# Patient Record
Sex: FEMALE | Race: ASIAN | NOT HISPANIC OR LATINO | Employment: UNEMPLOYED | URBAN - METROPOLITAN AREA
[De-identification: names, ages, dates, MRNs, and addresses within clinical notes are randomized per-mention and may not be internally consistent; named-entity substitution may affect disease eponyms.]

---

## 2022-01-01 ENCOUNTER — APPOINTMENT (OUTPATIENT)
Dept: RADIOLOGY | Facility: MEDICAL CENTER | Age: 57
DRG: 064 | End: 2022-01-01
Attending: HOSPITALIST
Payer: COMMERCIAL

## 2022-01-01 ENCOUNTER — HOME CARE VISIT (OUTPATIENT)
Dept: HOSPICE | Facility: HOSPICE | Age: 57
End: 2022-01-01
Payer: MEDICAID

## 2022-01-01 ENCOUNTER — APPOINTMENT (OUTPATIENT)
Dept: RADIOLOGY | Facility: MEDICAL CENTER | Age: 57
DRG: 064 | End: 2022-01-01
Attending: STUDENT IN AN ORGANIZED HEALTH CARE EDUCATION/TRAINING PROGRAM
Payer: COMMERCIAL

## 2022-01-01 ENCOUNTER — APPOINTMENT (OUTPATIENT)
Dept: RADIOLOGY | Facility: MEDICAL CENTER | Age: 57
DRG: 064 | End: 2022-01-01
Attending: EMERGENCY MEDICINE
Payer: COMMERCIAL

## 2022-01-01 ENCOUNTER — PHARMACY VISIT (OUTPATIENT)
Dept: PHARMACY | Facility: MEDICAL CENTER | Age: 57
End: 2022-01-01
Payer: MEDICARE

## 2022-01-01 ENCOUNTER — ANESTHESIA (OUTPATIENT)
Dept: SURGERY | Facility: MEDICAL CENTER | Age: 57
DRG: 064 | End: 2022-01-01
Payer: COMMERCIAL

## 2022-01-01 ENCOUNTER — APPOINTMENT (OUTPATIENT)
Dept: CARDIOLOGY | Facility: MEDICAL CENTER | Age: 57
DRG: 064 | End: 2022-01-01
Attending: STUDENT IN AN ORGANIZED HEALTH CARE EDUCATION/TRAINING PROGRAM
Payer: COMMERCIAL

## 2022-01-01 ENCOUNTER — HOSPICE ADMISSION (OUTPATIENT)
Dept: HOSPICE | Facility: HOSPICE | Age: 57
End: 2022-01-01
Payer: MEDICAID

## 2022-01-01 ENCOUNTER — ANESTHESIA EVENT (OUTPATIENT)
Dept: SURGERY | Facility: MEDICAL CENTER | Age: 57
DRG: 064 | End: 2022-01-01
Payer: COMMERCIAL

## 2022-01-01 ENCOUNTER — HOSPITAL ENCOUNTER (INPATIENT)
Facility: MEDICAL CENTER | Age: 57
LOS: 37 days | DRG: 064 | End: 2022-12-09
Attending: EMERGENCY MEDICINE | Admitting: HOSPITALIST
Payer: COMMERCIAL

## 2022-01-01 VITALS — RESPIRATION RATE: 14 BRPM | HEART RATE: 92 BPM

## 2022-01-01 VITALS — HEART RATE: 110 BPM | DIASTOLIC BLOOD PRESSURE: 60 MMHG | RESPIRATION RATE: 20 BRPM | SYSTOLIC BLOOD PRESSURE: 106 MMHG

## 2022-01-01 VITALS — RESPIRATION RATE: 14 BRPM

## 2022-01-01 VITALS — DIASTOLIC BLOOD PRESSURE: 90 MMHG | SYSTOLIC BLOOD PRESSURE: 146 MMHG | HEART RATE: 108 BPM | RESPIRATION RATE: 16 BRPM

## 2022-01-01 VITALS
HEART RATE: 96 BPM | RESPIRATION RATE: 16 BRPM | HEIGHT: 58 IN | OXYGEN SATURATION: 94 % | WEIGHT: 122.58 LBS | SYSTOLIC BLOOD PRESSURE: 124 MMHG | BODY MASS INDEX: 25.73 KG/M2 | DIASTOLIC BLOOD PRESSURE: 78 MMHG | TEMPERATURE: 97.6 F

## 2022-01-01 VITALS — RESPIRATION RATE: 18 BRPM

## 2022-01-01 DIAGNOSIS — F33.1 MODERATE EPISODE OF RECURRENT MAJOR DEPRESSIVE DISORDER (HCC): ICD-10-CM

## 2022-01-01 DIAGNOSIS — R29.898 WEAKNESS OF RIGHT UPPER EXTREMITY: ICD-10-CM

## 2022-01-01 DIAGNOSIS — R06.00 DYSPNEA, UNSPECIFIED TYPE: ICD-10-CM

## 2022-01-01 DIAGNOSIS — R52 PAIN ASSOCIATED WITH TERMINAL ILLNESS: ICD-10-CM

## 2022-01-01 DIAGNOSIS — I63.9 CEREBROVASCULAR ACCIDENT (CVA), UNSPECIFIED MECHANISM (HCC): ICD-10-CM

## 2022-01-01 DIAGNOSIS — J96.01 ACUTE HYPOXEMIC RESPIRATORY FAILURE (HCC): ICD-10-CM

## 2022-01-01 DIAGNOSIS — I63.232 ACUTE ISCHEMIC LEFT ICA STROKE (HCC): ICD-10-CM

## 2022-01-01 DIAGNOSIS — Z51.5 COMFORT MEASURES ONLY STATUS: ICD-10-CM

## 2022-01-01 DIAGNOSIS — R62.7 FAILURE TO THRIVE IN ADULT: ICD-10-CM

## 2022-01-01 DIAGNOSIS — F41.9 ANXIETY: ICD-10-CM

## 2022-01-01 DIAGNOSIS — J96.01 ACUTE RESPIRATORY FAILURE WITH HYPOXEMIA (HCC): ICD-10-CM

## 2022-01-01 DIAGNOSIS — I63.9 CEREBROVASCULAR ACCIDENT (CVA), UNSPECIFIED MECHANISM (HCC): Primary | ICD-10-CM

## 2022-01-01 DIAGNOSIS — I63.9 ACUTE CVA (CEREBROVASCULAR ACCIDENT) (HCC): ICD-10-CM

## 2022-01-01 DIAGNOSIS — R13.12 OROPHARYNGEAL DYSPHAGIA: ICD-10-CM

## 2022-01-01 LAB
ABO GROUP BLD: NORMAL
ALBUMIN SERPL BCP-MCNC: 2.7 G/DL (ref 3.2–4.9)
ALBUMIN SERPL BCP-MCNC: 2.8 G/DL (ref 3.2–4.9)
ALBUMIN SERPL BCP-MCNC: 2.9 G/DL (ref 3.2–4.9)
ALBUMIN SERPL BCP-MCNC: 3.2 G/DL (ref 3.2–4.9)
ALBUMIN SERPL BCP-MCNC: 3.4 G/DL (ref 3.2–4.9)
ALBUMIN SERPL BCP-MCNC: 4 G/DL (ref 3.2–4.9)
ALBUMIN/GLOB SERPL: 0.8 G/DL
ALBUMIN/GLOB SERPL: 0.8 G/DL
ALBUMIN/GLOB SERPL: 0.9 G/DL
ALBUMIN/GLOB SERPL: 1 G/DL
ALBUMIN/GLOB SERPL: 1 G/DL
ALBUMIN/GLOB SERPL: 1.4 G/DL
ALP SERPL-CCNC: 108 U/L (ref 30–99)
ALP SERPL-CCNC: 109 U/L (ref 30–99)
ALP SERPL-CCNC: 120 U/L (ref 30–99)
ALP SERPL-CCNC: 121 U/L (ref 30–99)
ALP SERPL-CCNC: 82 U/L (ref 30–99)
ALP SERPL-CCNC: 88 U/L (ref 30–99)
ALT SERPL-CCNC: 18 U/L (ref 2–50)
ALT SERPL-CCNC: 20 U/L (ref 2–50)
ALT SERPL-CCNC: 34 U/L (ref 2–50)
ALT SERPL-CCNC: 35 U/L (ref 2–50)
ALT SERPL-CCNC: 41 U/L (ref 2–50)
ALT SERPL-CCNC: 41 U/L (ref 2–50)
ANION GAP SERPL CALC-SCNC: 10 MMOL/L (ref 7–16)
ANION GAP SERPL CALC-SCNC: 13 MMOL/L (ref 7–16)
ANION GAP SERPL CALC-SCNC: 14 MMOL/L (ref 7–16)
ANION GAP SERPL CALC-SCNC: 15 MMOL/L (ref 7–16)
ANION GAP SERPL CALC-SCNC: 6 MMOL/L (ref 7–16)
ANION GAP SERPL CALC-SCNC: 7 MMOL/L (ref 7–16)
ANION GAP SERPL CALC-SCNC: 8 MMOL/L (ref 7–16)
ANION GAP SERPL CALC-SCNC: 9 MMOL/L (ref 7–16)
APPEARANCE UR: ABNORMAL
APTT PPP: 23.4 SEC (ref 24.7–36)
AST SERPL-CCNC: 24 U/L (ref 12–45)
AST SERPL-CCNC: 24 U/L (ref 12–45)
AST SERPL-CCNC: 32 U/L (ref 12–45)
AST SERPL-CCNC: 35 U/L (ref 12–45)
AST SERPL-CCNC: 38 U/L (ref 12–45)
AST SERPL-CCNC: 38 U/L (ref 12–45)
BACTERIA #/AREA URNS HPF: ABNORMAL /HPF
BACTERIA UR CULT: ABNORMAL
BACTERIA UR CULT: ABNORMAL
BASOPHILS # BLD AUTO: 0.6 % (ref 0–1.8)
BASOPHILS # BLD AUTO: 0.7 % (ref 0–1.8)
BASOPHILS # BLD AUTO: 0.8 % (ref 0–1.8)
BASOPHILS # BLD AUTO: 1 % (ref 0–1.8)
BASOPHILS # BLD: 0.07 K/UL (ref 0–0.12)
BASOPHILS # BLD: 0.09 K/UL (ref 0–0.12)
BASOPHILS # BLD: 0.11 K/UL (ref 0–0.12)
BILIRUB SERPL-MCNC: 0.2 MG/DL (ref 0.1–1.5)
BILIRUB SERPL-MCNC: 0.2 MG/DL (ref 0.1–1.5)
BILIRUB SERPL-MCNC: 0.3 MG/DL (ref 0.1–1.5)
BILIRUB SERPL-MCNC: 0.3 MG/DL (ref 0.1–1.5)
BILIRUB SERPL-MCNC: 0.5 MG/DL (ref 0.1–1.5)
BILIRUB SERPL-MCNC: 0.5 MG/DL (ref 0.1–1.5)
BILIRUB UR QL STRIP.AUTO: NEGATIVE
BLD GP AB SCN SERPL QL: NORMAL
BUN SERPL-MCNC: 15 MG/DL (ref 8–22)
BUN SERPL-MCNC: 16 MG/DL (ref 8–22)
BUN SERPL-MCNC: 17 MG/DL (ref 8–22)
BUN SERPL-MCNC: 23 MG/DL (ref 8–22)
BUN SERPL-MCNC: 33 MG/DL (ref 8–22)
BUN SERPL-MCNC: 36 MG/DL (ref 8–22)
BUN SERPL-MCNC: 40 MG/DL (ref 8–22)
BUN SERPL-MCNC: 40 MG/DL (ref 8–22)
BUN SERPL-MCNC: 44 MG/DL (ref 8–22)
BUN SERPL-MCNC: 47 MG/DL (ref 8–22)
CALCIUM SERPL-MCNC: 10.1 MG/DL (ref 8.5–10.5)
CALCIUM SERPL-MCNC: 10.2 MG/DL (ref 8.5–10.5)
CALCIUM SERPL-MCNC: 9.1 MG/DL (ref 8.5–10.5)
CALCIUM SERPL-MCNC: 9.1 MG/DL (ref 8.5–10.5)
CALCIUM SERPL-MCNC: 9.2 MG/DL (ref 8.5–10.5)
CALCIUM SERPL-MCNC: 9.2 MG/DL (ref 8.5–10.5)
CALCIUM SERPL-MCNC: 9.3 MG/DL (ref 8.5–10.5)
CALCIUM SERPL-MCNC: 9.4 MG/DL (ref 8.5–10.5)
CALCIUM SERPL-MCNC: 9.4 MG/DL (ref 8.5–10.5)
CALCIUM SERPL-MCNC: 9.6 MG/DL (ref 8.5–10.5)
CHLORIDE SERPL-SCNC: 100 MMOL/L (ref 96–112)
CHLORIDE SERPL-SCNC: 102 MMOL/L (ref 96–112)
CHLORIDE SERPL-SCNC: 103 MMOL/L (ref 96–112)
CHLORIDE SERPL-SCNC: 105 MMOL/L (ref 96–112)
CHLORIDE SERPL-SCNC: 95 MMOL/L (ref 96–112)
CHLORIDE SERPL-SCNC: 97 MMOL/L (ref 96–112)
CHLORIDE SERPL-SCNC: 97 MMOL/L (ref 96–112)
CHLORIDE SERPL-SCNC: 99 MMOL/L (ref 96–112)
CHOLEST SERPL-MCNC: 243 MG/DL (ref 100–199)
CO2 SERPL-SCNC: 22 MMOL/L (ref 20–33)
CO2 SERPL-SCNC: 22 MMOL/L (ref 20–33)
CO2 SERPL-SCNC: 23 MMOL/L (ref 20–33)
CO2 SERPL-SCNC: 25 MMOL/L (ref 20–33)
CO2 SERPL-SCNC: 28 MMOL/L (ref 20–33)
CO2 SERPL-SCNC: 31 MMOL/L (ref 20–33)
CO2 SERPL-SCNC: 35 MMOL/L (ref 20–33)
CO2 SERPL-SCNC: 35 MMOL/L (ref 20–33)
COLOR UR: YELLOW
CREAT SERPL-MCNC: 0.3 MG/DL (ref 0.5–1.4)
CREAT SERPL-MCNC: 0.31 MG/DL (ref 0.5–1.4)
CREAT SERPL-MCNC: 0.35 MG/DL (ref 0.5–1.4)
CREAT SERPL-MCNC: 0.49 MG/DL (ref 0.5–1.4)
CREAT SERPL-MCNC: 0.52 MG/DL (ref 0.5–1.4)
CREAT SERPL-MCNC: 0.53 MG/DL (ref 0.5–1.4)
CREAT SERPL-MCNC: 0.53 MG/DL (ref 0.5–1.4)
CREAT SERPL-MCNC: 0.81 MG/DL (ref 0.5–1.4)
CREAT SERPL-MCNC: 0.85 MG/DL (ref 0.5–1.4)
CREAT SERPL-MCNC: 0.85 MG/DL (ref 0.5–1.4)
CRP SERPL HS-MCNC: 0.6 MG/DL (ref 0–0.75)
CRP SERPL HS-MCNC: 0.64 MG/DL (ref 0–0.75)
CRP SERPL HS-MCNC: 1.25 MG/DL (ref 0–0.75)
CRP SERPL HS-MCNC: 3.53 MG/DL (ref 0–0.75)
CRP SERPL HS-MCNC: <0.3 MG/DL (ref 0–0.75)
EKG IMPRESSION: NORMAL
EOSINOPHIL # BLD AUTO: 0.02 K/UL (ref 0–0.51)
EOSINOPHIL # BLD AUTO: 0.17 K/UL (ref 0–0.51)
EOSINOPHIL # BLD AUTO: 0.18 K/UL (ref 0–0.51)
EOSINOPHIL # BLD AUTO: 0.22 K/UL (ref 0–0.51)
EOSINOPHIL # BLD AUTO: 0.22 K/UL (ref 0–0.51)
EOSINOPHIL # BLD AUTO: 0.23 K/UL (ref 0–0.51)
EOSINOPHIL NFR BLD: 0.2 % (ref 0–6.9)
EOSINOPHIL NFR BLD: 1.4 % (ref 0–6.9)
EOSINOPHIL NFR BLD: 1.5 % (ref 0–6.9)
EOSINOPHIL NFR BLD: 1.5 % (ref 0–6.9)
EOSINOPHIL NFR BLD: 1.8 % (ref 0–6.9)
EOSINOPHIL NFR BLD: 1.8 % (ref 0–6.9)
EPI CELLS #/AREA URNS HPF: ABNORMAL /HPF
ERYTHROCYTE [DISTWIDTH] IN BLOOD BY AUTOMATED COUNT: 37 FL (ref 35.9–50)
ERYTHROCYTE [DISTWIDTH] IN BLOOD BY AUTOMATED COUNT: 37.1 FL (ref 35.9–50)
ERYTHROCYTE [DISTWIDTH] IN BLOOD BY AUTOMATED COUNT: 37.1 FL (ref 35.9–50)
ERYTHROCYTE [DISTWIDTH] IN BLOOD BY AUTOMATED COUNT: 37.5 FL (ref 35.9–50)
ERYTHROCYTE [DISTWIDTH] IN BLOOD BY AUTOMATED COUNT: 38 FL (ref 35.9–50)
ERYTHROCYTE [DISTWIDTH] IN BLOOD BY AUTOMATED COUNT: 39.5 FL (ref 35.9–50)
ERYTHROCYTE [DISTWIDTH] IN BLOOD BY AUTOMATED COUNT: 40 FL (ref 35.9–50)
ERYTHROCYTE [DISTWIDTH] IN BLOOD BY AUTOMATED COUNT: 40.6 FL (ref 35.9–50)
ERYTHROCYTE [DISTWIDTH] IN BLOOD BY AUTOMATED COUNT: 41.1 FL (ref 35.9–50)
ERYTHROCYTE [DISTWIDTH] IN BLOOD BY AUTOMATED COUNT: 41.3 FL (ref 35.9–50)
EST. AVERAGE GLUCOSE BLD GHB EST-MCNC: 280 MG/DL
GAMMA INTERFERON BACKGROUND BLD IA-ACNC: 0.02 IU/ML
GFR SERPLBLD CREATININE-BSD FMLA CKD-EPI: 106 ML/MIN/1.73 M 2
GFR SERPLBLD CREATININE-BSD FMLA CKD-EPI: 107 ML/MIN/1.73 M 2
GFR SERPLBLD CREATININE-BSD FMLA CKD-EPI: 107 ML/MIN/1.73 M 2
GFR SERPLBLD CREATININE-BSD FMLA CKD-EPI: 109 ML/MIN/1.73 M 2
GFR SERPLBLD CREATININE-BSD FMLA CKD-EPI: 118 ML/MIN/1.73 M 2
GFR SERPLBLD CREATININE-BSD FMLA CKD-EPI: 121 ML/MIN/1.73 M 2
GFR SERPLBLD CREATININE-BSD FMLA CKD-EPI: 122 ML/MIN/1.73 M 2
GFR SERPLBLD CREATININE-BSD FMLA CKD-EPI: 79 ML/MIN/1.73 M 2
GFR SERPLBLD CREATININE-BSD FMLA CKD-EPI: 79 ML/MIN/1.73 M 2
GFR SERPLBLD CREATININE-BSD FMLA CKD-EPI: 84 ML/MIN/1.73 M 2
GLOBULIN SER CALC-MCNC: 2.8 G/DL (ref 1.9–3.5)
GLOBULIN SER CALC-MCNC: 3.2 G/DL (ref 1.9–3.5)
GLOBULIN SER CALC-MCNC: 3.2 G/DL (ref 1.9–3.5)
GLOBULIN SER CALC-MCNC: 3.3 G/DL (ref 1.9–3.5)
GLOBULIN SER CALC-MCNC: 3.3 G/DL (ref 1.9–3.5)
GLOBULIN SER CALC-MCNC: 3.5 G/DL (ref 1.9–3.5)
GLUCOSE BLD STRIP.AUTO-MCNC: 102 MG/DL (ref 65–99)
GLUCOSE BLD STRIP.AUTO-MCNC: 105 MG/DL (ref 65–99)
GLUCOSE BLD STRIP.AUTO-MCNC: 107 MG/DL (ref 65–99)
GLUCOSE BLD STRIP.AUTO-MCNC: 110 MG/DL (ref 65–99)
GLUCOSE BLD STRIP.AUTO-MCNC: 111 MG/DL (ref 65–99)
GLUCOSE BLD STRIP.AUTO-MCNC: 112 MG/DL (ref 65–99)
GLUCOSE BLD STRIP.AUTO-MCNC: 114 MG/DL (ref 65–99)
GLUCOSE BLD STRIP.AUTO-MCNC: 114 MG/DL (ref 65–99)
GLUCOSE BLD STRIP.AUTO-MCNC: 117 MG/DL (ref 65–99)
GLUCOSE BLD STRIP.AUTO-MCNC: 121 MG/DL (ref 65–99)
GLUCOSE BLD STRIP.AUTO-MCNC: 125 MG/DL (ref 65–99)
GLUCOSE BLD STRIP.AUTO-MCNC: 127 MG/DL (ref 65–99)
GLUCOSE BLD STRIP.AUTO-MCNC: 129 MG/DL (ref 65–99)
GLUCOSE BLD STRIP.AUTO-MCNC: 131 MG/DL (ref 65–99)
GLUCOSE BLD STRIP.AUTO-MCNC: 131 MG/DL (ref 65–99)
GLUCOSE BLD STRIP.AUTO-MCNC: 132 MG/DL (ref 65–99)
GLUCOSE BLD STRIP.AUTO-MCNC: 133 MG/DL (ref 65–99)
GLUCOSE BLD STRIP.AUTO-MCNC: 134 MG/DL (ref 65–99)
GLUCOSE BLD STRIP.AUTO-MCNC: 135 MG/DL (ref 65–99)
GLUCOSE BLD STRIP.AUTO-MCNC: 137 MG/DL (ref 65–99)
GLUCOSE BLD STRIP.AUTO-MCNC: 139 MG/DL (ref 65–99)
GLUCOSE BLD STRIP.AUTO-MCNC: 140 MG/DL (ref 65–99)
GLUCOSE BLD STRIP.AUTO-MCNC: 141 MG/DL (ref 65–99)
GLUCOSE BLD STRIP.AUTO-MCNC: 142 MG/DL (ref 65–99)
GLUCOSE BLD STRIP.AUTO-MCNC: 143 MG/DL (ref 65–99)
GLUCOSE BLD STRIP.AUTO-MCNC: 144 MG/DL (ref 65–99)
GLUCOSE BLD STRIP.AUTO-MCNC: 144 MG/DL (ref 65–99)
GLUCOSE BLD STRIP.AUTO-MCNC: 147 MG/DL (ref 65–99)
GLUCOSE BLD STRIP.AUTO-MCNC: 148 MG/DL (ref 65–99)
GLUCOSE BLD STRIP.AUTO-MCNC: 150 MG/DL (ref 65–99)
GLUCOSE BLD STRIP.AUTO-MCNC: 151 MG/DL (ref 65–99)
GLUCOSE BLD STRIP.AUTO-MCNC: 152 MG/DL (ref 65–99)
GLUCOSE BLD STRIP.AUTO-MCNC: 153 MG/DL (ref 65–99)
GLUCOSE BLD STRIP.AUTO-MCNC: 154 MG/DL (ref 65–99)
GLUCOSE BLD STRIP.AUTO-MCNC: 156 MG/DL (ref 65–99)
GLUCOSE BLD STRIP.AUTO-MCNC: 158 MG/DL (ref 65–99)
GLUCOSE BLD STRIP.AUTO-MCNC: 159 MG/DL (ref 65–99)
GLUCOSE BLD STRIP.AUTO-MCNC: 160 MG/DL (ref 65–99)
GLUCOSE BLD STRIP.AUTO-MCNC: 164 MG/DL (ref 65–99)
GLUCOSE BLD STRIP.AUTO-MCNC: 167 MG/DL (ref 65–99)
GLUCOSE BLD STRIP.AUTO-MCNC: 169 MG/DL (ref 65–99)
GLUCOSE BLD STRIP.AUTO-MCNC: 170 MG/DL (ref 65–99)
GLUCOSE BLD STRIP.AUTO-MCNC: 172 MG/DL (ref 65–99)
GLUCOSE BLD STRIP.AUTO-MCNC: 173 MG/DL (ref 65–99)
GLUCOSE BLD STRIP.AUTO-MCNC: 173 MG/DL (ref 65–99)
GLUCOSE BLD STRIP.AUTO-MCNC: 174 MG/DL (ref 65–99)
GLUCOSE BLD STRIP.AUTO-MCNC: 175 MG/DL (ref 65–99)
GLUCOSE BLD STRIP.AUTO-MCNC: 175 MG/DL (ref 65–99)
GLUCOSE BLD STRIP.AUTO-MCNC: 176 MG/DL (ref 65–99)
GLUCOSE BLD STRIP.AUTO-MCNC: 177 MG/DL (ref 65–99)
GLUCOSE BLD STRIP.AUTO-MCNC: 178 MG/DL (ref 65–99)
GLUCOSE BLD STRIP.AUTO-MCNC: 179 MG/DL (ref 65–99)
GLUCOSE BLD STRIP.AUTO-MCNC: 180 MG/DL (ref 65–99)
GLUCOSE BLD STRIP.AUTO-MCNC: 183 MG/DL (ref 65–99)
GLUCOSE BLD STRIP.AUTO-MCNC: 185 MG/DL (ref 65–99)
GLUCOSE BLD STRIP.AUTO-MCNC: 186 MG/DL (ref 65–99)
GLUCOSE BLD STRIP.AUTO-MCNC: 187 MG/DL (ref 65–99)
GLUCOSE BLD STRIP.AUTO-MCNC: 188 MG/DL (ref 65–99)
GLUCOSE BLD STRIP.AUTO-MCNC: 191 MG/DL (ref 65–99)
GLUCOSE BLD STRIP.AUTO-MCNC: 193 MG/DL (ref 65–99)
GLUCOSE BLD STRIP.AUTO-MCNC: 199 MG/DL (ref 65–99)
GLUCOSE BLD STRIP.AUTO-MCNC: 200 MG/DL (ref 65–99)
GLUCOSE BLD STRIP.AUTO-MCNC: 201 MG/DL (ref 65–99)
GLUCOSE BLD STRIP.AUTO-MCNC: 202 MG/DL (ref 65–99)
GLUCOSE BLD STRIP.AUTO-MCNC: 203 MG/DL (ref 65–99)
GLUCOSE BLD STRIP.AUTO-MCNC: 203 MG/DL (ref 65–99)
GLUCOSE BLD STRIP.AUTO-MCNC: 204 MG/DL (ref 65–99)
GLUCOSE BLD STRIP.AUTO-MCNC: 206 MG/DL (ref 65–99)
GLUCOSE BLD STRIP.AUTO-MCNC: 207 MG/DL (ref 65–99)
GLUCOSE BLD STRIP.AUTO-MCNC: 208 MG/DL (ref 65–99)
GLUCOSE BLD STRIP.AUTO-MCNC: 211 MG/DL (ref 65–99)
GLUCOSE BLD STRIP.AUTO-MCNC: 212 MG/DL (ref 65–99)
GLUCOSE BLD STRIP.AUTO-MCNC: 212 MG/DL (ref 65–99)
GLUCOSE BLD STRIP.AUTO-MCNC: 213 MG/DL (ref 65–99)
GLUCOSE BLD STRIP.AUTO-MCNC: 213 MG/DL (ref 65–99)
GLUCOSE BLD STRIP.AUTO-MCNC: 215 MG/DL (ref 65–99)
GLUCOSE BLD STRIP.AUTO-MCNC: 218 MG/DL (ref 65–99)
GLUCOSE BLD STRIP.AUTO-MCNC: 220 MG/DL (ref 65–99)
GLUCOSE BLD STRIP.AUTO-MCNC: 221 MG/DL (ref 65–99)
GLUCOSE BLD STRIP.AUTO-MCNC: 222 MG/DL (ref 65–99)
GLUCOSE BLD STRIP.AUTO-MCNC: 224 MG/DL (ref 65–99)
GLUCOSE BLD STRIP.AUTO-MCNC: 224 MG/DL (ref 65–99)
GLUCOSE BLD STRIP.AUTO-MCNC: 225 MG/DL (ref 65–99)
GLUCOSE BLD STRIP.AUTO-MCNC: 226 MG/DL (ref 65–99)
GLUCOSE BLD STRIP.AUTO-MCNC: 227 MG/DL (ref 65–99)
GLUCOSE BLD STRIP.AUTO-MCNC: 228 MG/DL (ref 65–99)
GLUCOSE BLD STRIP.AUTO-MCNC: 230 MG/DL (ref 65–99)
GLUCOSE BLD STRIP.AUTO-MCNC: 232 MG/DL (ref 65–99)
GLUCOSE BLD STRIP.AUTO-MCNC: 233 MG/DL (ref 65–99)
GLUCOSE BLD STRIP.AUTO-MCNC: 234 MG/DL (ref 65–99)
GLUCOSE BLD STRIP.AUTO-MCNC: 237 MG/DL (ref 65–99)
GLUCOSE BLD STRIP.AUTO-MCNC: 242 MG/DL (ref 65–99)
GLUCOSE BLD STRIP.AUTO-MCNC: 242 MG/DL (ref 65–99)
GLUCOSE BLD STRIP.AUTO-MCNC: 244 MG/DL (ref 65–99)
GLUCOSE BLD STRIP.AUTO-MCNC: 245 MG/DL (ref 65–99)
GLUCOSE BLD STRIP.AUTO-MCNC: 251 MG/DL (ref 65–99)
GLUCOSE BLD STRIP.AUTO-MCNC: 255 MG/DL (ref 65–99)
GLUCOSE BLD STRIP.AUTO-MCNC: 257 MG/DL (ref 65–99)
GLUCOSE BLD STRIP.AUTO-MCNC: 266 MG/DL (ref 65–99)
GLUCOSE BLD STRIP.AUTO-MCNC: 266 MG/DL (ref 65–99)
GLUCOSE BLD STRIP.AUTO-MCNC: 268 MG/DL (ref 65–99)
GLUCOSE BLD STRIP.AUTO-MCNC: 273 MG/DL (ref 65–99)
GLUCOSE BLD STRIP.AUTO-MCNC: 274 MG/DL (ref 65–99)
GLUCOSE BLD STRIP.AUTO-MCNC: 277 MG/DL (ref 65–99)
GLUCOSE BLD STRIP.AUTO-MCNC: 280 MG/DL (ref 65–99)
GLUCOSE BLD STRIP.AUTO-MCNC: 282 MG/DL (ref 65–99)
GLUCOSE BLD STRIP.AUTO-MCNC: 287 MG/DL (ref 65–99)
GLUCOSE BLD STRIP.AUTO-MCNC: 65 MG/DL (ref 65–99)
GLUCOSE BLD STRIP.AUTO-MCNC: 82 MG/DL (ref 65–99)
GLUCOSE BLD STRIP.AUTO-MCNC: 84 MG/DL (ref 65–99)
GLUCOSE BLD STRIP.AUTO-MCNC: 92 MG/DL (ref 65–99)
GLUCOSE BLD STRIP.AUTO-MCNC: 95 MG/DL (ref 65–99)
GLUCOSE SERPL-MCNC: 135 MG/DL (ref 65–99)
GLUCOSE SERPL-MCNC: 142 MG/DL (ref 65–99)
GLUCOSE SERPL-MCNC: 147 MG/DL (ref 65–99)
GLUCOSE SERPL-MCNC: 181 MG/DL (ref 65–99)
GLUCOSE SERPL-MCNC: 191 MG/DL (ref 65–99)
GLUCOSE SERPL-MCNC: 193 MG/DL (ref 65–99)
GLUCOSE SERPL-MCNC: 204 MG/DL (ref 65–99)
GLUCOSE SERPL-MCNC: 215 MG/DL (ref 65–99)
GLUCOSE SERPL-MCNC: 221 MG/DL (ref 65–99)
GLUCOSE SERPL-MCNC: 224 MG/DL (ref 65–99)
GLUCOSE UR STRIP.AUTO-MCNC: 100 MG/DL
HBA1C MFR BLD: 11.4 % (ref 4–5.6)
HCT VFR BLD AUTO: 36.4 % (ref 37–47)
HCT VFR BLD AUTO: 38 % (ref 37–47)
HCT VFR BLD AUTO: 38.4 % (ref 37–47)
HCT VFR BLD AUTO: 38.6 % (ref 37–47)
HCT VFR BLD AUTO: 38.6 % (ref 37–47)
HCT VFR BLD AUTO: 39.1 % (ref 37–47)
HCT VFR BLD AUTO: 39.4 % (ref 37–47)
HCT VFR BLD AUTO: 39.9 % (ref 37–47)
HCT VFR BLD AUTO: 40.6 % (ref 37–47)
HCT VFR BLD AUTO: 42.8 % (ref 37–47)
HDLC SERPL-MCNC: 56 MG/DL
HGB BLD-MCNC: 11.9 G/DL (ref 12–16)
HGB BLD-MCNC: 12.2 G/DL (ref 12–16)
HGB BLD-MCNC: 12.6 G/DL (ref 12–16)
HGB BLD-MCNC: 12.9 G/DL (ref 12–16)
HGB BLD-MCNC: 12.9 G/DL (ref 12–16)
HGB BLD-MCNC: 13 G/DL (ref 12–16)
HGB BLD-MCNC: 13.4 G/DL (ref 12–16)
HYALINE CASTS #/AREA URNS LPF: ABNORMAL /LPF
IMM GRANULOCYTES # BLD AUTO: 0.01 K/UL (ref 0–0.11)
IMM GRANULOCYTES # BLD AUTO: 0.04 K/UL (ref 0–0.11)
IMM GRANULOCYTES # BLD AUTO: 0.06 K/UL (ref 0–0.11)
IMM GRANULOCYTES # BLD AUTO: 0.07 K/UL (ref 0–0.11)
IMM GRANULOCYTES # BLD AUTO: 0.08 K/UL (ref 0–0.11)
IMM GRANULOCYTES # BLD AUTO: 0.1 K/UL (ref 0–0.11)
IMM GRANULOCYTES NFR BLD AUTO: 0.1 % (ref 0–0.9)
IMM GRANULOCYTES NFR BLD AUTO: 0.4 % (ref 0–0.9)
IMM GRANULOCYTES NFR BLD AUTO: 0.5 % (ref 0–0.9)
IMM GRANULOCYTES NFR BLD AUTO: 0.6 % (ref 0–0.9)
IMM GRANULOCYTES NFR BLD AUTO: 0.7 % (ref 0–0.9)
IMM GRANULOCYTES NFR BLD AUTO: 0.7 % (ref 0–0.9)
INR PPP: 1.04 (ref 0.87–1.13)
KETONES UR STRIP.AUTO-MCNC: NEGATIVE MG/DL
LACTATE SERPL-SCNC: 0.9 MMOL/L (ref 0.5–2)
LDLC SERPL CALC-MCNC: 162 MG/DL
LEUKOCYTE ESTERASE UR QL STRIP.AUTO: ABNORMAL
LV EJECT FRACT  99904: 60
LV EJECT FRACT MOD 2C 99903: 42.92
LV EJECT FRACT MOD 4C 99902: 46.84
LV EJECT FRACT MOD BP 99901: 46.06
LYMPHOCYTES # BLD AUTO: 1.4 K/UL (ref 1–4.8)
LYMPHOCYTES # BLD AUTO: 1.6 K/UL (ref 1–4.8)
LYMPHOCYTES # BLD AUTO: 1.65 K/UL (ref 1–4.8)
LYMPHOCYTES # BLD AUTO: 1.73 K/UL (ref 1–4.8)
LYMPHOCYTES # BLD AUTO: 1.74 K/UL (ref 1–4.8)
LYMPHOCYTES # BLD AUTO: 1.95 K/UL (ref 1–4.8)
LYMPHOCYTES NFR BLD: 11 % (ref 22–41)
LYMPHOCYTES NFR BLD: 12.6 % (ref 22–41)
LYMPHOCYTES NFR BLD: 13.1 % (ref 22–41)
LYMPHOCYTES NFR BLD: 13.6 % (ref 22–41)
LYMPHOCYTES NFR BLD: 15.2 % (ref 22–41)
LYMPHOCYTES NFR BLD: 19.7 % (ref 22–41)
M TB IFN-G BLD-IMP: NEGATIVE
M TB IFN-G CD4+ BCKGRND COR BLD-ACNC: 0 IU/ML
MAGNESIUM SERPL-MCNC: 1.7 MG/DL (ref 1.5–2.5)
MAGNESIUM SERPL-MCNC: 2 MG/DL (ref 1.5–2.5)
MAGNESIUM SERPL-MCNC: 2 MG/DL (ref 1.5–2.5)
MAGNESIUM SERPL-MCNC: 2.2 MG/DL (ref 1.5–2.5)
MCH RBC QN AUTO: 28.8 PG (ref 27–33)
MCH RBC QN AUTO: 28.9 PG (ref 27–33)
MCH RBC QN AUTO: 29 PG (ref 27–33)
MCH RBC QN AUTO: 29 PG (ref 27–33)
MCH RBC QN AUTO: 29.1 PG (ref 27–33)
MCH RBC QN AUTO: 29.1 PG (ref 27–33)
MCH RBC QN AUTO: 29.5 PG (ref 27–33)
MCHC RBC AUTO-ENTMCNC: 31.3 G/DL (ref 33.6–35)
MCHC RBC AUTO-ENTMCNC: 31.8 G/DL (ref 33.6–35)
MCHC RBC AUTO-ENTMCNC: 32 G/DL (ref 33.6–35)
MCHC RBC AUTO-ENTMCNC: 32 G/DL (ref 33.6–35)
MCHC RBC AUTO-ENTMCNC: 32.3 G/DL (ref 33.6–35)
MCHC RBC AUTO-ENTMCNC: 32.6 G/DL (ref 33.6–35)
MCHC RBC AUTO-ENTMCNC: 32.6 G/DL (ref 33.6–35)
MCHC RBC AUTO-ENTMCNC: 32.7 G/DL (ref 33.6–35)
MCHC RBC AUTO-ENTMCNC: 33 G/DL (ref 33.6–35)
MCHC RBC AUTO-ENTMCNC: 33.2 G/DL (ref 33.6–35)
MCV RBC AUTO: 87.6 FL (ref 81.4–97.8)
MCV RBC AUTO: 87.7 FL (ref 81.4–97.8)
MCV RBC AUTO: 88.5 FL (ref 81.4–97.8)
MCV RBC AUTO: 88.6 FL (ref 81.4–97.8)
MCV RBC AUTO: 89.1 FL (ref 81.4–97.8)
MCV RBC AUTO: 90.2 FL (ref 81.4–97.8)
MCV RBC AUTO: 91 FL (ref 81.4–97.8)
MCV RBC AUTO: 91 FL (ref 81.4–97.8)
MCV RBC AUTO: 91.1 FL (ref 81.4–97.8)
MCV RBC AUTO: 91.8 FL (ref 81.4–97.8)
MICRO URNS: ABNORMAL
MITOGEN IGNF BCKGRD COR BLD-ACNC: 2.92 IU/ML
MONOCYTES # BLD AUTO: 0.42 K/UL (ref 0–0.85)
MONOCYTES # BLD AUTO: 0.6 K/UL (ref 0–0.85)
MONOCYTES # BLD AUTO: 0.66 K/UL (ref 0–0.85)
MONOCYTES # BLD AUTO: 0.7 K/UL (ref 0–0.85)
MONOCYTES # BLD AUTO: 0.72 K/UL (ref 0–0.85)
MONOCYTES # BLD AUTO: 0.93 K/UL (ref 0–0.85)
MONOCYTES NFR BLD AUTO: 4.7 % (ref 0–13.4)
MONOCYTES NFR BLD AUTO: 4.8 % (ref 0–13.4)
MONOCYTES NFR BLD AUTO: 5.4 % (ref 0–13.4)
MONOCYTES NFR BLD AUTO: 5.4 % (ref 0–13.4)
MONOCYTES NFR BLD AUTO: 6.3 % (ref 0–13.4)
MONOCYTES NFR BLD AUTO: 6.5 % (ref 0–13.4)
NEUTROPHILS # BLD AUTO: 10.31 K/UL (ref 2–7.15)
NEUTROPHILS # BLD AUTO: 10.38 K/UL (ref 2–7.15)
NEUTROPHILS # BLD AUTO: 10.98 K/UL (ref 2–7.15)
NEUTROPHILS # BLD AUTO: 6.54 K/UL (ref 2–7.15)
NEUTROPHILS # BLD AUTO: 8.63 K/UL (ref 2–7.15)
NEUTROPHILS # BLD AUTO: 9.57 K/UL (ref 2–7.15)
NEUTROPHILS NFR BLD: 74.2 % (ref 44–72)
NEUTROPHILS NFR BLD: 76 % (ref 44–72)
NEUTROPHILS NFR BLD: 76.9 % (ref 44–72)
NEUTROPHILS NFR BLD: 78.3 % (ref 44–72)
NEUTROPHILS NFR BLD: 79.4 % (ref 44–72)
NEUTROPHILS NFR BLD: 81.2 % (ref 44–72)
NITRITE UR QL STRIP.AUTO: NEGATIVE
NRBC # BLD AUTO: 0 K/UL
NRBC BLD-RTO: 0 /100 WBC
NT-PROBNP SERPL IA-MCNC: 675 PG/ML (ref 0–125)
PH UR STRIP.AUTO: 7.5 [PH] (ref 5–8)
PHOSPHATE SERPL-MCNC: 2.9 MG/DL (ref 2.5–4.5)
PHOSPHATE SERPL-MCNC: 3.1 MG/DL (ref 2.5–4.5)
PHOSPHATE SERPL-MCNC: 3.8 MG/DL (ref 2.5–4.5)
PLATELET # BLD AUTO: 324 K/UL (ref 164–446)
PLATELET # BLD AUTO: 326 K/UL (ref 164–446)
PLATELET # BLD AUTO: 331 K/UL (ref 164–446)
PLATELET # BLD AUTO: 340 K/UL (ref 164–446)
PLATELET # BLD AUTO: 367 K/UL (ref 164–446)
PLATELET # BLD AUTO: 387 K/UL (ref 164–446)
PLATELET # BLD AUTO: 388 K/UL (ref 164–446)
PLATELET # BLD AUTO: 393 K/UL (ref 164–446)
PLATELET # BLD AUTO: 413 K/UL (ref 164–446)
PLATELET # BLD AUTO: 461 K/UL (ref 164–446)
PMV BLD AUTO: 8.9 FL (ref 9–12.9)
PMV BLD AUTO: 8.9 FL (ref 9–12.9)
PMV BLD AUTO: 9 FL (ref 9–12.9)
PMV BLD AUTO: 9 FL (ref 9–12.9)
PMV BLD AUTO: 9.2 FL (ref 9–12.9)
PMV BLD AUTO: 9.4 FL (ref 9–12.9)
PMV BLD AUTO: 9.5 FL (ref 9–12.9)
PMV BLD AUTO: 9.8 FL (ref 9–12.9)
POTASSIUM SERPL-SCNC: 3.9 MMOL/L (ref 3.6–5.5)
POTASSIUM SERPL-SCNC: 4 MMOL/L (ref 3.6–5.5)
POTASSIUM SERPL-SCNC: 4.1 MMOL/L (ref 3.6–5.5)
POTASSIUM SERPL-SCNC: 4.3 MMOL/L (ref 3.6–5.5)
POTASSIUM SERPL-SCNC: 4.3 MMOL/L (ref 3.6–5.5)
POTASSIUM SERPL-SCNC: 4.5 MMOL/L (ref 3.6–5.5)
POTASSIUM SERPL-SCNC: 4.8 MMOL/L (ref 3.6–5.5)
POTASSIUM SERPL-SCNC: 4.8 MMOL/L (ref 3.6–5.5)
PREALB SERPL-MCNC: 16.5 MG/DL (ref 18–38)
PREALB SERPL-MCNC: 17.5 MG/DL (ref 18–38)
PREALB SERPL-MCNC: 18.2 MG/DL (ref 18–38)
PREALB SERPL-MCNC: 18.9 MG/DL (ref 18–38)
PREALB SERPL-MCNC: 21.1 MG/DL (ref 18–38)
PROCALCITONIN SERPL-MCNC: 0.1 NG/ML
PROCALCITONIN SERPL-MCNC: 0.1 NG/ML
PROT SERPL-MCNC: 6.1 G/DL (ref 6–8.2)
PROT SERPL-MCNC: 6.1 G/DL (ref 6–8.2)
PROT SERPL-MCNC: 6.2 G/DL (ref 6–8.2)
PROT SERPL-MCNC: 6.4 G/DL (ref 6–8.2)
PROT SERPL-MCNC: 6.7 G/DL (ref 6–8.2)
PROT SERPL-MCNC: 6.8 G/DL (ref 6–8.2)
PROT UR QL STRIP: 100 MG/DL
PROTHROMBIN TIME: 13.5 SEC (ref 12–14.6)
QFT TB2 - NIL TBQ2: 0 IU/ML
RBC # BLD AUTO: 4.11 M/UL (ref 4.2–5.4)
RBC # BLD AUTO: 4.22 M/UL (ref 4.2–5.4)
RBC # BLD AUTO: 4.33 M/UL (ref 4.2–5.4)
RBC # BLD AUTO: 4.33 M/UL (ref 4.2–5.4)
RBC # BLD AUTO: 4.34 M/UL (ref 4.2–5.4)
RBC # BLD AUTO: 4.36 M/UL (ref 4.2–5.4)
RBC # BLD AUTO: 4.38 M/UL (ref 4.2–5.4)
RBC # BLD AUTO: 4.46 M/UL (ref 4.2–5.4)
RBC # BLD AUTO: 4.5 M/UL (ref 4.2–5.4)
RBC # BLD AUTO: 4.66 M/UL (ref 4.2–5.4)
RBC # URNS HPF: ABNORMAL /HPF
RBC UR QL AUTO: ABNORMAL
RH BLD: NORMAL
SARS-COV+SARS-COV-2 AG RESP QL IA.RAPID: NOTDETECTED
SIGNIFICANT IND 70042: ABNORMAL
SITE SITE: ABNORMAL
SODIUM SERPL-SCNC: 134 MMOL/L (ref 135–145)
SODIUM SERPL-SCNC: 134 MMOL/L (ref 135–145)
SODIUM SERPL-SCNC: 135 MMOL/L (ref 135–145)
SODIUM SERPL-SCNC: 135 MMOL/L (ref 135–145)
SODIUM SERPL-SCNC: 137 MMOL/L (ref 135–145)
SODIUM SERPL-SCNC: 138 MMOL/L (ref 135–145)
SODIUM SERPL-SCNC: 139 MMOL/L (ref 135–145)
SODIUM SERPL-SCNC: 142 MMOL/L (ref 135–145)
SODIUM SERPL-SCNC: 142 MMOL/L (ref 135–145)
SODIUM SERPL-SCNC: 145 MMOL/L (ref 135–145)
SOURCE SOURCE: ABNORMAL
SP GR UR STRIP.AUTO: 1.01
SPECIMEN SOURCE: NORMAL
TRIGL SERPL-MCNC: 127 MG/DL (ref 0–149)
TROPONIN T SERPL-MCNC: 14 NG/L (ref 6–19)
TSH SERPL DL<=0.005 MIU/L-ACNC: 0.48 UIU/ML (ref 0.38–5.33)
UROBILINOGEN UR STRIP.AUTO-MCNC: 1 MG/DL
WBC # BLD AUTO: 11.4 K/UL (ref 4.8–10.8)
WBC # BLD AUTO: 12 K/UL (ref 4.8–10.8)
WBC # BLD AUTO: 12 K/UL (ref 4.8–10.8)
WBC # BLD AUTO: 12.2 K/UL (ref 4.8–10.8)
WBC # BLD AUTO: 12.7 K/UL (ref 4.8–10.8)
WBC # BLD AUTO: 13.1 K/UL (ref 4.8–10.8)
WBC # BLD AUTO: 14.3 K/UL (ref 4.8–10.8)
WBC # BLD AUTO: 8.8 K/UL (ref 4.8–10.8)
WBC # BLD AUTO: 9.4 K/UL (ref 4.8–10.8)
WBC # BLD AUTO: 9.9 K/UL (ref 4.8–10.8)
WBC #/AREA URNS HPF: ABNORMAL /HPF

## 2022-01-01 PROCEDURE — A9270 NON-COVERED ITEM OR SERVICE: HCPCS | Performed by: STUDENT IN AN ORGANIZED HEALTH CARE EDUCATION/TRAINING PROGRAM

## 2022-01-01 PROCEDURE — 82962 GLUCOSE BLOOD TEST: CPT | Mod: 91

## 2022-01-01 PROCEDURE — 86140 C-REACTIVE PROTEIN: CPT

## 2022-01-01 PROCEDURE — A9270 NON-COVERED ITEM OR SERVICE: HCPCS | Performed by: HOSPITALIST

## 2022-01-01 PROCEDURE — 99254 IP/OBS CNSLTJ NEW/EST MOD 60: CPT | Performed by: PHYSICAL MEDICINE & REHABILITATION

## 2022-01-01 PROCEDURE — 700111 HCHG RX REV CODE 636 W/ 250 OVERRIDE (IP): Performed by: STUDENT IN AN ORGANIZED HEALTH CARE EDUCATION/TRAINING PROGRAM

## 2022-01-01 PROCEDURE — 83735 ASSAY OF MAGNESIUM: CPT

## 2022-01-01 PROCEDURE — 700102 HCHG RX REV CODE 250 W/ 637 OVERRIDE(OP): Performed by: STUDENT IN AN ORGANIZED HEALTH CARE EDUCATION/TRAINING PROGRAM

## 2022-01-01 PROCEDURE — A9270 NON-COVERED ITEM OR SERVICE: HCPCS

## 2022-01-01 PROCEDURE — 700102 HCHG RX REV CODE 250 W/ 637 OVERRIDE(OP): Performed by: HOSPITALIST

## 2022-01-01 PROCEDURE — 80053 COMPREHEN METABOLIC PANEL: CPT

## 2022-01-01 PROCEDURE — G0299 HHS/HOSPICE OF RN EA 15 MIN: HCPCS

## 2022-01-01 PROCEDURE — 85025 COMPLETE CBC W/AUTO DIFF WBC: CPT

## 2022-01-01 PROCEDURE — 99232 SBSQ HOSP IP/OBS MODERATE 35: CPT | Performed by: HOSPITALIST

## 2022-01-01 PROCEDURE — 770004 HCHG ROOM/CARE - ONCOLOGY PRIVATE *

## 2022-01-01 PROCEDURE — 160208 HCHG ENDO MINUTES - EA ADDL 1 MIN LEVEL 4: Performed by: INTERNAL MEDICINE

## 2022-01-01 PROCEDURE — 770001 HCHG ROOM/CARE - MED/SURG/GYN PRIV*

## 2022-01-01 PROCEDURE — 82962 GLUCOSE BLOOD TEST: CPT

## 2022-01-01 PROCEDURE — 97535 SELF CARE MNGMENT TRAINING: CPT | Mod: CO

## 2022-01-01 PROCEDURE — 99231 SBSQ HOSP IP/OBS SF/LOW 25: CPT | Performed by: STUDENT IN AN ORGANIZED HEALTH CARE EDUCATION/TRAINING PROGRAM

## 2022-01-01 PROCEDURE — 770020 HCHG ROOM/CARE - TELE (206)

## 2022-01-01 PROCEDURE — 36415 COLL VENOUS BLD VENIPUNCTURE: CPT

## 2022-01-01 PROCEDURE — 93005 ELECTROCARDIOGRAM TRACING: CPT | Performed by: EMERGENCY MEDICINE

## 2022-01-01 PROCEDURE — 84134 ASSAY OF PREALBUMIN: CPT

## 2022-01-01 PROCEDURE — 99232 SBSQ HOSP IP/OBS MODERATE 35: CPT | Performed by: STUDENT IN AN ORGANIZED HEALTH CARE EDUCATION/TRAINING PROGRAM

## 2022-01-01 PROCEDURE — 99222 1ST HOSP IP/OBS MODERATE 55: CPT | Performed by: INTERNAL MEDICINE

## 2022-01-01 PROCEDURE — 83605 ASSAY OF LACTIC ACID: CPT

## 2022-01-01 PROCEDURE — 51798 US URINE CAPACITY MEASURE: CPT

## 2022-01-01 PROCEDURE — S9126 HOSPICE CARE, IN THE HOME, P: HCPCS

## 2022-01-01 PROCEDURE — 92612 ENDOSCOPY SWALLOW (FEES) VID: CPT

## 2022-01-01 PROCEDURE — 99223 1ST HOSP IP/OBS HIGH 75: CPT | Mod: AI | Performed by: STUDENT IN AN ORGANIZED HEALTH CARE EDUCATION/TRAINING PROGRAM

## 2022-01-01 PROCEDURE — 700105 HCHG RX REV CODE 258: Performed by: HOSPITALIST

## 2022-01-01 PROCEDURE — 70450 CT HEAD/BRAIN W/O DYE: CPT

## 2022-01-01 PROCEDURE — 84145 PROCALCITONIN (PCT): CPT

## 2022-01-01 PROCEDURE — 97530 THERAPEUTIC ACTIVITIES: CPT | Mod: CQ

## 2022-01-01 PROCEDURE — 160035 HCHG PACU - 1ST 60 MINS PHASE I: Performed by: INTERNAL MEDICINE

## 2022-01-01 PROCEDURE — G0155 HHCP-SVS OF CSW,EA 15 MIN: HCPCS

## 2022-01-01 PROCEDURE — 99239 HOSP IP/OBS DSCHRG MGMT >30: CPT | Performed by: STUDENT IN AN ORGANIZED HEALTH CARE EDUCATION/TRAINING PROGRAM

## 2022-01-01 PROCEDURE — 71045 X-RAY EXAM CHEST 1 VIEW: CPT

## 2022-01-01 PROCEDURE — 92610 EVALUATE SWALLOWING FUNCTION: CPT

## 2022-01-01 PROCEDURE — G0156 HHCP-SVS OF AIDE,EA 15 MIN: HCPCS

## 2022-01-01 PROCEDURE — 84100 ASSAY OF PHOSPHORUS: CPT

## 2022-01-01 PROCEDURE — 0042T CT-CEREBRAL PERFUSION ANALYSIS: CPT

## 2022-01-01 PROCEDURE — 97116 GAIT TRAINING THERAPY: CPT

## 2022-01-01 PROCEDURE — 97162 PT EVAL MOD COMPLEX 30 MIN: CPT

## 2022-01-01 PROCEDURE — 700117 HCHG RX CONTRAST REV CODE 255: Performed by: STUDENT IN AN ORGANIZED HEALTH CARE EDUCATION/TRAINING PROGRAM

## 2022-01-01 PROCEDURE — 81001 URINALYSIS AUTO W/SCOPE: CPT

## 2022-01-01 PROCEDURE — 99233 SBSQ HOSP IP/OBS HIGH 50: CPT | Performed by: HOSPITALIST

## 2022-01-01 PROCEDURE — 93306 TTE W/DOPPLER COMPLETE: CPT | Mod: 26 | Performed by: INTERNAL MEDICINE

## 2022-01-01 PROCEDURE — 700117 HCHG RX CONTRAST REV CODE 255: Performed by: EMERGENCY MEDICINE

## 2022-01-01 PROCEDURE — 85027 COMPLETE CBC AUTOMATED: CPT

## 2022-01-01 PROCEDURE — 700105 HCHG RX REV CODE 258: Performed by: STUDENT IN AN ORGANIZED HEALTH CARE EDUCATION/TRAINING PROGRAM

## 2022-01-01 PROCEDURE — 93306 TTE W/DOPPLER COMPLETE: CPT

## 2022-01-01 PROCEDURE — 70496 CT ANGIOGRAPHY HEAD: CPT

## 2022-01-01 PROCEDURE — 70551 MRI BRAIN STEM W/O DYE: CPT

## 2022-01-01 PROCEDURE — 92526 ORAL FUNCTION THERAPY: CPT

## 2022-01-01 PROCEDURE — 83880 ASSAY OF NATRIURETIC PEPTIDE: CPT

## 2022-01-01 PROCEDURE — 86480 TB TEST CELL IMMUN MEASURE: CPT

## 2022-01-01 PROCEDURE — 92507 TX SP LANG VOICE COMM INDIV: CPT

## 2022-01-01 PROCEDURE — 71260 CT THORAX DX C+: CPT

## 2022-01-01 PROCEDURE — 95816 EEG AWAKE AND DROWSY: CPT | Mod: 26 | Performed by: STUDENT IN AN ORGANIZED HEALTH CARE EDUCATION/TRAINING PROGRAM

## 2022-01-01 PROCEDURE — 85730 THROMBOPLASTIN TIME PARTIAL: CPT

## 2022-01-01 PROCEDURE — 86900 BLOOD TYPING SEROLOGIC ABO: CPT

## 2022-01-01 PROCEDURE — 83036 HEMOGLOBIN GLYCOSYLATED A1C: CPT

## 2022-01-01 PROCEDURE — 87086 URINE CULTURE/COLONY COUNT: CPT

## 2022-01-01 PROCEDURE — 80048 BASIC METABOLIC PNL TOTAL CA: CPT

## 2022-01-01 PROCEDURE — 700111 HCHG RX REV CODE 636 W/ 250 OVERRIDE (IP): Performed by: ANESTHESIOLOGY

## 2022-01-01 PROCEDURE — 99255 IP/OBS CONSLTJ NEW/EST HI 80: CPT | Performed by: PSYCHIATRY & NEUROLOGY

## 2022-01-01 PROCEDURE — 700101 HCHG RX REV CODE 250: Performed by: STUDENT IN AN ORGANIZED HEALTH CARE EDUCATION/TRAINING PROGRAM

## 2022-01-01 PROCEDURE — 700105 HCHG RX REV CODE 258: Performed by: ANESTHESIOLOGY

## 2022-01-01 PROCEDURE — 99233 SBSQ HOSP IP/OBS HIGH 50: CPT | Performed by: PSYCHIATRY & NEUROLOGY

## 2022-01-01 PROCEDURE — 94760 N-INVAS EAR/PLS OXIMETRY 1: CPT

## 2022-01-01 PROCEDURE — 95816 EEG AWAKE AND DROWSY: CPT | Performed by: STUDENT IN AN ORGANIZED HEALTH CARE EDUCATION/TRAINING PROGRAM

## 2022-01-01 PROCEDURE — 94669 MECHANICAL CHEST WALL OSCILL: CPT

## 2022-01-01 PROCEDURE — 94640 AIRWAY INHALATION TREATMENT: CPT

## 2022-01-01 PROCEDURE — 160009 HCHG ANES TIME/MIN: Performed by: INTERNAL MEDICINE

## 2022-01-01 PROCEDURE — 84484 ASSAY OF TROPONIN QUANT: CPT

## 2022-01-01 PROCEDURE — 97165 OT EVAL LOW COMPLEX 30 MIN: CPT

## 2022-01-01 PROCEDURE — 160203 HCHG ENDO MINUTES - 1ST 30 MINS LEVEL 4: Performed by: INTERNAL MEDICINE

## 2022-01-01 PROCEDURE — 96372 THER/PROPH/DIAG INJ SC/IM: CPT

## 2022-01-01 PROCEDURE — 97530 THERAPEUTIC ACTIVITIES: CPT

## 2022-01-01 PROCEDURE — 99285 EMERGENCY DEPT VISIT HI MDM: CPT

## 2022-01-01 PROCEDURE — 99233 SBSQ HOSP IP/OBS HIGH 50: CPT | Performed by: STUDENT IN AN ORGANIZED HEALTH CARE EDUCATION/TRAINING PROGRAM

## 2022-01-01 PROCEDURE — 160048 HCHG OR STATISTICAL LEVEL 1-5: Performed by: INTERNAL MEDICINE

## 2022-01-01 PROCEDURE — 110372 HCHG SHELL REV 278: Performed by: INTERNAL MEDICINE

## 2022-01-01 PROCEDURE — 700102 HCHG RX REV CODE 250 W/ 637 OVERRIDE(OP)

## 2022-01-01 PROCEDURE — 85610 PROTHROMBIN TIME: CPT

## 2022-01-01 PROCEDURE — 87077 CULTURE AEROBIC IDENTIFY: CPT

## 2022-01-01 PROCEDURE — RXMED WILLOW AMBULATORY MEDICATION CHARGE: Performed by: REHABILITATION PRACTITIONER

## 2022-01-01 PROCEDURE — 80061 LIPID PANEL: CPT

## 2022-01-01 PROCEDURE — 96105 ASSESSMENT OF APHASIA: CPT

## 2022-01-01 PROCEDURE — 0DH63UZ INSERTION OF FEEDING DEVICE INTO STOMACH, PERCUTANEOUS APPROACH: ICD-10-PCS | Performed by: STUDENT IN AN ORGANIZED HEALTH CARE EDUCATION/TRAINING PROGRAM

## 2022-01-01 PROCEDURE — 700105 HCHG RX REV CODE 258

## 2022-01-01 PROCEDURE — 43246 EGD PLACE GASTROSTOMY TUBE: CPT | Performed by: INTERNAL MEDICINE

## 2022-01-01 PROCEDURE — 99232 SBSQ HOSP IP/OBS MODERATE 35: CPT | Performed by: NURSE PRACTITIONER

## 2022-01-01 PROCEDURE — 160002 HCHG RECOVERY MINUTES (STAT): Performed by: INTERNAL MEDICINE

## 2022-01-01 PROCEDURE — 00731 ANES UPR GI NDSC PX NOS: CPT | Performed by: ANESTHESIOLOGY

## 2022-01-01 PROCEDURE — 87186 SC STD MICRODIL/AGAR DIL: CPT

## 2022-01-01 PROCEDURE — 70498 CT ANGIOGRAPHY NECK: CPT

## 2022-01-01 PROCEDURE — 87426 SARSCOV CORONAVIRUS AG IA: CPT

## 2022-01-01 PROCEDURE — 4A00X4Z MEASUREMENT OF CENTRAL NERVOUS ELECTRICAL ACTIVITY, EXTERNAL APPROACH: ICD-10-PCS | Performed by: STUDENT IN AN ORGANIZED HEALTH CARE EDUCATION/TRAINING PROGRAM

## 2022-01-01 PROCEDURE — 86850 RBC ANTIBODY SCREEN: CPT

## 2022-01-01 PROCEDURE — 97112 NEUROMUSCULAR REEDUCATION: CPT | Mod: CO

## 2022-01-01 PROCEDURE — 84443 ASSAY THYROID STIM HORMONE: CPT

## 2022-01-01 PROCEDURE — 97602 WOUND(S) CARE NON-SELECTIVE: CPT

## 2022-01-01 PROCEDURE — 86901 BLOOD TYPING SEROLOGIC RH(D): CPT

## 2022-01-01 DEVICE — KIT 20 FRENCH PULL SAFETY PEG: Type: IMPLANTABLE DEVICE | Site: ABDOMEN | Status: FUNCTIONAL

## 2022-01-01 RX ORDER — IPRATROPIUM BROMIDE AND ALBUTEROL SULFATE 2.5; .5 MG/3ML; MG/3ML
3 SOLUTION RESPIRATORY (INHALATION)
Status: DISCONTINUED | OUTPATIENT
Start: 2022-01-01 | End: 2022-01-01 | Stop reason: HOSPADM

## 2022-01-01 RX ORDER — ACETAMINOPHEN 325 MG/1
650 TABLET ORAL EVERY 6 HOURS PRN
Status: DISCONTINUED | OUTPATIENT
Start: 2022-01-01 | End: 2022-01-01

## 2022-01-01 RX ORDER — SODIUM CHLORIDE, SODIUM LACTATE, POTASSIUM CHLORIDE, CALCIUM CHLORIDE 600; 310; 30; 20 MG/100ML; MG/100ML; MG/100ML; MG/100ML
1000 INJECTION, SOLUTION INTRAVENOUS CONTINUOUS
Status: DISCONTINUED | OUTPATIENT
Start: 2022-01-01 | End: 2022-01-01

## 2022-01-01 RX ORDER — ENOXAPARIN SODIUM 100 MG/ML
40 INJECTION SUBCUTANEOUS DAILY
Status: DISCONTINUED | OUTPATIENT
Start: 2022-01-01 | End: 2022-01-01

## 2022-01-01 RX ORDER — LISINOPRIL 5 MG/1
5 TABLET ORAL
Status: DISCONTINUED | OUTPATIENT
Start: 2022-01-01 | End: 2022-01-01

## 2022-01-01 RX ORDER — ATORVASTATIN CALCIUM 40 MG/1
40 TABLET, FILM COATED ORAL EVERY EVENING
Status: DISCONTINUED | OUTPATIENT
Start: 2022-01-01 | End: 2022-01-01

## 2022-01-01 RX ORDER — ONDANSETRON 4 MG/1
4 TABLET, ORALLY DISINTEGRATING ORAL EVERY 4 HOURS PRN
Status: DISCONTINUED | OUTPATIENT
Start: 2022-01-01 | End: 2022-01-01 | Stop reason: HOSPADM

## 2022-01-01 RX ORDER — ONDANSETRON 4 MG/1
4 TABLET, ORALLY DISINTEGRATING ORAL EVERY 4 HOURS PRN
Status: DISCONTINUED | OUTPATIENT
Start: 2022-01-01 | End: 2022-01-01

## 2022-01-01 RX ORDER — BISACODYL 10 MG
10 SUPPOSITORY, RECTAL RECTAL
Status: DISCONTINUED | OUTPATIENT
Start: 2022-01-01 | End: 2022-01-01 | Stop reason: HOSPADM

## 2022-01-01 RX ORDER — SODIUM CHLORIDE, SODIUM LACTATE, POTASSIUM CHLORIDE, CALCIUM CHLORIDE 600; 310; 30; 20 MG/100ML; MG/100ML; MG/100ML; MG/100ML
INJECTION, SOLUTION INTRAVENOUS
Status: DISCONTINUED | OUTPATIENT
Start: 2022-01-01 | End: 2022-01-01 | Stop reason: SURG

## 2022-01-01 RX ORDER — SODIUM CHLORIDE, SODIUM LACTATE, POTASSIUM CHLORIDE, CALCIUM CHLORIDE 600; 310; 30; 20 MG/100ML; MG/100ML; MG/100ML; MG/100ML
INJECTION, SOLUTION INTRAVENOUS CONTINUOUS
Status: DISCONTINUED | OUTPATIENT
Start: 2022-01-01 | End: 2022-01-01

## 2022-01-01 RX ORDER — ACETAMINOPHEN 650 MG/1
SUPPOSITORY RECTAL
Qty: 5 SUPPOSITORY | Refills: 10 | OUTPATIENT
Start: 2022-01-01

## 2022-01-01 RX ORDER — IPRATROPIUM BROMIDE AND ALBUTEROL SULFATE 2.5; .5 MG/3ML; MG/3ML
3 SOLUTION RESPIRATORY (INHALATION) 4 TIMES DAILY
Status: DISCONTINUED | OUTPATIENT
Start: 2022-01-01 | End: 2022-01-01

## 2022-01-01 RX ORDER — PROCHLORPERAZINE EDISYLATE 5 MG/ML
5-10 INJECTION INTRAMUSCULAR; INTRAVENOUS EVERY 4 HOURS PRN
Status: DISCONTINUED | OUTPATIENT
Start: 2022-01-01 | End: 2022-01-01

## 2022-01-01 RX ORDER — CARBOXYMETHYLCELLULOSE SODIUM 5 MG/ML
1 SOLUTION/ DROPS OPHTHALMIC PRN
Status: DISCONTINUED | OUTPATIENT
Start: 2022-01-01 | End: 2022-01-01 | Stop reason: HOSPADM

## 2022-01-01 RX ORDER — CEFAZOLIN SODIUM 1 G/3ML
INJECTION, POWDER, FOR SOLUTION INTRAMUSCULAR; INTRAVENOUS PRN
Status: DISCONTINUED | OUTPATIENT
Start: 2022-01-01 | End: 2022-01-01 | Stop reason: SURG

## 2022-01-01 RX ORDER — SODIUM CHLORIDE, SODIUM LACTATE, POTASSIUM CHLORIDE, AND CALCIUM CHLORIDE .6; .31; .03; .02 G/100ML; G/100ML; G/100ML; G/100ML
500 INJECTION, SOLUTION INTRAVENOUS ONCE
Status: COMPLETED | OUTPATIENT
Start: 2022-01-01 | End: 2022-01-01

## 2022-01-01 RX ORDER — PROMETHAZINE HYDROCHLORIDE 25 MG/1
12.5-25 TABLET ORAL EVERY 4 HOURS PRN
Status: DISCONTINUED | OUTPATIENT
Start: 2022-01-01 | End: 2022-01-01

## 2022-01-01 RX ORDER — BISACODYL 10 MG
10 SUPPOSITORY, RECTAL RECTAL
Status: DISCONTINUED | OUTPATIENT
Start: 2022-01-01 | End: 2022-01-01

## 2022-01-01 RX ORDER — MIDODRINE HYDROCHLORIDE 5 MG/1
10 TABLET ORAL
Status: DISCONTINUED | OUTPATIENT
Start: 2022-01-01 | End: 2022-01-01

## 2022-01-01 RX ORDER — ASPIRIN 81 MG/1
81 TABLET, CHEWABLE ORAL DAILY
Status: DISCONTINUED | OUTPATIENT
Start: 2022-01-01 | End: 2022-01-01

## 2022-01-01 RX ORDER — LORAZEPAM 2 MG/ML
0.5 CONCENTRATE ORAL
Qty: 360 ML | Refills: 0 | Status: SHIPPED | OUTPATIENT
Start: 2022-01-01 | End: 2023-12-19

## 2022-01-01 RX ORDER — ESCITALOPRAM OXALATE 10 MG/1
5 TABLET ORAL DAILY
Status: DISCONTINUED | OUTPATIENT
Start: 2022-01-01 | End: 2022-01-01

## 2022-01-01 RX ORDER — LORAZEPAM 2 MG/ML
0.5 CONCENTRATE ORAL EVERY 4 HOURS PRN
Qty: 360 ML | Refills: 0 | Status: SHIPPED | OUTPATIENT
Start: 2022-01-01 | End: 2022-01-01 | Stop reason: SDUPTHER

## 2022-01-01 RX ORDER — ESCITALOPRAM OXALATE 10 MG/1
10 TABLET ORAL DAILY
Qty: 30 TABLET | Refills: 0 | Status: SHIPPED | OUTPATIENT
Start: 2022-01-01

## 2022-01-01 RX ORDER — PROMETHAZINE HYDROCHLORIDE 25 MG/1
12.5-25 SUPPOSITORY RECTAL EVERY 4 HOURS PRN
Status: DISCONTINUED | OUTPATIENT
Start: 2022-01-01 | End: 2022-01-01

## 2022-01-01 RX ORDER — ATORVASTATIN CALCIUM 40 MG/1
80 TABLET, FILM COATED ORAL EVERY EVENING
Status: DISCONTINUED | OUTPATIENT
Start: 2022-01-01 | End: 2022-01-01

## 2022-01-01 RX ORDER — ASPIRIN 300 MG/1
300 SUPPOSITORY RECTAL ONCE
Status: COMPLETED | OUTPATIENT
Start: 2022-01-01 | End: 2022-01-01

## 2022-01-01 RX ORDER — INSULIN LISPRO 100 [IU]/ML
0.2 INJECTION, SOLUTION INTRAVENOUS; SUBCUTANEOUS
Status: DISCONTINUED | OUTPATIENT
Start: 2022-01-01 | End: 2022-01-01

## 2022-01-01 RX ORDER — MIDODRINE HYDROCHLORIDE 5 MG/1
5 TABLET ORAL
Status: DISCONTINUED | OUTPATIENT
Start: 2022-01-01 | End: 2022-01-01

## 2022-01-01 RX ORDER — LORAZEPAM 2 MG/ML
0.5 CONCENTRATE ORAL
Qty: 360 ML | Refills: 0 | Status: CANCELLED | OUTPATIENT
Start: 2022-01-01 | End: 2023-12-19

## 2022-01-01 RX ORDER — POLYETHYLENE GLYCOL 3350 17 G/17G
1 POWDER, FOR SOLUTION ORAL
Status: DISCONTINUED | OUTPATIENT
Start: 2022-01-01 | End: 2022-01-01

## 2022-01-01 RX ORDER — HALOPERIDOL 5 MG/ML
1 INJECTION INTRAMUSCULAR
Status: DISCONTINUED | OUTPATIENT
Start: 2022-01-01 | End: 2022-01-01 | Stop reason: HOSPADM

## 2022-01-01 RX ORDER — MAGNESIUM SULFATE HEPTAHYDRATE 40 MG/ML
4 INJECTION, SOLUTION INTRAVENOUS ONCE
Status: COMPLETED | OUTPATIENT
Start: 2022-01-01 | End: 2022-01-01

## 2022-01-01 RX ORDER — LORAZEPAM 2 MG/ML
1 INJECTION INTRAMUSCULAR
Status: DISCONTINUED | OUTPATIENT
Start: 2022-01-01 | End: 2022-01-01 | Stop reason: HOSPADM

## 2022-01-01 RX ORDER — INSULIN LISPRO 100 [IU]/ML
2-9 INJECTION, SOLUTION INTRAVENOUS; SUBCUTANEOUS
Status: DISCONTINUED | OUTPATIENT
Start: 2022-01-01 | End: 2022-01-01

## 2022-01-01 RX ORDER — ESCITALOPRAM OXALATE 10 MG/1
10 TABLET ORAL DAILY
Status: DISCONTINUED | OUTPATIENT
Start: 2022-01-01 | End: 2022-01-01 | Stop reason: HOSPADM

## 2022-01-01 RX ORDER — FUROSEMIDE 20 MG/1
20 TABLET ORAL
Status: DISCONTINUED | OUTPATIENT
Start: 2022-01-01 | End: 2022-01-01

## 2022-01-01 RX ORDER — LORAZEPAM 2 MG/ML
1 CONCENTRATE ORAL
Status: DISCONTINUED | OUTPATIENT
Start: 2022-01-01 | End: 2022-01-01 | Stop reason: HOSPADM

## 2022-01-01 RX ORDER — ONDANSETRON 2 MG/ML
4 INJECTION INTRAMUSCULAR; INTRAVENOUS EVERY 4 HOURS PRN
Status: DISCONTINUED | OUTPATIENT
Start: 2022-01-01 | End: 2022-01-01

## 2022-01-01 RX ORDER — BISACODYL 10 MG
SUPPOSITORY, RECTAL RECTAL
Qty: 5 SUPPOSITORY | Refills: 10 | OUTPATIENT
Start: 2022-01-01

## 2022-01-01 RX ORDER — ACETAMINOPHEN 500 MG
500 TABLET ORAL
Qty: 20 TABLET | Refills: 10 | Status: SHIPPED | OUTPATIENT
Start: 2022-01-01 | End: 2022-01-01

## 2022-01-01 RX ORDER — ONDANSETRON 2 MG/ML
4 INJECTION INTRAMUSCULAR; INTRAVENOUS
Status: DISCONTINUED | OUTPATIENT
Start: 2022-01-01 | End: 2022-01-01 | Stop reason: HOSPADM

## 2022-01-01 RX ORDER — LACTULOSE 20 G/30ML
10 SOLUTION ORAL
Status: DISCONTINUED | OUTPATIENT
Start: 2022-01-01 | End: 2022-01-01 | Stop reason: HOSPADM

## 2022-01-01 RX ORDER — ONDANSETRON 4 MG/1
TABLET, ORALLY DISINTEGRATING ORAL
Qty: 15 TABLET | Refills: 10 | Status: SHIPPED | OUTPATIENT
Start: 2022-01-01 | End: 2022-01-01

## 2022-01-01 RX ORDER — MORPHINE SULFATE 100 MG/5ML
5 SOLUTION ORAL
Qty: 120 ML | Refills: 0 | Status: SHIPPED | OUTPATIENT
Start: 2022-01-01 | End: 2023-12-09

## 2022-01-01 RX ORDER — MORPHINE SULFATE 100 MG/5ML
20 SOLUTION ORAL
Status: DISCONTINUED | OUTPATIENT
Start: 2022-01-01 | End: 2022-01-01 | Stop reason: HOSPADM

## 2022-01-01 RX ORDER — ACETAMINOPHEN 650 MG/1
650 SUPPOSITORY RECTAL EVERY 4 HOURS PRN
Status: DISCONTINUED | OUTPATIENT
Start: 2022-01-01 | End: 2022-01-01 | Stop reason: HOSPADM

## 2022-01-01 RX ORDER — ACETAMINOPHEN 325 MG/1
650 TABLET ORAL EVERY 4 HOURS PRN
Status: DISCONTINUED | OUTPATIENT
Start: 2022-01-01 | End: 2022-01-01 | Stop reason: HOSPADM

## 2022-01-01 RX ORDER — DIPHENHYDRAMINE HYDROCHLORIDE, ZINC ACETATE 2; .1 G/100G; G/100G
CREAM TOPICAL 4 TIMES DAILY PRN
Status: DISCONTINUED | OUTPATIENT
Start: 2022-01-01 | End: 2022-01-01 | Stop reason: HOSPADM

## 2022-01-01 RX ORDER — ONDANSETRON 2 MG/ML
4 INJECTION INTRAMUSCULAR; INTRAVENOUS EVERY 4 HOURS PRN
Status: DISCONTINUED | OUTPATIENT
Start: 2022-01-01 | End: 2022-01-01 | Stop reason: HOSPADM

## 2022-01-01 RX ORDER — DIPHENHYDRAMINE HYDROCHLORIDE 50 MG/ML
12.5 INJECTION INTRAMUSCULAR; INTRAVENOUS
Status: DISCONTINUED | OUTPATIENT
Start: 2022-01-01 | End: 2022-01-01 | Stop reason: HOSPADM

## 2022-01-01 RX ORDER — MORPHINE SULFATE 100 MG/5ML
10 SOLUTION ORAL
Status: DISCONTINUED | OUTPATIENT
Start: 2022-01-01 | End: 2022-01-01 | Stop reason: HOSPADM

## 2022-01-01 RX ORDER — IPRATROPIUM BROMIDE AND ALBUTEROL SULFATE 2.5; .5 MG/3ML; MG/3ML
3 SOLUTION RESPIRATORY (INHALATION)
Status: DISCONTINUED | OUTPATIENT
Start: 2022-01-01 | End: 2022-01-01

## 2022-01-01 RX ORDER — SODIUM CHLORIDE, SODIUM LACTATE, POTASSIUM CHLORIDE, AND CALCIUM CHLORIDE .6; .31; .03; .02 G/100ML; G/100ML; G/100ML; G/100ML
500 INJECTION, SOLUTION INTRAVENOUS ONCE
Status: ACTIVE | OUTPATIENT
Start: 2022-01-01 | End: 2022-01-01

## 2022-01-01 RX ORDER — AMOXICILLIN 250 MG
2 CAPSULE ORAL
Qty: 28 TABLET | Refills: 10 | Status: SHIPPED | OUTPATIENT
Start: 2022-01-01 | End: 2022-01-01

## 2022-01-01 RX ORDER — ESCITALOPRAM OXALATE 10 MG/1
10 TABLET ORAL
Qty: 14 TABLET | Refills: 10 | Status: SHIPPED | OUTPATIENT
Start: 2022-01-01 | End: 2022-01-01

## 2022-01-01 RX ORDER — AMLODIPINE BESYLATE 5 MG/1
5 TABLET ORAL
Status: DISCONTINUED | OUTPATIENT
Start: 2022-01-01 | End: 2022-01-01

## 2022-01-01 RX ADMIN — ESCITALOPRAM OXALATE 10 MG: 10 TABLET ORAL at 05:38

## 2022-01-01 RX ADMIN — MIDODRINE HYDROCHLORIDE 5 MG: 5 TABLET ORAL at 12:24

## 2022-01-01 RX ADMIN — ESCITALOPRAM OXALATE 5 MG: 10 TABLET ORAL at 06:07

## 2022-01-01 RX ADMIN — ATORVASTATIN CALCIUM 80 MG: 80 TABLET, FILM COATED ORAL at 16:52

## 2022-01-01 RX ADMIN — ESCITALOPRAM OXALATE 5 MG: 10 TABLET ORAL at 05:56

## 2022-01-01 RX ADMIN — INSULIN LISPRO 4 UNITS: 100 INJECTION, SOLUTION INTRAVENOUS; SUBCUTANEOUS at 12:38

## 2022-01-01 RX ADMIN — INSULIN GLARGINE-YFGN 11 UNITS: 100 INJECTION, SOLUTION SUBCUTANEOUS at 18:20

## 2022-01-01 RX ADMIN — ATORVASTATIN CALCIUM 40 MG: 40 TABLET, FILM COATED ORAL at 17:45

## 2022-01-01 RX ADMIN — ASPIRIN 81 MG 81 MG: 81 TABLET ORAL at 06:22

## 2022-01-01 RX ADMIN — CEFAZOLIN 1 G: 330 INJECTION, POWDER, FOR SOLUTION INTRAMUSCULAR; INTRAVENOUS at 09:18

## 2022-01-01 RX ADMIN — MIDODRINE HYDROCHLORIDE 5 MG: 5 TABLET ORAL at 11:23

## 2022-01-01 RX ADMIN — ASPIRIN 81 MG 81 MG: 81 TABLET ORAL at 05:56

## 2022-01-01 RX ADMIN — ENOXAPARIN SODIUM 40 MG: 40 INJECTION SUBCUTANEOUS at 17:21

## 2022-01-01 RX ADMIN — ASPIRIN 81 MG 81 MG: 81 TABLET ORAL at 04:20

## 2022-01-01 RX ADMIN — INSULIN HUMAN 3 UNITS: 100 INJECTION, SOLUTION PARENTERAL at 05:33

## 2022-01-01 RX ADMIN — INSULIN HUMAN 4 UNITS: 100 INJECTION, SOLUTION PARENTERAL at 05:55

## 2022-01-01 RX ADMIN — POLYETHYLENE GLYCOL 3350 1 PACKET: 17 POWDER, FOR SOLUTION ORAL at 05:52

## 2022-01-01 RX ADMIN — MIDODRINE HYDROCHLORIDE 5 MG: 5 TABLET ORAL at 09:14

## 2022-01-01 RX ADMIN — ATORVASTATIN CALCIUM 40 MG: 40 TABLET, FILM COATED ORAL at 17:21

## 2022-01-01 RX ADMIN — ASPIRIN 81 MG 81 MG: 81 TABLET ORAL at 04:16

## 2022-01-01 RX ADMIN — MIDODRINE HYDROCHLORIDE 5 MG: 5 TABLET ORAL at 16:13

## 2022-01-01 RX ADMIN — INSULIN HUMAN 3 UNITS: 100 INJECTION, SOLUTION PARENTERAL at 05:35

## 2022-01-01 RX ADMIN — INSULIN HUMAN 3 UNITS: 100 INJECTION, SOLUTION PARENTERAL at 11:55

## 2022-01-01 RX ADMIN — ATORVASTATIN CALCIUM 80 MG: 80 TABLET, FILM COATED ORAL at 18:07

## 2022-01-01 RX ADMIN — INSULIN HUMAN 2 UNITS: 100 INJECTION, SOLUTION PARENTERAL at 00:04

## 2022-01-01 RX ADMIN — ASPIRIN 81 MG 81 MG: 81 TABLET ORAL at 05:41

## 2022-01-01 RX ADMIN — ENOXAPARIN SODIUM 40 MG: 40 INJECTION SUBCUTANEOUS at 17:50

## 2022-01-01 RX ADMIN — ASPIRIN 300 MG: 300 SUPPOSITORY RECTAL at 18:21

## 2022-01-01 RX ADMIN — ENOXAPARIN SODIUM 40 MG: 40 INJECTION SUBCUTANEOUS at 17:14

## 2022-01-01 RX ADMIN — MORPHINE SULFATE 5 MG: 100 SOLUTION ORAL at 14:23

## 2022-01-01 RX ADMIN — ESCITALOPRAM OXALATE 5 MG: 10 TABLET ORAL at 04:45

## 2022-01-01 RX ADMIN — PROPOFOL 50 MG: 10 INJECTION, EMULSION INTRAVENOUS at 09:27

## 2022-01-01 RX ADMIN — ASPIRIN 81 MG 81 MG: 81 TABLET ORAL at 05:48

## 2022-01-01 RX ADMIN — BISACODYL 10 MG: 10 SUPPOSITORY RECTAL at 06:12

## 2022-01-01 RX ADMIN — ATORVASTATIN CALCIUM 80 MG: 80 TABLET, FILM COATED ORAL at 15:53

## 2022-01-01 RX ADMIN — INSULIN HUMAN 3 UNITS: 100 INJECTION, SOLUTION PARENTERAL at 05:13

## 2022-01-01 RX ADMIN — ENOXAPARIN SODIUM 40 MG: 40 INJECTION SUBCUTANEOUS at 17:57

## 2022-01-01 RX ADMIN — INSULIN LISPRO 4 UNITS: 100 INJECTION, SOLUTION INTRAVENOUS; SUBCUTANEOUS at 12:33

## 2022-01-01 RX ADMIN — INSULIN HUMAN 3 UNITS: 100 INJECTION, SOLUTION PARENTERAL at 16:40

## 2022-01-01 RX ADMIN — LISINOPRIL 5 MG: 5 TABLET ORAL at 06:06

## 2022-01-01 RX ADMIN — INSULIN GLARGINE-YFGN 11 UNITS: 100 INJECTION, SOLUTION SUBCUTANEOUS at 17:24

## 2022-01-01 RX ADMIN — ESCITALOPRAM OXALATE 5 MG: 10 TABLET ORAL at 05:47

## 2022-01-01 RX ADMIN — MIDODRINE HYDROCHLORIDE 5 MG: 5 TABLET ORAL at 07:50

## 2022-01-01 RX ADMIN — MORPHINE SULFATE 10 MG: 100 SOLUTION ORAL at 06:10

## 2022-01-01 RX ADMIN — INSULIN HUMAN 3 UNITS: 100 INJECTION, SOLUTION PARENTERAL at 00:29

## 2022-01-01 RX ADMIN — ATORVASTATIN CALCIUM 80 MG: 80 TABLET, FILM COATED ORAL at 17:06

## 2022-01-01 RX ADMIN — LISINOPRIL 5 MG: 5 TABLET ORAL at 05:53

## 2022-01-01 RX ADMIN — ASPIRIN 81 MG 81 MG: 81 TABLET ORAL at 05:43

## 2022-01-01 RX ADMIN — ENOXAPARIN SODIUM 40 MG: 40 INJECTION SUBCUTANEOUS at 17:42

## 2022-01-01 RX ADMIN — ENOXAPARIN SODIUM 40 MG: 40 INJECTION SUBCUTANEOUS at 17:13

## 2022-01-01 RX ADMIN — ASPIRIN 81 MG 81 MG: 81 TABLET ORAL at 05:23

## 2022-01-01 RX ADMIN — ASPIRIN 81 MG 81 MG: 81 TABLET ORAL at 06:08

## 2022-01-01 RX ADMIN — MIDODRINE HYDROCHLORIDE 5 MG: 5 TABLET ORAL at 17:30

## 2022-01-01 RX ADMIN — INSULIN HUMAN 3 UNITS: 100 INJECTION, SOLUTION PARENTERAL at 05:39

## 2022-01-01 RX ADMIN — INSULIN HUMAN 3 UNITS: 100 INJECTION, SOLUTION PARENTERAL at 12:26

## 2022-01-01 RX ADMIN — INSULIN HUMAN 3 UNITS: 100 INJECTION, SOLUTION PARENTERAL at 00:13

## 2022-01-01 RX ADMIN — LISINOPRIL 5 MG: 5 TABLET ORAL at 05:38

## 2022-01-01 RX ADMIN — INSULIN HUMAN 3 UNITS: 100 INJECTION, SOLUTION PARENTERAL at 00:46

## 2022-01-01 RX ADMIN — LISINOPRIL 5 MG: 5 TABLET ORAL at 05:15

## 2022-01-01 RX ADMIN — ASPIRIN 81 MG 81 MG: 81 TABLET ORAL at 05:31

## 2022-01-01 RX ADMIN — ASPIRIN 81 MG 81 MG: 81 TABLET ORAL at 04:21

## 2022-01-01 RX ADMIN — INSULIN HUMAN 3 UNITS: 100 INJECTION, SOLUTION PARENTERAL at 23:55

## 2022-01-01 RX ADMIN — ATORVASTATIN CALCIUM 80 MG: 80 TABLET, FILM COATED ORAL at 18:30

## 2022-01-01 RX ADMIN — IOHEXOL 75 ML: 350 INJECTION, SOLUTION INTRAVENOUS at 18:34

## 2022-01-01 RX ADMIN — MIDODRINE HYDROCHLORIDE 5 MG: 5 TABLET ORAL at 18:19

## 2022-01-01 RX ADMIN — ASPIRIN 81 MG 81 MG: 81 TABLET ORAL at 05:49

## 2022-01-01 RX ADMIN — INSULIN HUMAN 3 UNITS: 100 INJECTION, SOLUTION PARENTERAL at 17:03

## 2022-01-01 RX ADMIN — ESCITALOPRAM OXALATE 5 MG: 10 TABLET ORAL at 06:26

## 2022-01-01 RX ADMIN — CEFTRIAXONE SODIUM 1000 MG: 10 INJECTION, POWDER, FOR SOLUTION INTRAVENOUS at 17:27

## 2022-01-01 RX ADMIN — INSULIN LISPRO 2 UNITS: 100 INJECTION, SOLUTION INTRAVENOUS; SUBCUTANEOUS at 22:00

## 2022-01-01 RX ADMIN — INSULIN HUMAN 7 UNITS: 100 INJECTION, SOLUTION PARENTERAL at 00:25

## 2022-01-01 RX ADMIN — INSULIN GLARGINE-YFGN 10 UNITS: 100 INJECTION, SOLUTION SUBCUTANEOUS at 17:24

## 2022-01-01 RX ADMIN — ATORVASTATIN CALCIUM 80 MG: 80 TABLET, FILM COATED ORAL at 18:02

## 2022-01-01 RX ADMIN — ENOXAPARIN SODIUM 40 MG: 40 INJECTION SUBCUTANEOUS at 18:05

## 2022-01-01 RX ADMIN — INSULIN GLARGINE-YFGN 11 UNITS: 100 INJECTION, SOLUTION SUBCUTANEOUS at 16:36

## 2022-01-01 RX ADMIN — MIDODRINE HYDROCHLORIDE 5 MG: 5 TABLET ORAL at 11:30

## 2022-01-01 RX ADMIN — INSULIN LISPRO 4 UNITS: 100 INJECTION, SOLUTION INTRAVENOUS; SUBCUTANEOUS at 06:13

## 2022-01-01 RX ADMIN — INSULIN HUMAN 4 UNITS: 100 INJECTION, SOLUTION PARENTERAL at 12:53

## 2022-01-01 RX ADMIN — MORPHINE SULFATE 10 MG: 100 SOLUTION ORAL at 18:11

## 2022-01-01 RX ADMIN — MIDODRINE HYDROCHLORIDE 5 MG: 5 TABLET ORAL at 17:24

## 2022-01-01 RX ADMIN — ACETAMINOPHEN 650 MG: 325 TABLET, FILM COATED ORAL at 09:03

## 2022-01-01 RX ADMIN — INSULIN HUMAN 3 UNITS: 100 INJECTION, SOLUTION PARENTERAL at 23:58

## 2022-01-01 RX ADMIN — SODIUM CHLORIDE, POTASSIUM CHLORIDE, SODIUM LACTATE AND CALCIUM CHLORIDE 500 ML: 600; 310; 30; 20 INJECTION, SOLUTION INTRAVENOUS at 16:14

## 2022-01-01 RX ADMIN — ATORVASTATIN CALCIUM 80 MG: 80 TABLET, FILM COATED ORAL at 16:08

## 2022-01-01 RX ADMIN — IPRATROPIUM BROMIDE AND ALBUTEROL SULFATE 3 ML: .5; 2.5 SOLUTION RESPIRATORY (INHALATION) at 19:14

## 2022-01-01 RX ADMIN — LISINOPRIL 5 MG: 5 TABLET ORAL at 12:35

## 2022-01-01 RX ADMIN — IOHEXOL 100 ML: 350 INJECTION, SOLUTION INTRAVENOUS at 14:30

## 2022-01-01 RX ADMIN — INSULIN HUMAN 4 UNITS: 100 INJECTION, SOLUTION PARENTERAL at 13:00

## 2022-01-01 RX ADMIN — MIDODRINE HYDROCHLORIDE 5 MG: 5 TABLET ORAL at 17:14

## 2022-01-01 RX ADMIN — ASPIRIN 81 MG 81 MG: 81 TABLET ORAL at 06:17

## 2022-01-01 RX ADMIN — ENOXAPARIN SODIUM 40 MG: 40 INJECTION SUBCUTANEOUS at 17:24

## 2022-01-01 RX ADMIN — ASPIRIN 81 MG 81 MG: 81 TABLET ORAL at 04:51

## 2022-01-01 RX ADMIN — INSULIN HUMAN 4 UNITS: 100 INJECTION, SOLUTION PARENTERAL at 16:14

## 2022-01-01 RX ADMIN — INSULIN LISPRO 3 UNITS: 100 INJECTION, SOLUTION INTRAVENOUS; SUBCUTANEOUS at 12:38

## 2022-01-01 RX ADMIN — FUROSEMIDE 20 MG: 20 TABLET ORAL at 01:47

## 2022-01-01 RX ADMIN — ENOXAPARIN SODIUM 40 MG: 40 INJECTION SUBCUTANEOUS at 17:37

## 2022-01-01 RX ADMIN — CEFTRIAXONE SODIUM 1000 MG: 10 INJECTION, POWDER, FOR SOLUTION INTRAVENOUS at 17:24

## 2022-01-01 RX ADMIN — ASPIRIN 81 MG 81 MG: 81 TABLET ORAL at 06:26

## 2022-01-01 RX ADMIN — ENOXAPARIN SODIUM 40 MG: 40 INJECTION SUBCUTANEOUS at 17:32

## 2022-01-01 RX ADMIN — CEFTRIAXONE SODIUM 1000 MG: 10 INJECTION, POWDER, FOR SOLUTION INTRAVENOUS at 15:54

## 2022-01-01 RX ADMIN — INSULIN HUMAN 3 UNITS: 100 INJECTION, SOLUTION PARENTERAL at 17:21

## 2022-01-01 RX ADMIN — ASPIRIN 81 MG 81 MG: 81 TABLET ORAL at 05:34

## 2022-01-01 RX ADMIN — ASPIRIN 81 MG 81 MG: 81 TABLET ORAL at 05:04

## 2022-01-01 RX ADMIN — INSULIN HUMAN 4 UNITS: 100 INJECTION, SOLUTION PARENTERAL at 12:23

## 2022-01-01 RX ADMIN — ATORVASTATIN CALCIUM 80 MG: 80 TABLET, FILM COATED ORAL at 17:24

## 2022-01-01 RX ADMIN — INSULIN HUMAN 3 UNITS: 100 INJECTION, SOLUTION PARENTERAL at 00:37

## 2022-01-01 RX ADMIN — POLYETHYLENE GLYCOL 3350 1 PACKET: 17 POWDER, FOR SOLUTION ORAL at 04:52

## 2022-01-01 RX ADMIN — SODIUM CHLORIDE, POTASSIUM CHLORIDE, SODIUM LACTATE AND CALCIUM CHLORIDE: 600; 310; 30; 20 INJECTION, SOLUTION INTRAVENOUS at 00:00

## 2022-01-01 RX ADMIN — INSULIN HUMAN 3 UNITS: 100 INJECTION, SOLUTION PARENTERAL at 00:33

## 2022-01-01 RX ADMIN — INSULIN LISPRO 3 UNITS: 100 INJECTION, SOLUTION INTRAVENOUS; SUBCUTANEOUS at 17:00

## 2022-01-01 RX ADMIN — INSULIN HUMAN 4 UNITS: 100 INJECTION, SOLUTION PARENTERAL at 23:53

## 2022-01-01 RX ADMIN — INSULIN LISPRO 3 UNITS: 100 INJECTION, SOLUTION INTRAVENOUS; SUBCUTANEOUS at 12:33

## 2022-01-01 RX ADMIN — ACETAMINOPHEN 650 MG: 325 TABLET, FILM COATED ORAL at 04:47

## 2022-01-01 RX ADMIN — INSULIN LISPRO 5 UNITS: 100 INJECTION, SOLUTION INTRAVENOUS; SUBCUTANEOUS at 12:05

## 2022-01-01 RX ADMIN — INSULIN HUMAN 3 UNITS: 100 INJECTION, SOLUTION PARENTERAL at 06:02

## 2022-01-01 RX ADMIN — ASPIRIN 81 MG 81 MG: 81 TABLET ORAL at 06:00

## 2022-01-01 RX ADMIN — INSULIN LISPRO 5 UNITS: 100 INJECTION, SOLUTION INTRAVENOUS; SUBCUTANEOUS at 06:15

## 2022-01-01 RX ADMIN — ATORVASTATIN CALCIUM 80 MG: 80 TABLET, FILM COATED ORAL at 17:36

## 2022-01-01 RX ADMIN — INSULIN HUMAN 3 UNITS: 100 INJECTION, SOLUTION PARENTERAL at 00:25

## 2022-01-01 RX ADMIN — ENOXAPARIN SODIUM 40 MG: 40 INJECTION SUBCUTANEOUS at 16:35

## 2022-01-01 RX ADMIN — ENOXAPARIN SODIUM 40 MG: 40 INJECTION SUBCUTANEOUS at 15:55

## 2022-01-01 RX ADMIN — LISINOPRIL 5 MG: 5 TABLET ORAL at 05:43

## 2022-01-01 RX ADMIN — INSULIN GLARGINE-YFGN 11 UNITS: 100 INJECTION, SOLUTION SUBCUTANEOUS at 18:07

## 2022-01-01 RX ADMIN — ENOXAPARIN SODIUM 40 MG: 40 INJECTION SUBCUTANEOUS at 17:29

## 2022-01-01 RX ADMIN — MIDODRINE HYDROCHLORIDE 5 MG: 5 TABLET ORAL at 05:46

## 2022-01-01 RX ADMIN — ATORVASTATIN CALCIUM 80 MG: 80 TABLET, FILM COATED ORAL at 17:48

## 2022-01-01 RX ADMIN — LISINOPRIL 5 MG: 5 TABLET ORAL at 05:35

## 2022-01-01 RX ADMIN — MIDODRINE HYDROCHLORIDE 10 MG: 5 TABLET ORAL at 18:02

## 2022-01-01 RX ADMIN — INSULIN HUMAN 2 UNITS: 100 INJECTION, SOLUTION PARENTERAL at 11:55

## 2022-01-01 RX ADMIN — ATORVASTATIN CALCIUM 40 MG: 40 TABLET, FILM COATED ORAL at 16:55

## 2022-01-01 RX ADMIN — ESCITALOPRAM OXALATE 5 MG: 10 TABLET ORAL at 05:23

## 2022-01-01 RX ADMIN — INSULIN HUMAN 4 UNITS: 100 INJECTION, SOLUTION PARENTERAL at 16:49

## 2022-01-01 RX ADMIN — ATORVASTATIN CALCIUM 80 MG: 80 TABLET, FILM COATED ORAL at 17:29

## 2022-01-01 RX ADMIN — LISINOPRIL 5 MG: 5 TABLET ORAL at 05:41

## 2022-01-01 RX ADMIN — INSULIN HUMAN 4 UNITS: 100 INJECTION, SOLUTION PARENTERAL at 00:19

## 2022-01-01 RX ADMIN — INSULIN GLARGINE-YFGN 11 UNITS: 100 INJECTION, SOLUTION SUBCUTANEOUS at 16:15

## 2022-01-01 RX ADMIN — ESCITALOPRAM OXALATE 5 MG: 10 TABLET ORAL at 05:24

## 2022-01-01 RX ADMIN — ENOXAPARIN SODIUM 40 MG: 40 INJECTION SUBCUTANEOUS at 16:08

## 2022-01-01 RX ADMIN — INSULIN LISPRO 4 UNITS: 100 INJECTION, SOLUTION INTRAVENOUS; SUBCUTANEOUS at 12:05

## 2022-01-01 RX ADMIN — INSULIN LISPRO 5 UNITS: 100 INJECTION, SOLUTION INTRAVENOUS; SUBCUTANEOUS at 05:49

## 2022-01-01 RX ADMIN — INSULIN LISPRO 3 UNITS: 100 INJECTION, SOLUTION INTRAVENOUS; SUBCUTANEOUS at 17:48

## 2022-01-01 RX ADMIN — INSULIN HUMAN 2 UNITS: 100 INJECTION, SOLUTION PARENTERAL at 06:35

## 2022-01-01 RX ADMIN — INSULIN LISPRO 4 UNITS: 100 INJECTION, SOLUTION INTRAVENOUS; SUBCUTANEOUS at 17:48

## 2022-01-01 RX ADMIN — INSULIN HUMAN 4 UNITS: 100 INJECTION, SOLUTION PARENTERAL at 18:04

## 2022-01-01 RX ADMIN — ACETAMINOPHEN 650 MG: 325 TABLET, FILM COATED ORAL at 17:28

## 2022-01-01 RX ADMIN — ASPIRIN 81 MG 81 MG: 81 TABLET ORAL at 05:33

## 2022-01-01 RX ADMIN — ACETAMINOPHEN 650 MG: 325 TABLET, FILM COATED ORAL at 15:54

## 2022-01-01 RX ADMIN — IPRATROPIUM BROMIDE AND ALBUTEROL SULFATE 3 ML: .5; 2.5 SOLUTION RESPIRATORY (INHALATION) at 10:17

## 2022-01-01 RX ADMIN — INSULIN HUMAN 4 UNITS: 100 INJECTION, SOLUTION PARENTERAL at 23:48

## 2022-01-01 RX ADMIN — INSULIN HUMAN 3 UNITS: 100 INJECTION, SOLUTION PARENTERAL at 05:00

## 2022-01-01 RX ADMIN — IOHEXOL 40 ML: 350 INJECTION, SOLUTION INTRAVENOUS at 14:45

## 2022-01-01 RX ADMIN — SODIUM CHLORIDE, POTASSIUM CHLORIDE, SODIUM LACTATE AND CALCIUM CHLORIDE 1000 ML: 600; 310; 30; 20 INJECTION, SOLUTION INTRAVENOUS at 05:44

## 2022-01-01 RX ADMIN — INSULIN HUMAN 3 UNITS: 100 INJECTION, SOLUTION PARENTERAL at 17:38

## 2022-01-01 RX ADMIN — INSULIN HUMAN 4 UNITS: 100 INJECTION, SOLUTION PARENTERAL at 13:56

## 2022-01-01 RX ADMIN — ACETAMINOPHEN 650 MG: 325 TABLET, FILM COATED ORAL at 03:37

## 2022-01-01 RX ADMIN — INSULIN HUMAN 4 UNITS: 100 INJECTION, SOLUTION PARENTERAL at 17:42

## 2022-01-01 RX ADMIN — MIDODRINE HYDROCHLORIDE 5 MG: 5 TABLET ORAL at 18:40

## 2022-01-01 RX ADMIN — INSULIN HUMAN 4 UNITS: 100 INJECTION, SOLUTION PARENTERAL at 11:43

## 2022-01-01 RX ADMIN — INSULIN HUMAN 1 UNITS: 100 INJECTION, SOLUTION PARENTERAL at 23:45

## 2022-01-01 RX ADMIN — INSULIN HUMAN 4 UNITS: 100 INJECTION, SOLUTION PARENTERAL at 23:26

## 2022-01-01 RX ADMIN — INSULIN HUMAN 3 UNITS: 100 INJECTION, SOLUTION PARENTERAL at 23:56

## 2022-01-01 RX ADMIN — INSULIN GLARGINE-YFGN 11 UNITS: 100 INJECTION, SOLUTION SUBCUTANEOUS at 18:30

## 2022-01-01 RX ADMIN — MAGNESIUM HYDROXIDE 30 ML: 400 SUSPENSION ORAL at 05:52

## 2022-01-01 RX ADMIN — INSULIN HUMAN 4 UNITS: 100 INJECTION, SOLUTION PARENTERAL at 05:25

## 2022-01-01 RX ADMIN — ENOXAPARIN SODIUM 40 MG: 40 INJECTION SUBCUTANEOUS at 18:25

## 2022-01-01 RX ADMIN — ATORVASTATIN CALCIUM 80 MG: 80 TABLET, FILM COATED ORAL at 17:32

## 2022-01-01 RX ADMIN — ENOXAPARIN SODIUM 40 MG: 40 INJECTION SUBCUTANEOUS at 18:01

## 2022-01-01 RX ADMIN — ESCITALOPRAM OXALATE 5 MG: 10 TABLET ORAL at 07:20

## 2022-01-01 RX ADMIN — INSULIN HUMAN 3 UNITS: 100 INJECTION, SOLUTION PARENTERAL at 11:48

## 2022-01-01 RX ADMIN — MIDODRINE HYDROCHLORIDE 5 MG: 5 TABLET ORAL at 07:59

## 2022-01-01 RX ADMIN — INSULIN HUMAN 4 UNITS: 100 INJECTION, SOLUTION PARENTERAL at 18:30

## 2022-01-01 RX ADMIN — INSULIN HUMAN 4 UNITS: 100 INJECTION, SOLUTION PARENTERAL at 18:27

## 2022-01-01 RX ADMIN — INSULIN HUMAN 4 UNITS: 100 INJECTION, SOLUTION PARENTERAL at 00:39

## 2022-01-01 RX ADMIN — ESCITALOPRAM OXALATE 5 MG: 10 TABLET ORAL at 18:23

## 2022-01-01 RX ADMIN — CEFTRIAXONE SODIUM 1000 MG: 10 INJECTION, POWDER, FOR SOLUTION INTRAVENOUS at 18:05

## 2022-01-01 RX ADMIN — INSULIN LISPRO 4 UNITS: 100 INJECTION, SOLUTION INTRAVENOUS; SUBCUTANEOUS at 17:00

## 2022-01-01 RX ADMIN — LISINOPRIL 5 MG: 5 TABLET ORAL at 05:33

## 2022-01-01 RX ADMIN — INSULIN HUMAN 3 UNITS: 100 INJECTION, SOLUTION PARENTERAL at 06:15

## 2022-01-01 RX ADMIN — INSULIN HUMAN 1 UNITS: 100 INJECTION, SOLUTION PARENTERAL at 11:55

## 2022-01-01 RX ADMIN — ATORVASTATIN CALCIUM 80 MG: 80 TABLET, FILM COATED ORAL at 18:25

## 2022-01-01 RX ADMIN — MIDODRINE HYDROCHLORIDE 5 MG: 5 TABLET ORAL at 12:19

## 2022-01-01 RX ADMIN — LISINOPRIL 5 MG: 5 TABLET ORAL at 04:51

## 2022-01-01 RX ADMIN — ESCITALOPRAM OXALATE 5 MG: 10 TABLET ORAL at 06:21

## 2022-01-01 RX ADMIN — LISINOPRIL 5 MG: 5 TABLET ORAL at 04:20

## 2022-01-01 RX ADMIN — ASPIRIN 81 MG 81 MG: 81 TABLET ORAL at 04:45

## 2022-01-01 RX ADMIN — MORPHINE SULFATE 10 MG: 100 SOLUTION ORAL at 18:10

## 2022-01-01 RX ADMIN — LISINOPRIL 5 MG: 5 TABLET ORAL at 05:49

## 2022-01-01 RX ADMIN — ENOXAPARIN SODIUM 40 MG: 40 INJECTION SUBCUTANEOUS at 17:48

## 2022-01-01 RX ADMIN — INSULIN HUMAN 3 UNITS: 100 INJECTION, SOLUTION PARENTERAL at 17:53

## 2022-01-01 RX ADMIN — ASPIRIN 81 MG 81 MG: 81 TABLET ORAL at 05:52

## 2022-01-01 RX ADMIN — INSULIN HUMAN 3 UNITS: 100 INJECTION, SOLUTION PARENTERAL at 12:23

## 2022-01-01 RX ADMIN — ATORVASTATIN CALCIUM 80 MG: 80 TABLET, FILM COATED ORAL at 18:05

## 2022-01-01 RX ADMIN — ATORVASTATIN CALCIUM 80 MG: 80 TABLET, FILM COATED ORAL at 17:14

## 2022-01-01 RX ADMIN — ENOXAPARIN SODIUM 40 MG: 40 INJECTION SUBCUTANEOUS at 16:14

## 2022-01-01 RX ADMIN — ENOXAPARIN SODIUM 40 MG: 40 INJECTION SUBCUTANEOUS at 18:30

## 2022-01-01 RX ADMIN — INSULIN HUMAN 1 UNITS: 100 INJECTION, SOLUTION PARENTERAL at 16:56

## 2022-01-01 RX ADMIN — ATORVASTATIN CALCIUM 80 MG: 80 TABLET, FILM COATED ORAL at 17:42

## 2022-01-01 RX ADMIN — ASPIRIN 81 MG 81 MG: 81 TABLET ORAL at 07:21

## 2022-01-01 RX ADMIN — INSULIN HUMAN 3 UNITS: 100 INJECTION, SOLUTION PARENTERAL at 18:05

## 2022-01-01 RX ADMIN — INSULIN HUMAN 1 UNITS: 100 INJECTION, SOLUTION PARENTERAL at 00:02

## 2022-01-01 RX ADMIN — LISINOPRIL 5 MG: 5 TABLET ORAL at 05:31

## 2022-01-01 RX ADMIN — PROPOFOL 50 MG: 10 INJECTION, EMULSION INTRAVENOUS at 09:43

## 2022-01-01 RX ADMIN — ATORVASTATIN CALCIUM 80 MG: 80 TABLET, FILM COATED ORAL at 17:04

## 2022-01-01 RX ADMIN — MAGNESIUM SULFATE HEPTAHYDRATE 4 G: 40 INJECTION, SOLUTION INTRAVENOUS at 09:15

## 2022-01-01 RX ADMIN — ESCITALOPRAM OXALATE 5 MG: 10 TABLET ORAL at 06:08

## 2022-01-01 RX ADMIN — ATORVASTATIN CALCIUM 80 MG: 80 TABLET, FILM COATED ORAL at 17:57

## 2022-01-01 RX ADMIN — SODIUM CHLORIDE, POTASSIUM CHLORIDE, SODIUM LACTATE AND CALCIUM CHLORIDE: 600; 310; 30; 20 INJECTION, SOLUTION INTRAVENOUS at 09:17

## 2022-01-01 RX ADMIN — ATORVASTATIN CALCIUM 80 MG: 80 TABLET, FILM COATED ORAL at 17:40

## 2022-01-01 RX ADMIN — INSULIN GLARGINE-YFGN 11 UNITS: 100 INJECTION, SOLUTION SUBCUTANEOUS at 17:27

## 2022-01-01 RX ADMIN — DEXTROSE MONOHYDRATE 25 G: 100 INJECTION, SOLUTION INTRAVENOUS at 17:06

## 2022-01-01 RX ADMIN — ATORVASTATIN CALCIUM 80 MG: 80 TABLET, FILM COATED ORAL at 16:35

## 2022-01-01 RX ADMIN — ATORVASTATIN CALCIUM 80 MG: 80 TABLET, FILM COATED ORAL at 18:19

## 2022-01-01 RX ADMIN — INSULIN HUMAN 3 UNITS: 100 INJECTION, SOLUTION PARENTERAL at 12:27

## 2022-01-01 RX ADMIN — INSULIN HUMAN 3 UNITS: 100 INJECTION, SOLUTION PARENTERAL at 06:10

## 2022-01-01 RX ADMIN — ASPIRIN 81 MG 81 MG: 81 TABLET ORAL at 05:15

## 2022-01-01 RX ADMIN — MIDODRINE HYDROCHLORIDE 5 MG: 5 TABLET ORAL at 08:32

## 2022-01-01 RX ADMIN — ATORVASTATIN CALCIUM 40 MG: 40 TABLET, FILM COATED ORAL at 17:14

## 2022-01-01 RX ADMIN — MIDODRINE HYDROCHLORIDE 5 MG: 5 TABLET ORAL at 15:53

## 2022-01-01 RX ADMIN — LISINOPRIL 5 MG: 5 TABLET ORAL at 05:34

## 2022-01-01 RX ADMIN — ENOXAPARIN SODIUM 40 MG: 40 INJECTION SUBCUTANEOUS at 18:18

## 2022-01-01 RX ADMIN — ASPIRIN 81 MG 81 MG: 81 TABLET ORAL at 04:01

## 2022-01-01 RX ADMIN — LISINOPRIL 5 MG: 5 TABLET ORAL at 04:45

## 2022-01-01 RX ADMIN — INSULIN HUMAN 3 UNITS: 100 INJECTION, SOLUTION PARENTERAL at 05:18

## 2022-01-01 RX ADMIN — IPRATROPIUM BROMIDE AND ALBUTEROL SULFATE 3 ML: .5; 2.5 SOLUTION RESPIRATORY (INHALATION) at 10:19

## 2022-01-01 RX ADMIN — ATORVASTATIN CALCIUM 80 MG: 80 TABLET, FILM COATED ORAL at 18:00

## 2022-01-01 RX ADMIN — ACETAMINOPHEN 650 MG: 325 TABLET, FILM COATED ORAL at 17:01

## 2022-01-01 RX ADMIN — MIDODRINE HYDROCHLORIDE 5 MG: 5 TABLET ORAL at 09:45

## 2022-01-01 RX ADMIN — ATORVASTATIN CALCIUM 40 MG: 40 TABLET, FILM COATED ORAL at 04:05

## 2022-01-01 RX ADMIN — ATORVASTATIN CALCIUM 80 MG: 80 TABLET, FILM COATED ORAL at 16:14

## 2022-01-01 RX ADMIN — ENOXAPARIN SODIUM 40 MG: 40 INJECTION SUBCUTANEOUS at 18:07

## 2022-01-01 RX ADMIN — ENOXAPARIN SODIUM 40 MG: 40 INJECTION SUBCUTANEOUS at 17:45

## 2022-01-01 RX ADMIN — ATORVASTATIN CALCIUM 80 MG: 80 TABLET, FILM COATED ORAL at 17:50

## 2022-01-01 RX ADMIN — Medication: at 00:28

## 2022-01-01 RX ADMIN — INSULIN HUMAN 2 UNITS: 100 INJECTION, SOLUTION PARENTERAL at 06:11

## 2022-01-01 RX ADMIN — INSULIN GLARGINE-YFGN 11 UNITS: 100 INJECTION, SOLUTION SUBCUTANEOUS at 17:15

## 2022-01-01 RX ADMIN — INSULIN LISPRO 4 UNITS: 100 INJECTION, SOLUTION INTRAVENOUS; SUBCUTANEOUS at 07:55

## 2022-01-01 RX ADMIN — INSULIN HUMAN 3 UNITS: 100 INJECTION, SOLUTION PARENTERAL at 06:01

## 2022-01-01 RX ADMIN — ASPIRIN 81 MG 81 MG: 81 TABLET ORAL at 04:05

## 2022-01-01 RX ADMIN — ASPIRIN 81 MG 81 MG: 81 TABLET ORAL at 05:24

## 2022-01-01 SDOH — ECONOMIC STABILITY: HOUSING INSECURITY: EVIDENCE OF SMOKING MATERIAL: 0

## 2022-01-01 SDOH — ECONOMIC STABILITY: HOUSING INSECURITY: HOME SAFETY: LIVES AT GROUP HOME

## 2022-01-01 ASSESSMENT — COGNITIVE AND FUNCTIONAL STATUS - GENERAL
STANDING UP FROM CHAIR USING ARMS: A LOT
HELP NEEDED FOR BATHING: A LOT
MOVING FROM LYING ON BACK TO SITTING ON SIDE OF FLAT BED: UNABLE
MOVING FROM LYING ON BACK TO SITTING ON SIDE OF FLAT BED: UNABLE
SUGGESTED CMS G CODE MODIFIER MOBILITY: CN
MOVING TO AND FROM BED TO CHAIR: UNABLE
HELP NEEDED FOR BATHING: TOTAL
MOBILITY SCORE: 6
STANDING UP FROM CHAIR USING ARMS: TOTAL
WALKING IN HOSPITAL ROOM: TOTAL
WALKING IN HOSPITAL ROOM: TOTAL
DRESSING REGULAR UPPER BODY CLOTHING: TOTAL
MOBILITY SCORE: 9
MOVING TO AND FROM BED TO CHAIR: A LOT
MOVING TO AND FROM BED TO CHAIR: UNABLE
TURNING FROM BACK TO SIDE WHILE IN FLAT BAD: UNABLE
MOVING FROM LYING ON BACK TO SITTING ON SIDE OF FLAT BED: A LOT
SUGGESTED CMS G CODE MODIFIER MOBILITY: CN
DRESSING REGULAR UPPER BODY CLOTHING: TOTAL
TURNING FROM BACK TO SIDE WHILE IN FLAT BAD: A LOT
DRESSING REGULAR LOWER BODY CLOTHING: TOTAL
DRESSING REGULAR UPPER BODY CLOTHING: TOTAL
EATING MEALS: TOTAL
EATING MEALS: A LOT
SUGGESTED CMS G CODE MODIFIER MOBILITY: CM
CLIMB 3 TO 5 STEPS WITH RAILING: TOTAL
HELP NEEDED FOR BATHING: A LOT
TURNING FROM BACK TO SIDE WHILE IN FLAT BAD: A LITTLE
DAILY ACTIVITIY SCORE: 6
MOVING FROM LYING ON BACK TO SITTING ON SIDE OF FLAT BED: UNABLE
DRESSING REGULAR LOWER BODY CLOTHING: A LOT
TURNING FROM BACK TO SIDE WHILE IN FLAT BAD: UNABLE
SUGGESTED CMS G CODE MODIFIER MOBILITY: CN
WALKING IN HOSPITAL ROOM: TOTAL
PERSONAL GROOMING: A LOT
CLIMB 3 TO 5 STEPS WITH RAILING: TOTAL
DRESSING REGULAR UPPER BODY CLOTHING: A LOT
TOILETING: TOTAL
MOVING FROM LYING ON BACK TO SITTING ON SIDE OF FLAT BED: UNABLE
DAILY ACTIVITIY SCORE: 10
TOILETING: TOTAL
CLIMB 3 TO 5 STEPS WITH RAILING: TOTAL
STANDING UP FROM CHAIR USING ARMS: A LOT
MOVING FROM LYING ON BACK TO SITTING ON SIDE OF FLAT BED: UNABLE
STANDING UP FROM CHAIR USING ARMS: TOTAL
MOVING TO AND FROM BED TO CHAIR: UNABLE
MOVING TO AND FROM BED TO CHAIR: UNABLE
WALKING IN HOSPITAL ROOM: TOTAL
SUGGESTED CMS G CODE MODIFIER MOBILITY: CK
STANDING UP FROM CHAIR USING ARMS: TOTAL
SUGGESTED CMS G CODE MODIFIER DAILY ACTIVITY: CN
EATING MEALS: TOTAL
EATING MEALS: TOTAL
DRESSING REGULAR UPPER BODY CLOTHING: TOTAL
DRESSING REGULAR LOWER BODY CLOTHING: TOTAL
PERSONAL GROOMING: TOTAL
MOBILITY SCORE: 15
WALKING IN HOSPITAL ROOM: TOTAL
EATING MEALS: TOTAL
DRESSING REGULAR UPPER BODY CLOTHING: TOTAL
DRESSING REGULAR LOWER BODY CLOTHING: TOTAL
MOVING TO AND FROM BED TO CHAIR: UNABLE
MOBILITY SCORE: 6
HELP NEEDED FOR BATHING: TOTAL
MOBILITY SCORE: 6
WALKING IN HOSPITAL ROOM: A LOT
DRESSING REGULAR LOWER BODY CLOTHING: TOTAL
CLIMB 3 TO 5 STEPS WITH RAILING: TOTAL
TOILETING: TOTAL
WALKING IN HOSPITAL ROOM: TOTAL
TOILETING: TOTAL
HELP NEEDED FOR BATHING: TOTAL
SUGGESTED CMS G CODE MODIFIER MOBILITY: CM
STANDING UP FROM CHAIR USING ARMS: TOTAL
MOVING TO AND FROM BED TO CHAIR: UNABLE
HELP NEEDED FOR BATHING: TOTAL
DRESSING REGULAR LOWER BODY CLOTHING: TOTAL
SUGGESTED CMS G CODE MODIFIER DAILY ACTIVITY: CL
TURNING FROM BACK TO SIDE WHILE IN FLAT BAD: UNABLE
CLIMB 3 TO 5 STEPS WITH RAILING: TOTAL
WALKING IN HOSPITAL ROOM: TOTAL
MOVING TO AND FROM BED TO CHAIR: UNABLE
HELP NEEDED FOR BATHING: TOTAL
DRESSING REGULAR LOWER BODY CLOTHING: TOTAL
DAILY ACTIVITIY SCORE: 6
DRESSING REGULAR UPPER BODY CLOTHING: A LOT
PERSONAL GROOMING: TOTAL
DRESSING REGULAR LOWER BODY CLOTHING: TOTAL
MOVING FROM LYING ON BACK TO SITTING ON SIDE OF FLAT BED: UNABLE
MOBILITY SCORE: 6
HELP NEEDED FOR BATHING: TOTAL
EATING MEALS: TOTAL
TURNING FROM BACK TO SIDE WHILE IN FLAT BAD: UNABLE
DRESSING REGULAR UPPER BODY CLOTHING: TOTAL
PERSONAL GROOMING: TOTAL
WALKING IN HOSPITAL ROOM: TOTAL
MOVING FROM LYING ON BACK TO SITTING ON SIDE OF FLAT BED: UNABLE
EATING MEALS: TOTAL
SUGGESTED CMS G CODE MODIFIER DAILY ACTIVITY: CN
MOVING FROM LYING ON BACK TO SITTING ON SIDE OF FLAT BED: UNABLE
DRESSING REGULAR UPPER BODY CLOTHING: TOTAL
STANDING UP FROM CHAIR USING ARMS: A LOT
MOVING FROM LYING ON BACK TO SITTING ON SIDE OF FLAT BED: UNABLE
MOVING FROM LYING ON BACK TO SITTING ON SIDE OF FLAT BED: UNABLE
CLIMB 3 TO 5 STEPS WITH RAILING: TOTAL
MOBILITY SCORE: 6
CLIMB 3 TO 5 STEPS WITH RAILING: TOTAL
SUGGESTED CMS G CODE MODIFIER DAILY ACTIVITY: CN
SUGGESTED CMS G CODE MODIFIER DAILY ACTIVITY: CN
MOBILITY SCORE: 8
STANDING UP FROM CHAIR USING ARMS: TOTAL
PERSONAL GROOMING: TOTAL
TURNING FROM BACK TO SIDE WHILE IN FLAT BAD: UNABLE
TOILETING: A LOT
SUGGESTED CMS G CODE MODIFIER MOBILITY: CN
DAILY ACTIVITIY SCORE: 6
TOILETING: TOTAL
HELP NEEDED FOR BATHING: TOTAL
CLIMB 3 TO 5 STEPS WITH RAILING: TOTAL
MOBILITY SCORE: 6
CLIMB 3 TO 5 STEPS WITH RAILING: TOTAL
STANDING UP FROM CHAIR USING ARMS: TOTAL
SUGGESTED CMS G CODE MODIFIER MOBILITY: CM
EATING MEALS: TOTAL
SUGGESTED CMS G CODE MODIFIER MOBILITY: CN
SUGGESTED CMS G CODE MODIFIER DAILY ACTIVITY: CN
STANDING UP FROM CHAIR USING ARMS: TOTAL
EATING MEALS: TOTAL
TOILETING: TOTAL
MOBILITY SCORE: 9
DAILY ACTIVITIY SCORE: 11
WALKING IN HOSPITAL ROOM: TOTAL
TOILETING: TOTAL
SUGGESTED CMS G CODE MODIFIER DAILY ACTIVITY: CN
PERSONAL GROOMING: A LOT
STANDING UP FROM CHAIR USING ARMS: TOTAL
SUGGESTED CMS G CODE MODIFIER DAILY ACTIVITY: CL
TURNING FROM BACK TO SIDE WHILE IN FLAT BAD: UNABLE
SUGGESTED CMS G CODE MODIFIER MOBILITY: CN
DAILY ACTIVITIY SCORE: 6
PERSONAL GROOMING: TOTAL
PERSONAL GROOMING: TOTAL
DAILY ACTIVITIY SCORE: 6
CLIMB 3 TO 5 STEPS WITH RAILING: TOTAL
TURNING FROM BACK TO SIDE WHILE IN FLAT BAD: UNABLE
MOVING TO AND FROM BED TO CHAIR: UNABLE
DAILY ACTIVITIY SCORE: 6
TURNING FROM BACK TO SIDE WHILE IN FLAT BAD: A LITTLE
DAILY ACTIVITIY SCORE: 6
MOBILITY SCORE: 6
MOVING TO AND FROM BED TO CHAIR: UNABLE
DRESSING REGULAR LOWER BODY CLOTHING: TOTAL
MOVING TO AND FROM BED TO CHAIR: UNABLE
SUGGESTED CMS G CODE MODIFIER DAILY ACTIVITY: CN
SUGGESTED CMS G CODE MODIFIER MOBILITY: CN
PERSONAL GROOMING: TOTAL
TURNING FROM BACK TO SIDE WHILE IN FLAT BAD: UNABLE
TOILETING: TOTAL

## 2022-01-01 ASSESSMENT — ACTIVITIES OF DAILY LIVING (ADL)
BATHING_REQUIRES_ASSISTANCE: 1
AMBULATION ASSISTANCE: NON-AMBULATORY
CONTINENCE_REQUIRES_ASSISTANCE: 1
DRESSING_REQUIRES_ASSISTANCE: 1
EATING_REQUIRES_ASSISTANCE: 1
PHYSICAL_TRANSFER_REQUIRES_ASSISTANCE: 1
MONEY MANAGEMENT (EXPENSES/BILLS): TOTALLY DEPENDENT
BATHING_REQUIRES_ASSISTANCE: 1
AMBULATION ASSISTANCE: NON-AMBULATORY
DRESSING_REQUIRES_ASSISTANCE: 1
AMBULATION_REQUIRES_ASSISTANCE: 1
AMBULATION ASSISTANCE: NON-AMBULATORY
MONEY MANAGEMENT (EXPENSES/BILLS): TOTALLY DEPENDENT

## 2022-01-01 ASSESSMENT — PAIN DESCRIPTION - PAIN TYPE
TYPE: ACUTE PAIN
TYPE: ACUTE PAIN
TYPE: ACUTE PAIN;CHRONIC PAIN
TYPE: ACUTE PAIN
TYPE: ACUTE PAIN
TYPE: CHRONIC PAIN
TYPE: ACUTE PAIN

## 2022-01-01 ASSESSMENT — PAIN SCALES - PAIN ASSESSMENT IN ADVANCED DEMENTIA (PAINAD)
BODYLANGUAGE: 0 - RELAXED.
CONSOLABILITY: 0 - NO NEED TO CONSOLE.
CONSOLABILITY: 0 - NO NEED TO CONSOLE.
BODYLANGUAGE: 1 - TENSE. DISTRESSED PACING. FIDGETING.
TOTALSCORE: 0
FACIALEXPRESSION: 0 - SMILING OR INEXPRESSIVE.
TOTALSCORE: 2
TOTALSCORE: 0
CONSOLABILITY: 0 - NO NEED TO CONSOLE.
NEGVOCALIZATION: 0 - NONE.
FACIALEXPRESSION: 0 - SMILING OR INEXPRESSIVE.
TOTALSCORE: 0
FACIALEXPRESSION: 0 - SMILING OR INEXPRESSIVE.
FACIALEXPRESSION: 0 - SMILING OR INEXPRESSIVE.
CONSOLABILITY: 1 - DISTRACTED OR REASSURED BY VOICE OR TOUCH.
NEGVOCALIZATION: 0 - NONE.
TOTALSCORE: 3
BODYLANGUAGE: 0 - RELAXED.
TOTALSCORE: 0
BODYLANGUAGE: 1 - TENSE. DISTRESSED PACING. FIDGETING.
BODYLANGUAGE: 0 - RELAXED.
FACIALEXPRESSION: 1 - SAD. FRIGHTENED. FROWN.
NEGVOCALIZATION: 0 - NONE.
NEGVOCALIZATION: 0 - NONE.
BODYLANGUAGE: 1 - TENSE. DISTRESSED PACING. FIDGETING.
CONSOLABILITY: 0 - NO NEED TO CONSOLE.
TOTALSCORE: 3
BODYLANGUAGE: 0 - RELAXED.
CONSOLABILITY: 0 - NO NEED TO CONSOLE.
CONSOLABILITY: 0 - NO NEED TO CONSOLE.
NEGVOCALIZATION: 0 - NONE.
NEGVOCALIZATION: 0 - NONE.
CONSOLABILITY: 0 - NO NEED TO CONSOLE.
CONSOLABILITY: 0 - NO NEED TO CONSOLE.
FACIALEXPRESSION: 2 - FACIAL GRIMACING.
BODYLANGUAGE: 0 - RELAXED.
NEGVOCALIZATION: 1 - OCCASIONAL MOAN OR GROAN. LOW-LEVEL SPEECH WITH A NEGATIVE OR DISAPPROVING QUALITY.
NEGVOCALIZATION: 0 - NONE.
FACIALEXPRESSION: 0 - SMILING OR INEXPRESSIVE.
NEGVOCALIZATION: 0 - NONE.
TOTALSCORE: 3
TOTALSCORE: 0
BODYLANGUAGE: 1 - TENSE. DISTRESSED PACING. FIDGETING.
FACIALEXPRESSION: 0 - SMILING OR INEXPRESSIVE.
FACIALEXPRESSION: 1 - SAD. FRIGHTENED. FROWN.

## 2022-01-01 ASSESSMENT — PATIENT HEALTH QUESTIONNAIRE - PHQ9
SUM OF ALL RESPONSES TO PHQ9 QUESTIONS 1 AND 2: 0
SUM OF ALL RESPONSES TO PHQ9 QUESTIONS 1 AND 2: 0
2. FEELING DOWN, DEPRESSED, IRRITABLE, OR HOPELESS: NOT AT ALL
SUM OF ALL RESPONSES TO PHQ9 QUESTIONS 1 AND 2: 0
SUM OF ALL RESPONSES TO PHQ9 QUESTIONS 1 AND 2: 0
2. FEELING DOWN, DEPRESSED, IRRITABLE, OR HOPELESS: NOT AT ALL
2. FEELING DOWN, DEPRESSED, IRRITABLE, OR HOPELESS: NOT AT ALL
CLINICAL INTERPRETATION OF PHQ2 SCORE: 0
1. LITTLE INTEREST OR PLEASURE IN DOING THINGS: NOT AT ALL
2. FEELING DOWN, DEPRESSED, IRRITABLE, OR HOPELESS: NOT AT ALL

## 2022-01-01 ASSESSMENT — LIFESTYLE VARIABLES
ALCOHOL_USE: NO
HOW MANY TIMES IN THE PAST YEAR HAVE YOU HAD 5 OR MORE DRINKS IN A DAY: 0
EVER HAD A DRINK FIRST THING IN THE MORNING TO STEADY YOUR NERVES TO GET RID OF A HANGOVER: NO
HAVE YOU EVER FELT YOU SHOULD CUT DOWN ON YOUR DRINKING: NO
TOTAL SCORE: 0
AVERAGE NUMBER OF DAYS PER WEEK YOU HAVE A DRINK CONTAINING ALCOHOL: 0
EVER FELT BAD OR GUILTY ABOUT YOUR DRINKING: NO
TOTAL SCORE: 0
CONSUMPTION TOTAL: NEGATIVE
TOTAL SCORE: 0
ON A TYPICAL DAY WHEN YOU DRINK ALCOHOL HOW MANY DRINKS DO YOU HAVE: 0
HAVE PEOPLE ANNOYED YOU BY CRITICIZING YOUR DRINKING: NO

## 2022-01-01 ASSESSMENT — ENCOUNTER SYMPTOMS
MUSCULOSKELETAL NEGATIVE: 1
FATIGUES EASILY: 1
PAIN LOCATION - RELIEVING FACTORS: MEDICATION
PAIN LOCATION: GENERALIZED
NEUROLOGICAL NEGATIVE: 1
LAST BOWEL MOVEMENT: 66451
DYSPNEA ON EXERTION: 1
UNABLE TO COMMUNICATE PAIN: 1
DRY SKIN: 1
LAST BOWEL MOVEMENT: 66451
FATIGUES EASILY: 1
UNABLE TO COMMUNICATE PAIN: 1
PAIN: 1
UNABLE TO COMMUNICATE PAIN: 1
ABDOMINAL PAIN: 0
DYSPNEA ACTIVITY LEVEL: AT REST
UNABLE TO COMMUNICATE PAIN: 1
SHORTNESS OF BREATH: 0
MYALGIAS: 0
PAIN LOCATION - PAIN FREQUENCY: INTERMITTENT
RESPIRATORY NEGATIVE: 1
WEAKNESS: 1
HEADACHES: 0
STOOL FREQUENCY: LESS THAN DAILY
STOOL FREQUENCY: LESS THAN DAILY
BOWEL INCONTINENCE: 1
SHORTNESS OF BREATH: 0
SHORTNESS OF BREATH: 0
SHORTNESS OF BREATH: 1
SUBJECTIVE PAIN PROGRESSION: WAXING AND WANING
DYSPNEA ACTIVITY LEVEL: AT REST
PERSON REPORTING PAIN: PATIENT
SHORTNESS OF BREATH: 1
DYSPNEA ON EXERTION: 1
PAIN SEVERITY GOAL: 1/10
STOOL FREQUENCY: LESS THAN DAILY
DYSPNEA ACTIVITY LEVEL: AT REST
MUSCLE WEAKNESS: 1
GASTROINTESTINAL NEGATIVE: 1
ABDOMINAL PAIN: 0
PSYCHIATRIC NEGATIVE: 1
MUSCLE WEAKNESS: 1
DRY SKIN: 1
PERSON REPORTING PAIN: DIRECT OBSERVATION
PERSON REPORTING PAIN: DIRECT OBSERVATION
BOWEL INCONTINENCE: 1
NECK PAIN: 0
DYSPNEA ACTIVITY LEVEL: AT REST
EYES NEGATIVE: 1
HEADACHES: 0
CONSTITUTIONAL NEGATIVE: 1
PERSON REPORTING PAIN: DIRECT OBSERVATION
SHORTNESS OF BREATH: 1
SHORTNESS OF BREATH: 1
FATIGUES EASILY: 1
MUSCLE WEAKNESS: 1
LAST BOWEL MOVEMENT: 66451
PERSON REPORTING PAIN: DIRECT OBSERVATION
PAIN LOCATION: RIGHT LOWER CHEST
DYSPNEA ON EXERTION: 1
DRY SKIN: 1
CARDIOVASCULAR NEGATIVE: 1
UNABLE TO COMMUNICATE PAIN: 1
BOWEL INCONTINENCE: 1
UNABLE TO COMMUNICATE PAIN: 1
LOWEST PAIN SEVERITY IN PAST 24 HOURS: 1/10
HIGHEST PAIN SEVERITY IN PAST 24 HOURS: 3/10
UNABLE TO COMMUNICATE PAIN: 1

## 2022-01-01 ASSESSMENT — FIBROSIS 4 INDEX
FIB4 SCORE: 0.68
FIB4 SCORE: 0.7
FIB4 SCORE: 0.92
FIB4 SCORE: 0.92

## 2022-01-01 ASSESSMENT — COPD QUESTIONNAIRES
DURING THE PAST 4 WEEKS HOW MUCH DID YOU FEEL SHORT OF BREATH: NONE/LITTLE OF THE TIME
COPD SCREENING SCORE: 1
DO YOU EVER COUGH UP ANY MUCUS OR PHLEGM?: NO/ONLY WITH OCCASIONAL COLDS OR INFECTIONS
HAVE YOU SMOKED AT LEAST 100 CIGARETTES IN YOUR ENTIRE LIFE: NO/DON'T KNOW

## 2022-01-01 ASSESSMENT — GAIT ASSESSMENTS
GAIT LEVEL OF ASSIST: MODERATE ASSIST
GAIT LEVEL OF ASSIST: MODERATE ASSIST
GAIT LEVEL OF ASSIST: UNABLE TO PARTICIPATE
ASSISTIVE DEVICE: HAND HELD ASSIST
GAIT LEVEL OF ASSIST: UNABLE TO PARTICIPATE
GAIT LEVEL OF ASSIST: UNABLE TO PARTICIPATE
DEVIATION: SHUFFLED GAIT;BRADYKINETIC;ATAXIC
ASSISTIVE DEVICE: NONE;HAND HELD ASSIST
GAIT LEVEL OF ASSIST: UNABLE TO PARTICIPATE
DISTANCE (FEET): 3
DISTANCE (FEET): 4

## 2022-11-02 PROBLEM — E11.9 TYPE 2 DIABETES MELLITUS (HCC): Status: ACTIVE | Noted: 2022-01-01

## 2022-11-02 PROBLEM — H54.7 VISION LOSS: Status: ACTIVE | Noted: 2022-01-01

## 2022-11-02 PROBLEM — E78.5 HLD (HYPERLIPIDEMIA): Status: ACTIVE | Noted: 2022-01-01

## 2022-11-02 PROBLEM — I63.9 ACUTE CVA (CEREBROVASCULAR ACCIDENT) (HCC): Status: ACTIVE | Noted: 2022-01-01

## 2022-11-02 NOTE — ASSESSMENT & PLAN NOTE
MRI noted with left frontal insular temporal infarct  Evaluated by neurology with recommendation for Zio patch on discharge  PT/OT recommending postacute placement no accepting SNF discussed with case management who will follow up with family about applying for Medicaid   Family financial / medical guardianship in progress  Transitioned to comfort care per communicated preference to focus only on remaining comfort

## 2022-11-02 NOTE — CONSULTS
"Neurology STROKE CODE H&P  Neurohospitalist Service, [facility]    Referring Physician: SARAH Tanner*    STROKE CODE:   Chief Complaint   Patient presents with    Altered Mental Status     Pt found by family this AM with difficulty speaking, pt went to the bathroom and was later found by family in the bathroom with altered mental status not following commands, no signs of trauma, no signs of substance or ETOH on scene per EMS. Hx DM, blood sugar 215       To obtain the most accurate data regarding the time called, and time patient seen, refer to the stroke run-sheet and chart.  For time of CT, refer to the radiology report. See A&P below for TPA Decision and door to needle time if and when applicable.    HPI: Unknown female brought in as a stroke alert.  There is no past medical history in the chart.  Patient is unable divide medical history.  There is no family at bedside.  Per EMS patient was last seen normal at 9 AM today.  Patient is completely mute.  She has a slight left gaze preference.      Review of systems: In addition to what is detailed in the HPI above, (and scanned into the chart if and when applicable), all other systems reviewed and are negative.    Past Medical History:    has a past medical history of Diabetes (HCC).    FHx:  Unknown  SHx:       Allergies:  No Known Allergies    Medications:  No current facility-administered medications for this encounter.  No current outpatient medications on file.    Physical Examination:    Vitals:    11/02/22 1431 11/02/22 1434   BP: (!) 136/90    Pulse: (!) 103    Resp: 14    Temp: 36.7 °C (98.1 °F)    TempSrc: Temporal    SpO2: 94%    Weight:  54.4 kg (120 lb)   Height:  1.473 m (4' 10\")       General: Patient is awake but not following   eyes: Unremarkable   CV: RRR    NEUROLOGICAL EXAM:     Mental status: Patient is awake.  She does spontaneously open her eyes.  Has a left gaze preference.  But not following any commands. "    Speech and language: Mute not following   cranial nerve exam: Pupils are equal reactive.  There is a left gaze preference.  Possible right facial droop.  Decreased reaction to noxious stimuli in both sides of face.  Does not follow for tongue thrusting or shoulder shrug.  Appears blinks to threat on both sides.    Motor exam: Minimal strength in all 4.  Minimal reacts to noxious stimuli in all 4.  Deep tendon reflexes: Toes are equivocal.  Coordination: no ataxia   Gait: deferred altered mental status    NIH Stroke Scale:    1a. Level of Consciousness (Alert, drowsy, etc): 1= Drowsy    1b. LOC Questions (Month, age): 2= Incorrect    1c. LOC Commands (Open/close eyes make fist/let go): 2= Incorrect    2.   Best Gaze (Eyes open - patient follows examiner's finger on face): 1= Partial gaze palay    3.   Visual Fields (introduce visual stimulus/threat to patient's field quadrants): 0= No visual loss  4.   Facial Paresis (Show teeth, raise eyebrows and squeeze eyes shut): 1= Minor     5a. Motor Arm - Left (Elevate arm to 90 degrees if patient is sitting, 45 degrees if  supine): 3= No effort against gravity    5b. Motor Arm - Right (Elevate arm to 90 degrees if patient is sitting, 45 degrees if supine): 3= No effort against gravity    6a. Motor Leg - Left (Elevate leg 30 degrees with patient supine): 3= No effort against gravity    6b. Motor Leg - Right  (Elevate leg 30 degrees with patient supine): 3= No effort against gravity    7.   Limb Ataxia (Finger-nose, heel down shin): 0= No ataxia    8.   Sensory (Pin prick to face, arm, trunk and leg - compare side to side): 1= Partial loss    9.  Best Language (Name item, describe a picture and read sentences): 3= Mute    10. Dysarthria (Evaluate speech clarity by patient repeating listed words): 2= Near to unintelligible or worse    11. Extinction and Inattention (Use information from prior testing to identify neglect or  double simultaneous stimuli testing): 0= No  neglect    Total NIH Score: 25    Modified [place] Scale (MRS): 0 = No symptoms      Objective Data:    Labs:  Lab Results   Component Value Date/Time    PROTHROMBTM 13.5 11/02/2022 02:09 PM    INR 1.04 11/02/2022 02:09 PM      Lab Results   Component Value Date/Time    WBC 8.8 11/02/2022 02:09 PM    RBC 4.50 11/02/2022 02:09 PM    HEMOGLOBIN 13.0 11/02/2022 02:09 PM    HEMATOCRIT 40.6 11/02/2022 02:09 PM    MCV 90.2 11/02/2022 02:09 PM    MCH 28.9 11/02/2022 02:09 PM    MCHC 32.0 (L) 11/02/2022 02:09 PM    MPV 9.0 11/02/2022 02:09 PM    NEUTSPOLYS 74.20 (H) 11/02/2022 02:09 PM    LYMPHOCYTES 19.70 (L) 11/02/2022 02:09 PM    MONOCYTES 4.80 11/02/2022 02:09 PM    EOSINOPHILS 0.20 11/02/2022 02:09 PM    BASOPHILS 1.00 11/02/2022 02:09 PM      Lab Results   Component Value Date/Time    SODIUM 138 11/02/2022 02:09 PM    POTASSIUM 4.8 11/02/2022 02:09 PM    CHLORIDE 102 11/02/2022 02:09 PM    CO2 22 11/02/2022 02:09 PM    GLUCOSE 215 (H) 11/02/2022 02:09 PM    BUN 36 (H) 11/02/2022 02:09 PM    CREATININE 0.81 11/02/2022 02:09 PM      No results found for: CHOLSTRLTOT, LDL, HDL, TRIGLYCERIDE    Lab Results   Component Value Date/Time    ALKPHOSPHAT 82 11/02/2022 02:09 PM    ASTSGOT 24 11/02/2022 02:09 PM    ALTSGPT 18 11/02/2022 02:09 PM    TBILIRUBIN 0.5 11/02/2022 02:09 PM        Imaging/Testing:  DX-CHEST-PORTABLE (1 VIEW)   Final Result      No acute cardiac or pulmonary abnormalities are identified.      CT-CTA NECK WITH & W/O-POST PROCESSING   Final Result      1.  Occluded LEFT extracranial internal carotid artery. This could be due to thromboembolic disease or dissection.   2.  Estimated 50-75% stenosis of the proximal RIGHT internal carotid artery   3.  Thyroid nodules      CT-CEREBRAL PERFUSION ANALYSIS   Final Result      1.  Moderate sized completed area of infarction noted in the left frontal parasylvian region.      CT-CTA HEAD WITH & W/O-POST PROCESS   Final Result      1.  Occlusion of the imaged  extracranial LEFT internal carotid artery through the cavernous segment with reconstitution of the supraclinoid segment   2.  LEFT M3 branch occlusion            Findings were communicated with and acknowledged by Dr. Sultana via Vo[employer] Me on 11/2/2022 2:34 PM.      CT-HEAD W/O   Final Result      1.  Moderate sized acute area of infarction involving the left frontal parasylvian region.      2.  Smaller encephalomalacia thinning to the cortex in the right parietal-occipital region.      3.  Mild age-related cerebral atrophy.               Assessment and Plan:    56-year-old female brought in as a stroke alert this morning for altered mental status.  Found to have a large left frontal parietal infarct.  Unfortunately this is completed based on the perfusion scans.  No intervention indicated at this time.  There is an occlusion of the left carotid most likely the etiology of the infarct.  There is reconstituted flow in the left MCA from the anterior communicating.  Etiology of the occlusion is unknown however I suspect this is arthrosclerotic/thromboembolic phenomenon given amount of athero-disease the patient has throughout her vascular system.  We cannot reopen this at this time as the stroke has completed and there would be a very high risk of reperfusion hemorrhage with little benefit.  For now we need to treat conservatively with supportive therapy.    Plan:  1.  Okay with aspirin 81 mg daily  2.  TTE with bubble  3.  PT/OT and speech  4.  Telemonitoring  5.  Check an A1c maintain normoglycemia  6.  Lipid panel treat LDL for goal below 70  7.  MRI brain  8.  Blood pressures running 130 systolic.  Okay to maintain this levels for now.  No need to promote permissive hypertension.  Also recommend not lowering too much.  Maintain 110s to 140s systolic.        The evaluation of the patient, and recommended management, was discussed with the resident staff.     This chart was partially generated using voice  recognition technology and sound alike word replacement may be present, best efforts were made to make the chart accurate.    Gio Garay MD  Board Certified Neurology, ABPN  t) [phone]

## 2022-11-02 NOTE — H&P
Hospital Medicine History & Physical Note    Date of Service  11/2/2022    Primary Care Physician  No primary care provider on file.    Consultants  neurology    Specialist Names: Dr Bill    Code Status  No Order    Chief Complaint  Chief Complaint   Patient presents with    Altered Mental Status     Pt found by family this AM with difficulty speaking, pt went to the bathroom and was later found by family in the bathroom with altered mental status not following commands, no signs of trauma, no signs of substance or ETOH on scene per EMS. Hx DM, blood sugar 215       History of Presenting Illness  Jacqueline Webb is a 56 y.o. female who presented 11/2/2022 with a history of type 2 diabetes, high cholesterol, vision loss who presented with altered mental status.  Information obtained from the chart and from the patient's family.  Patient's sister and brother present at bedside.  Family ports exact time of the stroke is not entirely known.  They report that she was last known normal approximately 8 AM this morning and when the patient's brother want to see the patient at approximately 9 AM she was sitting on the couch and motioning that something was wrong.  The brother reports that she appeared extremely confused and became increasingly unresponsive.  At first he felt this may be secondary to her blood sugar since she has had issues with her diabetes in the past.  They felt that the patient needed to rest however they noted that the patient has not shown any improvements and henceforth the patient was brought to the hospital to be evaluated.  Family report the patient has never had a stroke in the past.  They report she has been admitted in the past for diabetic ketoacidosis.  In the ED patient was found to have a left-sided gaze preference, NIH 25    I discussed the plan of care with family and bedside RN.    Review of Systems  Review of Systems   Unable to perform ROS: Medical condition     Past Medical  History  Type 2 diabetes, hyperlipidemia, vision loss, breast cancer    Surgical History  Surgery for breast cancer    Family History  Mother had a history of high cholesterol and TIAs.  Sister reports that she and another family member have a history of high cholesterol.  Family history reviewed with patient. There is family history that is pertinent to the chief complaint.     Social History  Family ports the patient does not drink, smoke or use recreational drugs    Allergies  No Known Allergies    Medications  None       Physical Exam  Temp:  [36.7 °C (98.1 °F)] 36.7 °C (98.1 °F)  Pulse:  [] 96  Resp:  [13-14] 13  BP: (136)/(62-90) 136/62  SpO2:  [92 %-94 %] 92 %  Blood Pressure: 136/62   Temperature: 36.7 °C (98.1 °F)   Pulse: 96   Respiration: 13   Pulse Oximetry: 92 %       Physical Exam  Vitals and nursing note reviewed.   Constitutional:       General: She is not in acute distress.     Appearance: She is not ill-appearing.   HENT:      Head: Normocephalic and atraumatic.   Eyes:      General: No scleral icterus.        Right eye: No discharge.         Left eye: No discharge.      Comments: Left sided gaze preference   Cardiovascular:      Rate and Rhythm: Normal rate and regular rhythm.      Heart sounds: Normal heart sounds.   Pulmonary:      Effort: No respiratory distress.      Breath sounds: Normal breath sounds. No wheezing.   Abdominal:      General: There is no distension.      Tenderness: There is no abdominal tenderness. There is no guarding.   Musculoskeletal:      Right lower leg: No edema.      Left lower leg: No edema.   Skin:     General: Skin is warm and dry.      Coloration: Skin is not jaundiced.   Neurological:      Mental Status: She is lethargic.      GCS: GCS verbal subscore is 1.       Laboratory:  Recent Labs     11/02/22  1409   WBC 8.8   RBC 4.50   HEMOGLOBIN 13.0   HEMATOCRIT 40.6   MCV 90.2   MCH 28.9   MCHC 32.0*   RDW 40.6   PLATELETCT 326   MPV 9.0     Recent Labs      11/02/22  1409   SODIUM 138   POTASSIUM 4.8   CHLORIDE 102   CO2 22   GLUCOSE 215*   BUN 36*   CREATININE 0.81   CALCIUM 9.6     Recent Labs     11/02/22  1409   ALTSGPT 18   ASTSGOT 24   ALKPHOSPHAT 82   TBILIRUBIN 0.5   GLUCOSE 215*     Recent Labs     11/02/22  1409   APTT 23.4*   INR 1.04     No results for input(s): NTPROBNP in the last 72 hours.      Recent Labs     11/02/22  1409   TROPONINT 14       Imaging:  DX-CHEST-PORTABLE (1 VIEW)   Final Result      No acute cardiac or pulmonary abnormalities are identified.      CT-CTA NECK WITH & W/O-POST PROCESSING   Final Result      1.  Occluded LEFT extracranial internal carotid artery. This could be due to thromboembolic disease or dissection.   2.  Estimated 50-75% stenosis of the proximal RIGHT internal carotid artery   3.  Thyroid nodules      CT-CEREBRAL PERFUSION ANALYSIS   Final Result      1.  Moderate sized completed area of infarction noted in the left frontal parasylvian region.      CT-CTA HEAD WITH & W/O-POST PROCESS   Final Result      1.  Occlusion of the imaged extracranial LEFT internal carotid artery through the cavernous segment with reconstitution of the supraclinoid segment   2.  LEFT M3 branch occlusion            Findings were communicated with and acknowledged by Dr. Sultana via Voalte Me on 11/2/2022 2:34 PM.      CT-HEAD W/O   Final Result      1.  Moderate sized acute area of infarction involving the left frontal parasylvian region.      2.  Smaller encephalomalacia thinning to the cortex in the right parietal-occipital region.      3.  Mild age-related cerebral atrophy.         EC-ECHOCARDIOGRAM COMPLETE W/O CONT    (Results Pending)           Assessment/Plan:  Justification for Admission Status  I anticipate this patient will require at least two midnights for appropriate medical management, necessitating inpatient admission because acute stroke    Patient will need a Telemetry bed on NEUROLOGY service .  The need is secondary to  acute stroke.    * Acute CVA (cerebrovascular accident) (HCC)- (present on admission)  Assessment & Plan  Left internal carotid artery occlusion  Left M3 segment occlusion  Admit to neurology  Neurology following  Monitor on telemetry  Lipid panel and A1c ordered  PT/OT, SLP ordered    Vision loss  Assessment & Plan  Family report that the patient has a history of vision loss over the last few months.  They are not sure why she has been losing her vision.  She does have a history of uncontrolled diabetes.  Patient is now presenting with left-sided gaze secondary to her stroke.    HLD (hyperlipidemia)  Assessment & Plan  Family report the patient has a history of high cholesterol.  Lipid panel ordered.    Type 2 diabetes mellitus (HCC)  Assessment & Plan  Family report the patient has a history of type 2 diabetes and has been admitted for DKA in the past.  Insulin sliding scale  A1c  Glucose checks every 6 hours      VTE prophylaxis: SCDs/TEDs

## 2022-11-02 NOTE — DISCHARGE PLANNING
JAJA asked to assist with locating family as Pt in non-veral at this time. JAJA reviewed Pt chart and found that it was not merged so [facility] was called and JAJA was able to obtain the reporting parties contact information.  SW call the number and was able to get a hold of Pt's Sister, Karuna who informed this SW that she was in the lobby waiting.  SW went to the lobby and escorted her and the Pt's Brother Gilberto back to the Pt's room.  ERP was updated.     Sister: Karuna [facility] [phone]

## 2022-11-02 NOTE — DISCHARGE PLANNING
[facility] Transitional Care Coordination    Referral from: Dr Olivia  Insurance Provider on Facesheet: Uninsured - please have PFA verify coverage.  Potential Rehab Diagnosis: Stroke    Chart review indicates patient may have on going medical management and may have therapy needs to possibly meet inpatient rehab facility criteria with the goal of returning to community.    D/C support: TBD     Physiatry consultation pended per protocol.     Stroke, NIH 25 - pending work up and therapy evals as appropriate. Physiatry consult pended, TCC will follow.     Thank you for the referral.

## 2022-11-02 NOTE — ED NOTES
Pharmacy Medication Reconciliation    ~Unable to complete Medication Reconciliation at this time. Patient is not able to participate in interview.   ~No pharmacy on file and no family at bedside.

## 2022-11-02 NOTE — ED TRIAGE NOTES
"Chief Complaint   Patient presents with    Altered Mental Status     Pt found by family this AM with difficulty speaking, pt went to the bathroom and was later found by family in the bathroom with altered mental status not following commands, no signs of trauma, no signs of substance or ETOH on scene per EMS. Hx DM, blood sugar 215     Pt BIB EMS for above complaints, VSS on RA, NAD.    Pt started at charge desk as stroke IR.     BP (!) 136/90   Pulse (!) 103   Temp 36.7 °C (98.1 °F) (Temporal)   Resp 14   Ht 1.473 m (4' 10\")   Wt 54.4 kg (120 lb)   SpO2 94%   BMI 25.08 kg/m²     "

## 2022-11-02 NOTE — ED PROVIDER NOTES
ED Provider Note    CHIEF COMPLAINT  Chief Complaint   Patient presents with    Altered Mental Status     Pt found by family this AM with difficulty speaking, pt went to the bathroom and was later found by family in the bathroom with altered mental status not following commands, no signs of trauma, no signs of substance or ETOH on scene per EMS. Hx DM, blood sugar 215         HPI  Jacqueline Quinteros is a 56 y.o. female who presents to the emergency department altered mental status.  The patient was found to have altered mental status this morning with difficulty speaking, slurred speech.  Per the patient's family, the patient was seen active probably yesterday afternoon when she was talking to the sister.  She did lie down to sleep last night and they believe she was acting appropriate except for little confusion.  The patient was found this morning approxi-9 AM at home sitting on the couch and per the brother she was extremely confused and increased in severity to the point she became unresponsive.  EMS arrived, she was unresponsive she had a leftward gaze deviation and right-sided neglect and a normal blood sugar.  She does have a history of an overdose several years ago but per family she has been acting appropriately recently.  REVIEW OF SYSTEMS  Unable to assess secondary to altered mental status.  PAST MEDICAL HISTORY  Past Medical History:   Diagnosis Date    Diabetes (HCC)        FAMILY HISTORY  Noncontributory    SOCIAL HISTORY  Social History     Socioeconomic History    Marital status: Single       SURGICAL HISTORY  History reviewed. No pertinent surgical history.    CURRENT MEDICATIONS  Home Medications       Reviewed by Leslie Ricardo (Pharmacy Tech) on 11/02/22 at 1512  Med List Status: Unable to Obtain     Medication Last Dose Status        Patient Aurelio Taking any Medications                           ALLERGIES  No Known Allergies    PHYSICAL EXAM  VITAL SIGNS: /59   Pulse (!) 104   Temp 36.7  "°C (98.1 °F) (Temporal)   Resp 14   Ht 1.473 m (4' 10\")   Wt 54.4 kg (120 lb)   SpO2 89%   BMI 25.08 kg/m²      Constitutional: Well developed, Well nourished, No acute distress, Non-toxic appearance.   Eyes: PERRLA, EOMI, Conjunctiva normal, No discharge.   Cardiovascular: Normal heart rate, Normal rhythm, No murmurs, No rubs, No gallops, and intact distal pulses.   Thorax & Lungs:  No respiratory distress, no rales, no rhonchi, No wheezing, No chest wall tenderness.   Abdomen: Bowel sounds normal, Soft, No tenderness, No guarding, No rebound, No pulsatile masses.   Skin: Warm, Dry, No erythema, No rash.   Extremities: Full range of motion, no deformity, no edema.  Neurologic: Aphasic, slight response to painful stimuli jaw thrust with moving her arms and legs, right-sided visual neglect with left-sided visual preference, no response to visual stimulus, no facial droop, aphasic, negative finger-nose bilaterally, assess for dysarthria, NIH of 37  Psychiatric: Unable to assess.      LABORATORY/ECG  Results for orders placed or performed during the hospital encounter of 11/02/22   CBC WITH DIFFERENTIAL   Result Value Ref Range    WBC 8.8 4.8 - 10.8 K/uL    RBC 4.50 4.20 - 5.40 M/uL    Hemoglobin 13.0 12.0 - 16.0 g/dL    Hematocrit 40.6 37.0 - 47.0 %    MCV 90.2 81.4 - 97.8 fL    MCH 28.9 27.0 - 33.0 pg    MCHC 32.0 (L) 33.6 - 35.0 g/dL    RDW 40.6 35.9 - 50.0 fL    Platelet Count 326 164 - 446 K/uL    MPV 9.0 9.0 - 12.9 fL    Neutrophils-Polys 74.20 (H) 44.00 - 72.00 %    Lymphocytes 19.70 (L) 22.00 - 41.00 %    Monocytes 4.80 0.00 - 13.40 %    Eosinophils 0.20 0.00 - 6.90 %    Basophils 1.00 0.00 - 1.80 %    Immature Granulocytes 0.10 0.00 - 0.90 %    Nucleated RBC 0.00 /100 WBC    Neutrophils (Absolute) 6.54 2.00 - 7.15 K/uL    Lymphs (Absolute) 1.74 1.00 - 4.80 K/uL    Monos (Absolute) 0.42 0.00 - 0.85 K/uL    Eos (Absolute) 0.02 0.00 - 0.51 K/uL    Baso (Absolute) 0.09 0.00 - 0.12 K/uL    Immature " Granulocytes (abs) 0.01 0.00 - 0.11 K/uL    NRBC (Absolute) 0.00 K/uL   COMP METABOLIC PANEL   Result Value Ref Range    Sodium 138 135 - 145 mmol/L    Potassium 4.8 3.6 - 5.5 mmol/L    Chloride 102 96 - 112 mmol/L    Co2 22 20 - 33 mmol/L    Anion Gap 14.0 7.0 - 16.0    Glucose 215 (H) 65 - 99 mg/dL    Bun 36 (H) 8 - 22 mg/dL    Creatinine 0.81 0.50 - 1.40 mg/dL    Calcium 9.6 8.5 - 10.5 mg/dL    AST(SGOT) 24 12 - 45 U/L    ALT(SGPT) 18 2 - 50 U/L    Alkaline Phosphatase 82 30 - 99 U/L    Total Bilirubin 0.5 0.1 - 1.5 mg/dL    Albumin 4.0 3.2 - 4.9 g/dL    Total Protein 6.8 6.0 - 8.2 g/dL    Globulin 2.8 1.9 - 3.5 g/dL    A-G Ratio 1.4 g/dL   PROTHROMBIN TIME   Result Value Ref Range    PT 13.5 12.0 - 14.6 sec    INR 1.04 0.87 - 1.13   APTT   Result Value Ref Range    APTT 23.4 (L) 24.7 - 36.0 sec   COD (ADULT)   Result Value Ref Range    ABO Grouping Only A     Rh Grouping Only POS     Antibody Screen-Cod NEG    TROPONIN   Result Value Ref Range    Troponin T 14 6 - 19 ng/L   ESTIMATED GFR   Result Value Ref Range    GFR (CKD-EPI) 84 >60 mL/min/1.73 m 2   EKG (NOW)   Result Value Ref Range    Report       [facility]    Test Date:  2022  Pt Name:    KIERSTEN VEGA                 Department: ER  MRN:        [id number]                      Room:       RD 09  Gender:     Female                       Technician: [id number]  :        1965                   Requested By:PETRA SCOTT  Order #:    [id number]                    Reading MD: PETRA SCOTT, DO    Measurements  Intervals                                Axis  Rate:       99                           P:          82  DC:         149                          QRS:        72  QRSD:       85                           T:          90  QT:         364  QTc:        468    Interpretive Statements  Sinus rhythm  Prominent P waves, nondiagnostic  Nonspecific T abnormalities, lateral leads  No previous ECG available for  comparison  Electronically Signed On 11-2-2022 15:16:49 PDT by PETRA SCOTT DO     POCT glucose device results   Result Value Ref Range    POC Glucose, Blood 167 (H) 65 - 99 mg/dL         RADIOLOGY/PROCEDURES  DX-CHEST-PORTABLE (1 VIEW)   Final Result      No acute cardiac or pulmonary abnormalities are identified.      CT-CTA NECK WITH & W/O-POST PROCESSING   Final Result      1.  Occluded LEFT extracranial internal carotid artery. This could be due to thromboembolic disease or dissection.   2.  Estimated 50-75% stenosis of the proximal RIGHT internal carotid artery   3.  Thyroid nodules      CT-CEREBRAL PERFUSION ANALYSIS   Final Result      1.  Moderate sized completed area of infarction noted in the left frontal parasylvian region.      CT-CTA HEAD WITH & W/O-POST PROCESS   Final Result      1.  Occlusion of the imaged extracranial LEFT internal carotid artery through the cavernous segment with reconstitution of the supraclinoid segment   2.  LEFT M3 branch occlusion            Findings were communicated with and acknowledged by Dr. Sultana via [employer] on 11/2/2022 2:34 PM.      CT-HEAD W/O   Final Result      1.  Moderate sized acute area of infarction involving the left frontal parasylvian region.      2.  Smaller encephalomalacia thinning to the cortex in the right parietal-occipital region.      3.  Mild age-related cerebral atrophy.         EC-ECHOCARDIOGRAM COMPLETE W/O CONT    (Results Pending)         COURSE & MEDICAL DECISION MAKING  Pertinent Labs & Imaging studies reviewed. (See chart for details)  This is a [facility] 56-year-old female presents with altered mental status.  Is unsure of the onset of symptoms but currently according to EMS onset was at 9 AM.  She is not a candidate for TNKase secondary to timing and global symptoms with NIH score of 37 as well as a completed CVA on CT and CT perfusion.  She has evidence of a left extracranial internal carotid artery dissection versus  thrombolic event.  The patient does have a left M3 segment occlusion yet with a completed CVA on perfusion scan as well as CT head and neck, the patient is not a candidate for intra-arterial intervention.  I discussed the patient with Dr. Gilbert our neurologist here who agrees that the patient is not a candidate for intervention.  As for the dissection versus thrombotic event, there is complete occlusion there is no intervention needed.  The patient does have evidence of a very poor outcome secondary to her very large infract is completed here.  I am trying to find family for more information and discussion about CODE STATUS.  I will discussed the patient with the hospitalist for hospitalization, stroke management and evaluation.  I discussed the patient with Dr. Olivia for hospitalization of this patient.    CRITICAL CARE  The very real possibilty of a deterioration of this patient's condition required the highest level of my preparedness for sudden, emergent intervention.  I provided critical care services, which included medication orders, frequent reevaluations of the patient's condition and response to treatment, ordering and reviewing test results, and discussing the case with various consultants.  The critical care time associated with the care of the patient was 35 minutes. Review chart for interventions. This time is exclusive of any other billable procedures.         FINAL IMPRESSION  Cerebral vascular accident  Altered mental status  Left internal carotid artery occlusion  Left M3 segment occlusion  Critical care time 35 minutes         Electronically signed by: Gasper Solis D.O., 11/2/2022 2:28 PM

## 2022-11-02 NOTE — DISCHARGE PLANNING
CODE STROKE      Referral: CODE STROKE    Intervention: SW responded to CODE STROKE. Pt was BIB [facility] Department after found non responsive. Pt was non responsive upon arrival. Pts name is Jacqueline Quinteros (: 1965.). SW obtained the following pt information: Per EMS patient was picked up at home. Family was also present; however, they were unable to get a family member's name or phone number. EMS stated that the family is aware that the patient has been taken to [facility].    The patient was picked up from [address] [place] [address].    Plan: SW will assist as necessary.

## 2022-11-03 NOTE — PROGRESS NOTES
Pt transferred via gurney. Assisted to bed wit slide board. Tele monitor placed. EEG at bedside. Bed alarm and fall precautoins in place. Updated pt on plan of care. Pt nonverbal .

## 2022-11-03 NOTE — PROCEDURES
INPATIENT ROUTINE VIDEO ELECTROENCEPHALOGRAM REPORT      REFERRING PROVIDER: Dr. Garay    DOS: 11/3/2022     TOTAL RECORDING TIME: 0 hours and 24 minutes of total recording time    INDICATION:  Jacqueline Webb 56 y.o. female presenting with altered mental status    CURRENT ANTI-SEIZURE MEDICATIONS:   None    TECHNIQUE: Routine VEEG was set up by a Neurodiagnostic technologist who performed education to the patient and staff. A minimum of 23 electrodes and 23 channel recording was setup and performed by Neurodiagnostic technologist, in accordance with the international 10-20 system. The study was reviewed in bipolar and referential montages. The recording examined the patient in the  awake and drowsy state(s).     DESCRIPTION OF THE RECORD:  The background was continuous, asymmetrical, and showed a 9 Hz posterior dominant rhythm largely visible over the right hemisphere. The background was composed of a mixture of fast and slow frequencies . Reactivity was present. Spontaneous variability was present. State changes were present.   EEG Sleep: N2 sleep architecture was not seen.      ACTIVATION PROCEDURES:   Intermittent Photic stimulation was performed in a stepwise fashion from 1 to 30 Hz and did not elicited any abnormalities on EEG.     ICTAL AND INTERICTAL FINDINGS:   Continuous left hemispheric theta/delta slowing and lateralized epileptiform discharges, at times rhythmic and fluctuating between 2-3 Hz. No definitive clinical correlate on video. While this is not definitively a seizure, this pattern is highly epileptiform and remain on the ictal-interictal continuum.     No definite electrographic or electroclinical seizures.     EKG: Sampling of the EKG recording did not demonstrate any abnormalities      EVENTS:  None    INTERPRETATION:   Abnormal video EEG recording in the awake and drowsy state(s):  - Continuous left hemispheric theta/delta slowing and lateralized epileptiform discharges, at times rhythmic  and fluctuating between 2-3 Hz. No definitive clinical correlate on video. While this pattern is not definitively ictal, it has high epileptogenic potential and lies on the ictal-interictal continuum. Clinical and radiographic correlation recommended.             Landen Navarro MD  Department of Neurology at [facility]  General Neurologist and Epileptologist  Director of [facility]'s Level III Comprehensive Epilepsy Program  Professor of Clinical Neurology, [facility].   Phone: [phone]  Fax: [phone]  E-mail: [email or web address]

## 2022-11-03 NOTE — THERAPY
Speech Language Pathology   Clinical Swallow Evaluation     Patient Name: Jacqueline Webb  AGE:  56 y.o., SEX:  female  Medical Record #: [id number]  Today's Date: 11/3/2022          Assessment    HPI: 56 y.o. female who presented 11/2/2022 with altered mental status PMHx: type 2 diabetes, high cholesterol, vision loss, breast cancer CMHx: Acute CVA, HLD. No SLP hx at [facility]    CXR 11/2:  No acute cardiac or pulmonary abnormalities are identified.    CT-Head 11/2:  1.  Moderate sized acute area of infarction involving the left frontal parasylvian region. 2.  Smaller encephalomalacia thinning to the cortex in the right parietal-occipital region. 3.  Mild age-related cerebral atrophy.       Level of Consciousness: Lethargic  Affect/Behavior: Calm  Follows Directives: Inconsistent  Orientation: Self  Hearing: Functional hearing  Vision:  Vision loss      Prior Living Situation & Level of Function:  Per EMR pt lives with family. Dysphagia hx unknown.      Oral Mechanism Evaluation  Facial Symmetry: Slight R-facial droop  Facial Sensation: Pt did not follow commands to assess  Lingual Observations: Midline  Dentition: Fair  Comments:      Voice  Quality: Pt did not follow commands to assess  Resonance: Pt did not follow commands to assess  Intensity: Pt did not follow commands to assess  Cough: Perceptually weak  Comments:      Current Method of Nutrition   NPO until cleared by speech pathology      Subjective  Patient unable to follow commands. Pt cleared by RN for evaluation.       Assessment  Positioning: Santos's (60-90 degrees)  Bolus Administration: SLP  Oxygen Requirements: Room Air  Factor(s) Affecting Performance: Impaired endurance, Impaired mental status, Impaired command following    Swallowing Trials  Ice: Impaired  Thin Liquid (TN0): Impaired    Comments:  Patient with leftward gaze deviation and right-sided neglect. Pt spontaneously opens eyes and attempts to vocalize. Reduced bolus containment as seen by trace  amount of bolus loss to right with all PO trials. Onset of swallow trigger was mildly delayed. Immediate weak persistent coughing with PO of thin liquids via tsp in 2/3 trials. PO trails were minimal d/t concerns for aspiration and poor airway protection given lethargy and weak cough.        Clinical Impressions  Patient presents with clinical signs of oropharyngeal dysphagia suspect acute r/t CVA. A non-oral source of nutrition/hydration/meds is indicated. A diagnostic swallow study is indicated to objectively assess swallow function and further POC.        Recommendations  1.  NPO, non oral nutrition/hydration/meds  2.  Instrumentation: Instrumental swallow study pending clinical progress  3.  Swallowing Instructions & Precautions:   Medication: Non Oral   Oral Care: Q6h  4.  SLP following    Plan    Recommend Speech Therapy 5 times per week until therapy goals are met for the following treatments:  Dysphagia Training and Patient / Family / Caregiver Education.         Objective       11/03/22 0825   Patient / Family Goals   Patient / Family Goal #1 none stated   Short Term Goals   Short Term Goal # 1 Patient will participate in pre-feeding trials with SLP only with no overt s/sx of aspiration

## 2022-11-03 NOTE — PROGRESS NOTES
4 Eyes Skin Assessment Completed by HELENA amor and HELENA hedrick.    Head WDL  Ears WDL  Nose WDL  Mouth WDL  Neck WDL  Breast/Chest WDL  Shoulder Blades WDL  Spine WDL  (R) Arm/Elbow/Hand WDL  (L) Arm/Elbow/Hand WDL  Abdomen WDL  Groin WDL  Scrotum/Coccyx/Buttocks WDL  (R) Leg WDL  (L) Leg Abrasion  (R) Heel/Foot/Toe WDL  (L) Heel/Foot/Toe Redness and Blanching          Devices In Places Tele Box, Blood Pressure Cuff, Pulse Ox, and SCD's      Interventions In Place NC W/Ear Foams, Heel Mepilex, Waffle Overlay, Pillows, Q2 Turns, Barrier Cream, and Dri-Triston Pads    Possible Skin Injury No    Pictures Uploaded Into Epic Yes  Wound Consult Placed N/A  RN Wound Prevention Protocol Ordered Yes

## 2022-11-03 NOTE — ED NOTES
Report received from HELENA Arrington. Pt sleeping in bed, easily arousable to voice. Pt on cardiac monitor, pulse ox, and automatic BP. VSS, saturating above 95% on 2L, SR in the 80s. Call light within reach and pt updated on POC.

## 2022-11-03 NOTE — ED NOTES
MRI screening form completed with sister Karuna via telephone. Karuna states she knows pt's history.

## 2022-11-03 NOTE — ED NOTES
Pt transported off unit with RN. Pt awake and breathing with even, unlabored respirations on RA at time of transfer.

## 2022-11-03 NOTE — PROGRESS NOTES
[facility] Medicine Daily Progress Note    Date of Service  11/3/2022    Chief Complaint  Jacqueline Webb is a 56 y.o. female admitted 11/2/2022 with stroke    Hospital Course  Jacquelnie Webb is a 56 y.o. female who presented 11/2/2022 with a history of type 2 diabetes, high cholesterol, vision loss who presented with altered mental status.  Information obtained from the chart and from the patient's family.  Patient's sister and brother present at bedside.  Family ports exact time of the stroke is not entirely known.  They report that she was last known normal approximately 8 AM this morning and when the patient's brother want to see the patient at approximately 9 AM she was sitting on the couch and motioning that something was wrong.  The brother reports that she appeared extremely confused and became increasingly unresponsive.  At first he felt this may be secondary to her blood sugar since she has had issues with her diabetes in the past.  They felt that the patient needed to rest however they noted that the patient has not shown any improvements and henceforth the patient was brought to the hospital to be evaluated.  Family report the patient has never had a stroke in the past.  They report she has been admitted in the past for diabetic ketoacidosis.  In the ED patient was found to have a left-sided gaze preference, NIH 25       Interval Problem Update  11/3 in bed, seeing in ER, not in distress, no new neuro deficit, MRI and echo pending, PT/OT/ST pending, neurology following, as per neurology no indication for intervention. Asa/statin when cleared by speech.     I have discussed this patient's plan of care and discharge plan at IDT rounds today with Case Management, Nursing, Nursing leadership, and other members of the IDT team.    Consultants/Specialty  neurology    Code Status  Full Code    Disposition  Patient is not medically cleared for discharge.   Anticipate discharge to to skilled nursing facility.  I have  placed the appropriate orders for post-discharge needs.    Review of Systems  Review of Systems   Unable to perform ROS: Acuity of condition      Physical Exam  Temp:  [36.4 °C (97.6 °F)-36.7 °C (98.1 °F)] 36.4 °C (97.6 °F)  Pulse:  [] 83  Resp:  [8-18] 14  BP: (100-139)/(52-90) 122/58  SpO2:  [89 %-100 %] 100 %    Physical Exam  Vitals and nursing note reviewed.   Constitutional:       Appearance: She is ill-appearing.      Comments: awake   HENT:      Head: Normocephalic.      Nose: Nose normal.      Mouth/Throat:      Mouth: Mucous membranes are dry.   Eyes:      General: No scleral icterus.        Right eye: No discharge.         Left eye: No discharge.      Pupils: Pupils are equal, round, and reactive to light.      Comments: Eye movement +, but does not follows commands, probably unable to see no blinking to object approaches, blinks when I touch her eyelashes    Cardiovascular:      Rate and Rhythm: Normal rate and regular rhythm.      Pulses: Normal pulses.   Pulmonary:      Effort: Pulmonary effort is normal. No respiratory distress.      Breath sounds: Normal breath sounds.   Abdominal:      General: Bowel sounds are normal.      Tenderness: There is no guarding.   Musculoskeletal:         General: Normal range of motion.      Cervical back: Normal range of motion.      Right lower leg: No edema.      Left lower leg: No edema.   Skin:     General: Skin is warm and dry.      Capillary Refill: Capillary refill takes 2 to 3 seconds.   Neurological:      Comments: Awake, does not follow commands.   Right arm/hand weakness 2/5, with some stiffness.  Left upper and lower ext and right lower ext power 5/5   Psychiatric:         Mood and Affect: Mood normal.       Fluids  No intake or output data in the 24 hours ending 11/03/22 1200    Laboratory  Recent Labs     11/02/22  1409 11/03/22  0102   WBC 8.8 9.4   RBC 4.50 4.33   HEMOGLOBIN 13.0 12.6   HEMATOCRIT 40.6 38.6   MCV 90.2 89.1   MCH 28.9 29.1   MCHC  32.0* 32.6*   RDW 40.6 41.1   PLATELETCT 326 324   MPV 9.0 9.2     Recent Labs     11/02/22  1409 11/03/22  0102   SODIUM 138 139   POTASSIUM 4.8 4.3   CHLORIDE 102 103   CO2 22 23   GLUCOSE 215* 191*   BUN 36* 40*   CREATININE 0.81 0.85   CALCIUM 9.6 9.2     Recent Labs     11/02/22  1409   APTT 23.4*   INR 1.04         Recent Labs     11/03/22  0102   TRIGLYCERIDE 127   HDL 56   *       Imaging  DX-CHEST-PORTABLE (1 VIEW)   Final Result      No acute cardiac or pulmonary abnormalities are identified.      CT-CTA NECK WITH & W/O-POST PROCESSING   Final Result      1.  Occluded LEFT extracranial internal carotid artery. This could be due to thromboembolic disease or dissection.   2.  Estimated 50-75% stenosis of the proximal RIGHT internal carotid artery   3.  Thyroid nodules      CT-CEREBRAL PERFUSION ANALYSIS   Final Result      1.  Moderate sized completed area of infarction noted in the left frontal parasylvian region.      CT-CTA HEAD WITH & W/O-POST PROCESS   Final Result      1.  Occlusion of the imaged extracranial LEFT internal carotid artery through the cavernous segment with reconstitution of the supraclinoid segment   2.  LEFT M3 branch occlusion            Findings were communicated with and acknowledged by Dr. Sultana via [employer] on 11/2/2022 2:34 PM.      CT-HEAD W/O   Final Result      1.  Moderate sized acute area of infarction involving the left frontal parasylvian region.      2.  Smaller encephalomalacia thinning to the cortex in the right parietal-occipital region.      3.  Mild age-related cerebral atrophy.         EC-ECHOCARDIOGRAM COMPLETE W/O CONT    (Results Pending)   MR-BRAIN-W/O    (Results Pending)        Assessment/Plan  * Acute CVA (cerebrovascular accident) (HCC)- (present on admission)  Assessment & Plan  Left internal carotid artery occlusion  Left M3 segment occlusion  Admit to neurology  Neurology following  Monitor on telemetry  Lipid panel and A1c ordered  PT/OT,  SLP ordered  MRI ordered today.   Echo pending.     Vision loss  Assessment & Plan  Family report that the patient has a history of vision loss over the last few months.  They are not sure why she has been losing her vision.  She does have a history of uncontrolled diabetes.  Patient is now presenting with left-sided gaze secondary to her stroke.  F/u MRI brain.   PT/OT    HLD (hyperlipidemia)  Assessment & Plan  Family report the patient has a history of high cholesterol.  Lipid panel ordered.    Type 2 diabetes mellitus (HCC)  Assessment & Plan  Family report the patient has a history of type 2 diabetes and has been admitted for DKA in the past.  Insulin sliding scale  A1c  Glucose checks every 6 hours  monitor         VTE prophylaxis: SCDs/TEDs    I have performed a physical exam and reviewed and updated ROS and Plan today (11/3/2022). In review of yesterday's note (11/2/2022), there are no changes except as documented above.      Patient is has a high medical complexity and is at high risk for complication, morbidity, and mortality.

## 2022-11-04 PROBLEM — R91.1 LUNG NODULE SEEN ON IMAGING STUDY: Status: ACTIVE | Noted: 2022-01-01

## 2022-11-04 NOTE — CARE PLAN
"The patient is Stable - Low risk of patient condition declining or worsening    Shift Goals  Clinical Goals: Maintain Skin Integrity/Monitor Neuro Status/Place NG tube  Patient Goals: DALE  Family Goals: DALE    Progress made toward(s) clinical / shift goals: Assumed care of patient at 1915. Unable to assess orientation status, however patient does nod appropriately and says a few words. Patient follows commands. Q4hr neuro checks are being completed. NIHSS = 17. Mepitel One placed on L knee abrasion. NG tube placed. Fall/Aspiration precautions are in place. Bed is low and locked, bed alarm is on, call light is within reach, hourly rounding continues.     Problem: Respiratory - Stroke Patient  Goal: Patient will achieve/maintain optimum respiratory rate/effort  Outcome: Progressing  Note: Patient requires 1L of O2 via NC while asleep.      Problem: Skin Integrity  Goal: Skin integrity is maintained or improved  Outcome: Progressing  Note: Waffle overlay, Q2hr turns, Heel mepilexes, and barrier paste in place.      Problem: Fall Risk  Goal: Patient will remain free from falls  Outcome: Progressing  Note: Treaded slippers are on, yellow fall risk bracelet is on, fall risk sign is outside, bed is low and locked.        Patient is not progressing towards the following goals:    Problem: Knowledge Deficit - Stroke Education  Goal: Patient's knowledge of stroke and risk factors will improve  Outcome: Not Progressing  Note: DALE.      Problem: Psychosocial - Patient Condition  Goal: Patient's ability to verbalize feelings about condition will improve  Outcome: Not Progressing  Note: Patient nods appropriately, says only a few words \"yes\" or \"okay\"      Problem: Neuro Status  Goal: Neuro status will remain stable or improve  Outcome: Not Progressing  Note: Expressive aphasia present.      Problem: Dysphagia  Goal: Dysphagia will improve  Outcome: Not Progressing  Note: NG tube placed.      Problem: Urinary and Bowel " Elimination  Goal: Establish and maintain regular urinary output  Outcome: Not Progressing  Note: Patient is incontinent of bowel and bladder. Purewick in place.      Problem: Mobility - Stroke  Goal: Patient's capacity to carry out activities will improve  Outcome: Not Progressing  Note: Unable to ambulate patient.      Problem: Self Care  Goal: Patient will have the ability to perform ADLs independently or with assistance (bathe, groom, dress, toilet and feed)  Outcome: Not Progressing     Problem: Knowledge Deficit - Standard  Goal: Patient and family/care givers will demonstrate understanding of plan of care, disease process/condition, diagnostic tests and medications  Outcome: Not Progressing  Note: Patient unable to demonstrate understanding of plan of care.

## 2022-11-04 NOTE — PROGRESS NOTES
NG tube placed with Bridle at 2140. Awaiting tube placement X-ray verification.     0020: Verification reported that NG tube needed advancement, NG tube was measured at the esophageal junction. Advanced NG tube. Awaiting another tube placement X-ray verification.

## 2022-11-04 NOTE — THERAPY
Physical Therapy   Initial Evaluation     Patient Name: Jacqueline Webb  Age:  56 y.o., Sex:  female  Medical Record #: [id number]  Today's Date: 11/4/2022     Precautions  Precautions: Fall Risk  Comments: R deficits, non verbal    Assessment  Patient is 56 y.o. female admitted with AMS, L gaze preference.  MRI revealed acute infarct in L frontal, insular, and L medial temporal region with small acute infarct noted in L internal capsule and adjacent basal ganglia.  PMH includes DM2, high cholesterol, vision loss, and breast cancer.  Today patient presented with impaired strength, balance, initiation, cognition, vision, and activity tolerance.  She required max A for all functional mobility.  Cued patient for marching in standing and taking step however no initiation noted.  Further mobility limited by urinary incontinence in standing.  Patient unable to provide any history, unreliably nodded yes to all questions and cues.  Recommend continued acute skilled PT and post acute placement.    Plan    Recommend Physical Therapy 4 times per week until therapy goals are met for the following treatments:  Bed Mobility, Equipment, Gait Training, Neuro Re-Education / Balance, Self Care/Home Evaluation, Stair Training, Therapeutic Activities, and Therapeutic Exercises    DC Equipment Recommendations: Unable to determine at this time  Discharge Recommendations: Recommend post-acute placement for additional physical therapy services prior to discharge home     Objective     11/04/22 0949   Precautions   Precautions Fall Risk;Swallow Precautions ( See Comments)   Comments Non verbal, R deficits   Prior Living Situation   Prior Services Unable To Determine At This Time   Lives with - Patient's Self Care Capacity Unable To Determine At This Time   Comments Per chart review from 2014 pt lived with her sister and brother in law, uncertain if living situation still accurate   Prior Level of Functional Mobility   Bed Mobility Unable To  Determine At This Time   Transfer Status Unable To Determine At This Time   Ambulation Unable To Determine At This Time   Cognition    Cognition / Consciousness X   Ability To Follow Commands 1 Step (simple 1 step commands, especially with automatic tasks)   Safety Awareness Impaired   New Learning Impaired   Attention Impaired   Sequencing Impaired   Initiation Impaired   Comments Pt non-verbal, nodded yes to all questions.   Active ROM Lower Body    Active ROM Lower Body  WDL   Comments WFL with functional mobility   Strength Lower Body   Lower Body Strength  X   Comments WFL with standing, further testing not completed   Vision   Vision Comments Unable to look to R past midline, L gaze preference   Balance Assessment   Sitting Balance (Static) Poor +   Sitting Balance (Dynamic) Poor   Standing Balance (Static) Trace +   Standing Balance (Dynamic) Trace   Weight Shift Sitting Poor   Weight Shift Standing Poor   Gait Analysis   Gait Level Of Assist Unable to Participate   Comments Provided cues for taking steps, no initiation noted.  Further mobility limited by incontinence   Bed Mobility    Supine to Sit Maximal Assist   Sit to Supine Maximal Assist   Scooting Maximal Assist   Rolling Maximal Assist to Rt.;Maximum Assist to Lt.   Functional Mobility   Sit to Stand Maximal Assist   Bed, Chair, Wheelchair Transfer Maximal Assist (assist to move  to EOB)   Mobility supine <> sit, STS x 1   Activity Tolerance   Sitting Edge of Bed 10-15 min   Standing 2 min   Short Term Goals    Short Term Goal # 1 Pt will perform supine <> sit with min A within 6 visits to progress bed mobility   Short Term Goal # 2 Pt will perform STS with min A and LRAD within 6 visits to progress OOB mobility   Short Term Goal # 3 Pt will perform functional transfers with LRAD and min A within 6 visits within 6 visits to progress OOB mobility   Short Term Goal # 4 Pt will ambulate 25 ft with LRAD and mod A within 6 visits to initiate  gait training   Anticipated Discharge Equipment and Recommendations   DC Equipment Recommendations Unable to determine at this time   Discharge Recommendations Recommend post-acute placement for additional physical therapy services prior to discharge home

## 2022-11-04 NOTE — DIETARY
"Nutrition Support Assessment   Day 2 of admit.  Jacqueline Webb is a 56 y.o. female with admitting DX of Acute CVA.     Current problem list:  Acute CVA  T2DM  HLD  Vision loss     Assessment:  Estimated Nutritional Needs: based on:   Height: 147.3 cm (4' 10\")  Weight: 54.4 kg (120 lb) - other healthcare, taken in ER  Weight to Use in Calculations: 54.4 kg (119 lb 14.9 oz)  Body mass index is 25.08 kg/m²., BMI classification: overweight    Calculation/Equation: MSJ x 1.2 = 1218 kcals  Total Calories / day: 1200 - 1500 (Calories / k - 28)  Total Grams Protein / day: 55 - 65 (Grams Protein / k - 1.2)     Evaluation:   Consult received for TF.  NG tube was in place earlier this am, pt failed SLP eval and await results of FEES with plan for replacement of NG in interim.  EEG monitoring.  Skin: No wounds or edema noted.  Labs: Glucose 193, BUN 47, A1C 11.4,   Meds: lipitor, SSI, ofran prn, bowel protocol, LR infusion, LR infusion  Last BM:   CHO-controlled formula appropriate for blood sugar control     Malnutrition Risk: N/A     Recommendations/Plan:  Start TF Diabetisource AC at 25 ml/hr and advance per protocol to goal rate of 50 ml/hr to provide 1440 kcals, 72 gm protein, and 984 ml free water per day.  Fluids per MD.   Diet upgrades per SLP.     RD following.              " 4 weeks

## 2022-11-04 NOTE — THERAPY
Speech Language Pathology  Daily Treatment     Patient Name: Jacqueline Webb  Age:  56 y.o., Sex:  female  Medical Record #: [id number]  Today's Date: 11/4/2022     Precautions  Precautions: Fall Risk, Swallow Precautions ( See Comments)  Comments: R rocio; aphasic    HPI: Pt is a 56 y.o. female who presented 11/2/2022 with altered mental status. CSE 11/3.     Subjective  Pt calm, no s/sx of distress or frustration today throughout SLP evaluation. No stated subjective.    Per RN, NG tube removed around 7AM for pt MRI, plans to replace today. Per PT/OT, pt stood x1 during evaluation with no initiation or further mobilization attempts and with urinary incontinence.     Assessment  PO trials administered: ice chips x15, small sips TN0 via straw x5, 1/2 tsp LQ3 x3. Pt with good automaticity for PO throughout, including appropriate suck for straw use, total oral bolus stripping, appropriate labial seal w/o anterior loss of bolus. Total clearance of bolus from oral cavity noted throughout, no pocketing or lingual residue noted. Swallow appreciated for each bolus given. Intermittent concern for delay of swallow given 2-second stoppage in observable swallow preparation/movement prior to appreciated initiation. Intermittent concern for shallow airway invasion or pharyngeal inefficiency given two swallows apprenticed for small volume bolus. Dry, prolonged cough appreciated x2/5 sips TN0, x5/15 with ice chips. Limited assessment of s/sx concerning for airway invasion d/t pt without vocalization attempts throughout. No s/sx of distress or increased WOB.     Clinical Impressions  Pt presents w/ s/sx of oropharyngeal dysphagia, evidenced by cough with TN0 and ice chips. Suspect acute d/t recent CVA. Would recommend NPO in advance of further recommendations from FEES. RN to replace TF in the interm.      Recommendations  1.  NPO/TF pending results of FEES  2.  Swallowing Instructions & Precautions:   Medication: Non Oral   Oral Care:  Q6h  3.  Ice chips following oral care one at a time as pt allows to prevent xerostomia and atrophy      Plan    Continue current treatment plan.    Discharge Recommendations: Recommend post-acute placement for additional speech therapy services prior to discharge home       Objective   11/04/22 1126   Vitals   O2 Delivery Device None - Room Air   Dysphagia    Diet / Liquid Recommendation NPO;Pre-Feeding Trials with SLP Only   Patient / Family Goals   Patient / Family Goal #1 Shook head Y for more ice chips   Goal #1 Outcome Progressing as expected   Short Term Goals   Short Term Goal # 1 Patient will participate in pre-feeding trials with SLP only with no overt s/sx of aspiration   Education Group   Education Provided Dysphagia;Role of Speech Therapy   Additional Comments Pt w/o learning evidence   Interdisciplinary Plan of Care Collaboration   Collaboration Comments RN updated. Discussed mobility w/ PT/OT to assist w/ FEES vs. MBSS determination. MD updated, FEES requested

## 2022-11-04 NOTE — PROGRESS NOTES
Hospital Medicine Daily Progress Note    Date of Service  11/4/2022    Chief Complaint  Jacqueline Webb is a 56 y.o. female admitted 11/2/2022 with altered mental status    Hospital Course  Jacqueline Webb is a 56 y.o. female who presented 11/2/2022 with a history of type 2 diabetes, high cholesterol, vision loss who presented with altered mental status.  ED patient was found to have a left-sided gaze preference, NIH 25.  Subsequent MRI noted with left frontal insular temporal infarct.  Evaluated by neurology recommended continue aspirin, Lipitor and follow-up with outpatient with [facility].  PT/OT recommending postacute placement.         Interval Problem Update  11/4/2022  Vitals remained stable  Patient is aphasic.  She follows for command.  Labs unremarkable  On feeding tube.  SLP following  Noted to have 10 mm chest nodule on abdominal x-ray.  Will get CT chest for further evaluation.  Likely need outpatient follow-up with pulmonology for serial CT scan.   assisting with placement      I have discussed this patient's plan of care and discharge plan at IDT rounds today with Case Management, Nursing, Nursing leadership, and other members of the IDT team.    Consultants/Specialty  neurology    Code Status  Full Code    Disposition  Patient is not medically cleared for discharge.   Anticipate discharge to to skilled nursing facility.  I have placed the appropriate orders for post-discharge needs.    Review of Systems  Review of Systems   Unable to perform ROS: Acuity of condition      Physical Exam  Temp:  [36.1 °C (97 °F)-36.6 °C (97.9 °F)] 36.6 °C (97.9 °F)  Pulse:  [] 96  Resp:  [12-17] 17  BP: (112-134)/(63-77) 113/70  SpO2:  [90 %-97 %] 97 %    Physical Exam  Constitutional:       General: She is not in acute distress.  HENT:      Head: Normocephalic and atraumatic.      Right Ear: Tympanic membrane normal.      Left Ear: Tympanic membrane normal.      Nose: Nose normal.       Mouth/Throat:      Mouth: Mucous membranes are moist.   Eyes:      Extraocular Movements: Extraocular movements intact.      Pupils: Pupils are equal, round, and reactive to light.   Cardiovascular:      Rate and Rhythm: Normal rate and regular rhythm.      Pulses: Normal pulses.   Pulmonary:      Effort: Pulmonary effort is normal. No respiratory distress.   Abdominal:      General: Abdomen is flat. There is no distension.   Musculoskeletal:      Cervical back: Normal range of motion.      Right lower leg: No edema.      Left lower leg: No edema.   Skin:     General: Skin is warm.   Neurological:      Mental Status: She is alert. Mental status is at baseline. She is disoriented.      Comments: Patient is aphasic.  Noted right upper extremity weakness 1/5, moving fingers .  Follows command all other extremities 5 out of 5     Psychiatric:         Mood and Affect: Mood normal.       Fluids    Intake/Output Summary (Last 24 hours) at 11/4/2022 1209  Last data filed at 11/4/2022 0745  Gross per 24 hour   Intake 105 ml   Output 450 ml   Net -345 ml       Laboratory  Recent Labs     11/02/22  1409 11/03/22  0102 11/04/22  0048   WBC 8.8 9.4 9.9   RBC 4.50 4.33 4.38   HEMOGLOBIN 13.0 12.6 12.9   HEMATOCRIT 40.6 38.6 39.9   MCV 90.2 89.1 91.1   MCH 28.9 29.1 29.5   MCHC 32.0* 32.6* 32.3*   RDW 40.6 41.1 41.3   PLATELETCT 326 324 331   MPV 9.0 9.2 9.5     Recent Labs     11/02/22  1409 11/03/22  0102 11/04/22  0048   SODIUM 138 139 142   POTASSIUM 4.8 4.3 4.1   CHLORIDE 102 103 105   CO2 22 23 22   GLUCOSE 215* 191* 193*   BUN 36* 40* 47*   CREATININE 0.81 0.85 0.85   CALCIUM 9.6 9.2 9.4     Recent Labs     11/02/22  1409   APTT 23.4*   INR 1.04         Recent Labs     11/03/22  0102   TRIGLYCERIDE 127   HDL 56   *       Imaging  MR-BRAIN-W/O   Final Result         Acute infarct in the left frontal, insular and left medial temporal region. Small acute infarct also noted in the left internal capsule anterior limb and  adjacent basal ganglia.      Occlusion of the left distal cervical and petrous ICA.      Remote right parietal infarct.      Age-related volume loss and chronic microvascular ischemic changes.      DX-ABDOMEN FOR TUBE PLACEMENT   Final Result         1.  Nonspecific bowel gas pattern in the upper abdomen.   2.  Nasogastric tube tip terminates overlying the expected location of the pylorus or first duodenal segment.   3.  Atherosclerosis      DX-ABDOMEN FOR TUBE PLACEMENT   Final Result         1.  Nonspecific bowel gas pattern in the upper abdomen.   2.  Nasogastric tube with side-port at the gastroesophageal junction, recommend advancement.   3.  Right midlung nodular density, recommend follow-up CT chest for further characterization.   4.  Atherosclerosis      These findings were discussed with the patient's clinician, Dr. Puente, on 11/4/2022 7:25 AM.      EC-ECHOCARDIOGRAM COMPLETE W/O CONT   Final Result      DX-CHEST-PORTABLE (1 VIEW)   Final Result      No acute cardiac or pulmonary abnormalities are identified.      CT-CTA NECK WITH & W/O-POST PROCESSING   Final Result      1.  Occluded LEFT extracranial internal carotid artery. This could be due to thromboembolic disease or dissection.   2.  Estimated 50-75% stenosis of the proximal RIGHT internal carotid artery   3.  Thyroid nodules      CT-CEREBRAL PERFUSION ANALYSIS   Final Result      1.  Moderate sized completed area of infarction noted in the left frontal parasylvian region.      CT-CTA HEAD WITH & W/O-POST PROCESS   Final Result      1.  Occlusion of the imaged extracranial LEFT internal carotid artery through the cavernous segment with reconstitution of the supraclinoid segment   2.  LEFT M3 branch occlusion            Findings were communicated with and acknowledged by Dr. Sultana via [employer] on 11/2/2022 2:34 PM.      CT-HEAD W/O   Final Result      1.  Moderate sized acute area of infarction involving the left frontal parasylvian region.       2.  Smaller encephalomalacia thinning to the cortex in the right parietal-occipital region.      3.  Mild age-related cerebral atrophy.         DX-ESOPHAGUS - [id number]    (Results Pending)   DX-ABDOMEN FOR TUBE PLACEMENT    (Results Pending)        Assessment/Plan  * Acute CVA (cerebrovascular accident) (HCC)- (present on admission)  Assessment & Plan  MRI noted with left frontal insular temporal infarct  Continue on aspirin/Lipitor  Evaluated by neurology  PT/OT recommending postacute placement   assisting    Lung nodule seen on imaging study  Assessment & Plan  Noted to have 10 mm chest nodule on abdominal x-ray.  Will get CT chest for further evaluation.  Likely need outpatient follow-up with pulmonology for serial CT scan.    Vision loss  Assessment & Plan  In setting of acute stroke      HLD (hyperlipidemia)  Assessment & Plan  Family report the patient has a history of high cholesterol.  Lipid panel ordered.    Type 2 diabetes mellitus (HCC)  Assessment & Plan  Family report the patient has a history of type 2 diabetes and has been admitted for DKA in the past.  Insulin sliding scale  A1c  Glucose checks every 6 hours  monitor       VTE prophylaxis: SCDs/TEDs and enoxaparin ppx    I have performed a physical exam and reviewed and updated ROS and Plan today (11/4/2022). In review of yesterday's note (11/3/2022), there are no changes except as documented above.

## 2022-11-04 NOTE — CARE PLAN
The patient is Stable - Low risk of patient condition declining or worsening    Shift Goals  Clinical Goals: stable neuro assessments  Patient Goals: khadra  Family Goals: KHADRA    Progress made toward(s) clinical / shift goals:  neuro assessments stable     Patient is not progressing towards the following goals:

## 2022-11-04 NOTE — THERAPY
Occupational Therapy   Initial Evaluation     Patient Name: Jacqueline Webb  Age:  56 y.o., Sex:  female  Medical Record #: [id number]  Today's Date: 11/4/2022     Precautions  Precautions: Fall Risk  Comments: R rocio; aphasic    Assessment  Patient is 56 y.o. female with a diagnosis of stroke with AMS and left gaze preference.  Additional factors influencing patient status / progress: Living situation and PLOF is unknown but in earlier chart pt was residing with family. Pt is following simple commands but lacks initiation and is unable to perform self-care. Pt will benefit from acute OT to increase independence in ADLS. Pt will need post acute placement.      Plan    Recommend Occupational Therapy 4 times per week until therapy goals are met for the following treatments:  Adaptive Equipment, Neuro Re-Education / Balance, Self Care/Activities of Daily Living, Therapeutic Activities, and Therapeutic Exercises.    DC Equipment Recommendations: Unable to determine at this time  Discharge Recommendations: (P) Recommend post-acute placement for additional occupational therapy services prior to discharge home        11/04/22 0950   Prior Living Situation   Prior Services Unable To Determine At This Time   Equipment Owned Unable to Determine At This Time   Lives with - Patient's Self Care Capacity Unable To Determine At This Time  (previous chart: with sister and bro in law)   Comments Pt non-verbal; previous chart says pt was living with family; A & O x 4 in 2014. Same address now   Prior Level of ADL Function   Self Feeding Unable To Determine At This Time   Prior Level of IADL Function   Medication Management Unable To Determine At This Time   Precautions   Precautions Fall Risk   Comments R rocio; aphasic   Cognition    Cognition / Consciousness X   Speech/ Communication Expressive Aphasia  (nods yes to all questions; follows simple commands)   Level of Consciousness Responds to voice   Ability To Follow Commands 1  Step  (simple commands; does not indicate needs)   Safety Awareness Impaired   Sequencing Impaired   Initiation Impaired   Comments flat affect   Passive ROM Upper Body   Passive ROM Upper Body WDL   Active ROM Upper Body   Active ROM Upper Body  X   Comments R flaccid   Strength Upper Body   Upper Body Strength  X   Comments R flaccid   Sensation Upper Body   Comments unable to determine; pt not able to communicate effectively   Upper Body Muscle Tone   Upper Body Muscle Tone  X   Rt Upper Extremity Muscle Tone Hypotonic;Non Functional   Neurological Concerns   Neurological Concerns Yes   Sitting Posture During ADL's Lateral Lean Right   Standing Posture During ADL's Lateral Lean Right   Rt Upper Extremity Gross Motor Control Absent   Rt Upper Extremity Fine Motor Control Absent   Rt Upper Extremity Functional Use Absent   Shoulder Subluxation Mild Left   Coordination Upper Body   Coordination X   Comments R flaccid   Balance Assessment   Sitting Balance (Static) Poor -   Sitting Balance (Dynamic) Poor -   Standing Balance (Static) Trace +   Standing Balance (Dynamic) Dependent   Weight Shift Sitting Poor   Weight Shift Standing Absent   Bed Mobility    Supine to Sit Maximal Assist   Sit to Supine Maximal Assist   Scooting Maximal Assist   Rolling Maximal Assist to Rt.;Maximum Assist to Lt.   ADL Assessment   Grooming Maximal Assist   Bathing Maximal Assist   Upper Body Dressing Maximal Assist   Lower Body Dressing Maximal Assist   Toileting Total Assist   Comments No indication of need to toilet or need for cleaning; pt urinated large amount while standing at EOB   Functional Mobility   Sit to Stand Maximal Assist   Bed, Chair, Wheelchair Transfer Maximal Assist   Transfer Method Stand Pivot   Comments bilateral support; lacking initiation but pushing through legs once standing intiated   Visual Perception   Visual Perception  X   Neglect Moderate Right    Comments Will look to midline but not past to right field    Activity Tolerance   Sitting Edge of Bed 15 min   Standing 2 min   Patient / Family Goals   Patient / Family Goal #1 unable to state   Short Term Goals   Short Term Goal # 1 seated light grooming using left UE with min a.   Short Term Goal # 2 Toilleting with min a on BSC or in bathroom.   Problem List   Problem List Decreased Active Daily Living Skills;Decreased Homemaking Skills;Decreased Upper Extremity Strength Right;Decreased Upper Extremity AROM Right;Decreased Functional Mobility;Decreased Activity Tolerance;Safety Awareness Deficits / Cognition;Impaired Postural Control / Balance;Impaired Coordination Right Upper Extremity;Impaired Upper Extremity Tone Right   Anticipated Discharge Equipment and Recommendations   DC Equipment Recommendations Unable to determine at this time   Discharge Recommendations Recommend post-acute placement for additional occupational therapy services prior to discharge home

## 2022-11-04 NOTE — THERAPY
"Speech Language Pathology   Initial Assessment     Patient Name: Jacqueline Webb  AGE:  56 y.o., SEX:  female  Medical Record #: [id number]  Today's Date: 2022     Precautions  Precautions: Fall Risk, Swallow Precautions ( See Comments)  Comments: R rocio; aphasic    HPI: Pt is a 56 y.o. female who presented 2022 with altered mental status.    CMHx: CVA of L frontal, insular, and temporal lobes  PMHx: DM, HLD, vision loss    MRI Brain w/o :  \"Acute infarct in the left frontal, insular and left medial temporal region. Small acute infarct also noted in the left internal capsule anterior limb and adjacent basal ganglia.    Occlusion of the left distal cervical and petrous ICA.     Remote right parietal infarct.     Age-related volume loss and chronic microvascular ischemic changes.\"    Level of Consciousness: Alert, Awake  Affect/Behavior: Calm  Follows Directives: Inconsistently with simple commands  Orientation: Self, city, state  Hearing: Functional hearing  Vision:  Vision loss per EMR    Prior Living Situation & Level of Function:  Pt is non-speaking, unable to provide history. Per EMR, pt historically lived with sister and ANDRÉS.       Subjective  Pt calm, no s/sx of distress or frustration today throughout SLP evaluation. No stated subjective. PT/OT report no consistent Y/N during session and limited command following.       Assessment  The pt was seen for an aphasia evaluation this date. The Western Aphasia Battery - Bedside was administered and scored by this SLP. Additional information obtained through clinical interview and observation. Results are as follows:    WAB Bedside  Spontaneous Speech Content: 0  Spontaneous Speech Fluency: 0  Auditory Verbal Comprehension Y/N Questions: 5  Sequential Commands: 0  Repetition: 0  Object Namin  Bedside Aphasia Score: 8.33      Bedside Language Score not assessed d/t severity of deficits in verbal expression and auditory comprehension. Pending clinical " progress, further assessment may be warranted.     Comments:  Pt able to follow simple 1-step commands given visual/tactile cues from SLP w/ 45% accuracy. Able to orient to self, city, and state using Y/N. Disoriented to situation (hospital) and date, indicated yes when asked if she is at home and when asked if it is currently December. Y response is indicated by clear head shake; no response is indicated by change in facial expression with limited response. Used Y/N to indicate wants and needs x4 throughout session given immediate context. Pt would benefit from more reliable Y/N indicator. Pt able to answer Y/N for egocentric and concrete questions of Bedside WAB, unable to demonstrate abstraction. Pt did not attempt vocalization or verbalization throughout session. Pt unable to correctly receptively  identify objects using field of two, possible component of visual impairment. Pt answered Y/N questions related to object naming with 0/5.     Clinical Impressions  Pt presents with severe aphasia, most consistent with the global sybtype given WAB protocol and clinical assessment. Pt demonstrates relative strength in concrete Y/N. Pt would benefit from skilled SLP tx for aphasia in the acute and post-acute settings and will need 24-hour supervision and direct A with all IADLs upon d/c.     Recommendations  Effective Communication Strategies: concrete Y/N, tactile cues  Pt would benefit from direct 24-hour supervision upon d/c, as well as A with all IADLs.        Plan    Recommend Speech Therapy 5 times per week until therapy goals are met for the following treatments:  Dysphagia Training, Comprehension Training, Expression Training, AAC Training / Development, and Patient / Family / Caregiver Education.    Discharge Recommendations: Recommend post-acute placement for additional speech therapy services prior to discharge home       Objective   11/04/22 1112   Initial Contact Note    Initial Contact Note  Order Received  and Verified, Speech Therapy Evaluation in Progress with Full Report to Follow.   Vitals   O2 Delivery Device None - Room Air   Prior Living Situation   Prior Services Unable To Determine At This Time   Equipment Owned Unable to Determine At This Time   Lives with - Patient's Self Care Capacity Unable To Determine At This Time  (Per EMR: with sister and ANDRÉS)   Prior Level Of Function   Communication Unknown   Swallow Unknown   Patient's Primary Language English   Occupation (Pre-Hospital Vocational) Unable To Determine At This Time   Verbal Expression   Verbal Expression / Aphasia Eval (WDL) X   Comments No verbal output or attempted vocalizations   Auditory Comprehension   Yes / No Questions: Personal Information Minimal (4)   Yes / No Questions: General Information Minimal (4)   Yes / No Questions: Abstract Severe (2)   Identifies Pictures Severe (2)   Follows One Unit Commands Moderate (3)   Follows Two Unit Commands Profound (1)   Reading Comprehension   Comments Not assessed 2' to severity of above deficits   Written Expression   Comments Not assessed 2' to severity of above deficits   Cognitive-Linguistic   Level of Consciousness Alert   Orientation Level Other (Comment)  (Oriented to name, city/state)   WAB - Bedside (Western Aphasia Battery)   Spontaneous Speech: Content  0   Spontaneous Speech: Fluency 0   Auditory Comprehension 5   Sequential Commands 0   Repetition Score  0   Object Naming 0   Bedside Asphasia Score 8.33   Patient / Family Goals   Patient / Family Goal #1 Shook head Y for more ice chips   Short Term Goals   Short Term Goal # 2 Pt will demonstrate a reliable Y and N to express wants and needs x10 in a session.   Short Term Goal # 3 Pt will answer orientation and egocentric Y/N questions given mod cues from SLP w/ 80% accuracy.   Short Term Goal # 4 Pt will follow 1-step commands given mod multisensory cues from SLP with 80% accuracy.   Short Term Goal # 5 Pt will attempt vocalization x3 in  a session given mod cues from SLP.   Education Group   Education Provided CVA Left Hemisphere;Role of Speech Therapy   Additional Comments Pt w/o learning evidence   Problem List   Problem List Dysphagia;Expressive Language Deficit;Receptive Language Deficit;Global Aphasia   Interdisciplinary Plan of Care Collaboration   Collaboration Comments RN updated. Discussed mobility w/ PT/OT to assist w/ FEES vs. MBSS determination. MD updated, FEES requested

## 2022-11-04 NOTE — PROGRESS NOTES
Neurology Progress Note  Neurohospitalist Service, [facility]    Referring Physician: Uvaldo Puente M.D.    Chief Complaint   Patient presents with    Altered Mental Status     Pt found by family this AM with difficulty speaking, pt went to the bathroom and was later found by family in the bathroom with altered mental status not following commands, no signs of trauma, no signs of substance or ETOH on scene per EMS. Hx DM, blood sugar 215       HPI: Refer to initial documented Neurology H&P, as detailed in the patient's chart.    Interval History 11/4: No new events overnight.    Review of systems: In addition to what is detailed in the HPI and/or updated in the interval history, all other systems reviewed and are negative.    Past Medical History:    has a past medical history of Diabetes (HCC).    FHx:  family history is not on file.    SHx:       Medications:    Current Facility-Administered Medications:     lactated ringers infusion, 1,000 mL, Intravenous, Continuous, Frances Gamboa, A.P.R.N., Stopped at 11/03/22 2100    Pharmacy Consult: Enteral tube insertion - review meds/change route/product selection, 1 Each, Other, PHARMACY TO DOSE, Itz Cao M.D.    polyethylene glycol/lytes (MIRALAX) PACKET 1 Packet, 1 Packet, Enteral Tube, QDAY PRN **AND** magnesium hydroxide (MILK OF MAGNESIA) suspension 30 mL, 30 mL, Enteral Tube, QDAY PRN **AND** bisacodyl (DULCOLAX) suppository 10 mg, 10 mg, Rectal, QDAY PRN, Itz Cao M.D.    promethazine (PHENERGAN) tablet 12.5-25 mg, 12.5-25 mg, Enteral Tube, Q4HRS PRN, Itz Cao M.D.    atorvastatin (LIPITOR) tablet 40 mg, 40 mg, Enteral Tube, Q EVENING, Itz Cao M.D., 40 mg at 11/04/22 0405    aspirin (ASA) chewable tab 81 mg, 81 mg, Enteral Tube, DAILY, Itz Cao M.D., 81 mg at 11/04/22 0405    ondansetron (ZOFRAN) syringe/vial injection 4 mg, 4 mg, Intravenous, Q4HRS PRN, Sánchez Olivia,  D.OGio    ondansetron (ZOFRAN ODT) dispertab 4 mg, 4 mg, Oral, Q4HRS PRN, Sánchez Olivia D.O.    promethazine (PHENERGAN) suppository 12.5-25 mg, 12.5-25 mg, Rectal, Q4HRS PRN, Sánchez Olivia D.O.    prochlorperazine (COMPAZINE) injection 5-10 mg, 5-10 mg, Intravenous, Q4HRS PRN, Sánchez Olivia D.O.    insulin regular (HumuLIN R,NovoLIN R) injection, 1-6 Units, Subcutaneous, Q6HRS, 2 Units at 11/04/22 0611 **AND** POC blood glucose manual result, , , Q6H **AND** NOTIFY MD and PharmD, , , Once **AND** Administer 20 grams of glucose (approximately 8 ounces of fruit juice) every 15 minutes PRN FSBG less than 70 mg/dL, , , PRN **AND** dextrose 10 % BOLUS 25 g, 25 g, Intravenous, Q15 MIN PRN, Sánchez Olivia D.O.    Physical Examination:     Vitals:    11/03/22 2100 11/04/22 0116 11/04/22 0336 11/04/22 0730   BP: 134/75 131/73 112/69 113/70   Pulse: 91 100 (!) 102 96   Resp: 17 17 17 17   Temp: 36.1 °C (97 °F) 36.6 °C (97.9 °F) 36.6 °C (97.9 °F)    TempSrc: Temporal Temporal Temporal Temporal   SpO2: 96% 95% 95% 97%   Weight:       Height:             NEUROLOGICAL EXAM:     Patient is awake and alert.  She will follow some commands intermittently.  She is mute would not speak at all.  Cranial 2-12 shows a right facial droop.  Eyes are midline no gaze preference.  Equal reactive pupils.  Hearing appears to be intact.  She does blink to threat on both sides.  Positive cough.  Motor examination she is able to stand on her own now.  She has sustained antigravity strength in the left upper extremity.  Right upper extremity has 2 out of 5 strength at best.  Sensation appears to be decreased in the right upper extremity compared to left.  No signs of ataxia.    Objective Data:    Labs:  Lab Results   Component Value Date/Time    PROTHROMBTM 13.5 11/02/2022 02:09 PM    INR 1.04 11/02/2022 02:09 PM      Lab Results   Component Value Date/Time    WBC 9.9 11/04/2022 12:48 AM    RBC 4.38 11/04/2022 12:48 AM    HEMOGLOBIN 12.9  11/04/2022 12:48 AM    HEMATOCRIT 39.9 11/04/2022 12:48 AM    MCV 91.1 11/04/2022 12:48 AM    MCH 29.5 11/04/2022 12:48 AM    MCHC 32.3 (L) 11/04/2022 12:48 AM    MPV 9.5 11/04/2022 12:48 AM    NEUTSPOLYS 74.20 (H) 11/02/2022 02:09 PM    LYMPHOCYTES 19.70 (L) 11/02/2022 02:09 PM    MONOCYTES 4.80 11/02/2022 02:09 PM    EOSINOPHILS 0.20 11/02/2022 02:09 PM    BASOPHILS 1.00 11/02/2022 02:09 PM      Lab Results   Component Value Date/Time    SODIUM 142 11/04/2022 12:48 AM    POTASSIUM 4.1 11/04/2022 12:48 AM    CHLORIDE 105 11/04/2022 12:48 AM    CO2 22 11/04/2022 12:48 AM    GLUCOSE 193 (H) 11/04/2022 12:48 AM    BUN 47 (H) 11/04/2022 12:48 AM    CREATININE 0.85 11/04/2022 12:48 AM      Lab Results   Component Value Date/Time    CHOLSTRLTOT 243 (H) 11/03/2022 01:02 AM     (H) 11/03/2022 01:02 AM    HDL 56 11/03/2022 01:02 AM    TRIGLYCERIDE 127 11/03/2022 01:02 AM       Lab Results   Component Value Date/Time    ALKPHOSPHAT 88 11/04/2022 12:48 AM    ASTSGOT 24 11/04/2022 12:48 AM    ALTSGPT 20 11/04/2022 12:48 AM    TBILIRUBIN 0.5 11/04/2022 12:48 AM        Imaging/Testing:  MR-BRAIN-W/O   Final Result         Acute infarct in the left frontal, insular and left medial temporal region. Small acute infarct also noted in the left internal capsule anterior limb and adjacent basal ganglia.      Occlusion of the left distal cervical and petrous ICA.      Remote right parietal infarct.      Age-related volume loss and chronic microvascular ischemic changes.      DX-ABDOMEN FOR TUBE PLACEMENT   Final Result         1.  Nonspecific bowel gas pattern in the upper abdomen.   2.  Nasogastric tube tip terminates overlying the expected location of the pylorus or first duodenal segment.   3.  Atherosclerosis      DX-ABDOMEN FOR TUBE PLACEMENT   Final Result         1.  Nonspecific bowel gas pattern in the upper abdomen.   2.  Nasogastric tube with side-port at the gastroesophageal junction, recommend advancement.   3.  Right  midlung nodular density, recommend follow-up CT chest for further characterization.   4.  Atherosclerosis      These findings were discussed with the patient's clinician, Dr. Puente, on 11/4/2022 7:25 AM.      EC-ECHOCARDIOGRAM COMPLETE W/O CONT   Final Result      DX-CHEST-PORTABLE (1 VIEW)   Final Result      No acute cardiac or pulmonary abnormalities are identified.      CT-CTA NECK WITH & W/O-POST PROCESSING   Final Result      1.  Occluded LEFT extracranial internal carotid artery. This could be due to thromboembolic disease or dissection.   2.  Estimated 50-75% stenosis of the proximal RIGHT internal carotid artery   3.  Thyroid nodules      CT-CEREBRAL PERFUSION ANALYSIS   Final Result      1.  Moderate sized completed area of infarction noted in the left frontal parasylvian region.      CT-CTA HEAD WITH & W/O-POST PROCESS   Final Result      1.  Occlusion of the imaged extracranial LEFT internal carotid artery through the cavernous segment with reconstitution of the supraclinoid segment   2.  LEFT M3 branch occlusion            Findings were communicated with and acknowledged by Dr. Sultana via Voalte Me on 11/2/2022 2:34 PM.      CT-HEAD W/O   Final Result      1.  Moderate sized acute area of infarction involving the left frontal parasylvian region.      2.  Smaller encephalomalacia thinning to the cortex in the right parietal-occipital region.      3.  Mild age-related cerebral atrophy.               Assessment and Plan:    56-year-old female presenting with a left MCA syndrome.  Currently still mute.  MRI brain shows left frontal, insula, temporal infarct.  Resulted in severe aphasia.  Mostly expressive however there is some receptive component too.  Etiology infarct most likely artery artery embolic from atherosclerotic disease in the carotid.  For now continue with supportive therapy.  Aggressive secondary stroke risk modifications.  Patient was not a candidate for acute intervention when she arrived  due to stroke being completed on perfusion scans and poor aspect scores on the plain CT.    Plan:  1.  Continue aspirin 81 mg daily  2.  High intensity statin continue Lipitor, however suggested increase to max dose given LDL currently 162 goal is below 70.  3.  Maintain normoglycemia  4.  Maintain normal blood pressures  5.  Continue with speech therapy, currently n.p.o.  6.  PT and OT  7.  Telemetry  8.  Follow-up in stroke Bridge clinic  9.  Zio patch at discharge    Neurology will sign off.  For now continue supportive therapy.  Patient can follow-up stroke bridge clinic.  Please call back if any new issues.      Spent 39 minutes which over half was done at bedside discussing care plan with the nursing staff.  Including continued supportive therapy.  Aspirin for now.    This chart was partially generated using voice recognition technology and sound alike word replacement may be present, best efforts were made to make the chart accurate.    Gio Garay MD  Board Certified Neurology, ABPN  (t) [phone]

## 2022-11-04 NOTE — PROGRESS NOTES
MRI called and said her NG tube had a piece of metal at end of string. Went to MRI and removed NG tube, will reinsert when pt comes back

## 2022-11-04 NOTE — DISCHARGE PLANNING
Case Management Discharge Planning    Admission Date: 11/2/2022  GMLOS: 2.9  ALOS: 2    6-Clicks ADL Score: 6  6-Clicks Mobility Score: 6  PT and/or OT Eval ordered: Yes  Post-acute Referrals Ordered: Yes  Post-acute Choice Obtained: No  Has referral(s) been sent to post-acute provider:  No      Anticipated Discharge Dispo: Discharge Disposition: D/T to SNF with Medicare cert in anticipation of skilled care (03)    DME Needed: TBD    Action(s) Taken: OTHER  Patient recommended for SNF. LMSW met with patient's brother at bedside, patient sleeping. Patient's brother stated that his sister is the decision maker in the family, and she will be arriving around 2pm. LMSW left SNF choice list with brother and asked him and patient's sister to review when she arrives. LMSW to follow up around 3pm for questions/choices.    LMSW returned to room at 3:30PM, patient's sister had not yet arrived. LMSW asked patient's brother to review options with family over weekend and leave SNF choice with RN or CM.    Escalations Completed: None    Medically Clear: No    Next Steps: Obtain SNF choice list from family, fax SNF referrals.    Barriers to Discharge: Medical clearance and Pending Placement    Is the patient up for discharge tomorrow: No

## 2022-11-04 NOTE — DISCHARGE PLANNING
Following for post acute services. Ongoing work up. Need to verify provider for post acute services.

## 2022-11-05 NOTE — DISCHARGE PLANNING
Case Management Discharge Planning    Admission Date: 11/2/2022  GMLOS: 2.9  ALOS: 3    6-Clicks ADL Score: 6  6-Clicks Mobility Score: 6  PT and/or OT Eval ordered: Yes  Post-acute Referrals Ordered: Yes  Post-acute Choice Obtained: Yes  Has referral(s) been sent to post-acute provider:  Yes      Anticipated Discharge Dispo: Discharge Disposition: D/T to SNF with Medicare cert in anticipation of skilled care (03)    DME Needed: No    Action(s) Taken: Updated Provider/Nurse on Discharge Plan and Choice obtained    Escalations Completed: None    Medically Clear: No    Next Steps: Pt was discussed with Charge RN this am. CM followed up with RN, pt's brother completed Choice form for SNF, form faxed to [facility] today. No other dc needs at this time. CM will continue to follow.     Barriers to Discharge: Medical clearance and Pending Placement    Is the patient up for discharge tomorrow: No

## 2022-11-05 NOTE — CARE PLAN
The patient is Stable - Low risk of patient condition declining or worsening    Shift Goals  Clinical Goals: Q4h neuro checks  Patient Goals: DALE  Family Goals: DALE    Progress made toward(s) clinical / shift goals:      Problem: Neuro Status  Goal: Neuro status will remain stable or improve  Outcome: Progressing    Q4H neuro checks in place. No acute changes in neuro status noted at this time.     Problem: Respiratory - Stroke Patient  Goal: Patient will achieve/maintain optimum respiratory rate/effort  Outcome: Progressing  Pt is on 1L NC.      Problem: Fall Risk  Goal: Patient will remain free from falls  Outcome: Progressing   Patient's bed is locked and in the lowest position. Call light within reach. Bed alarm is on and plugged in. Pt given education on how to use the call light and not get up without calling for assistance. Pt receptive and demonstrates and understanding in teaching.          Patient is not progressing towards the following goals:      Problem: Knowledge Deficit - Stroke Education  Goal: Patient's knowledge of stroke and risk factors will improve  Outcome: Not Progressing   Pt is non-verbal and is not able to demonstrate an understanding in stroke education at this time. Will continue to educate when appropriate.

## 2022-11-05 NOTE — PALLIATIVE CARE
"Palliative Care follow-up  PC RN placed call to patient's sister, Karuna. Karuna was able to discuss code status and goals of care with patient's other sister, Serenity. She states that Serenity and Jacqueline \"also had multiple conversations about if this sort of thing were to happen and she also feels that Jacqueline would want a DNR.\" Karuna hopes to discuss this further with Gilberto so that all family members are in agreement on code status prior to changing to DNAR/DNI. Confirmed that she would like code status to remain full code at this time. She will discuss with Gilberto moreno and let the team know.    Plan: Continue to evaluate clinical picture and support pt/family.     Thank you for allowing Palliative Care to support this patient and family. Contact [phone] for additional assistance, change in patient status, or with any questions/concerns.    "

## 2022-11-05 NOTE — PROGRESS NOTES
Assumed patient care around 1900. Patient is non-verbal and is not nodding appropriately, unable to assess orientation status at this time. Bed in lowest position and locked. Call light within reach and bed alarm is set. Hourly rounding continues.

## 2022-11-05 NOTE — PROGRESS NOTES
Hospital Medicine Daily Progress Note    Date of Service  11/5/2022    Chief Complaint  Jacqueline Webb is a 56 y.o. female admitted 11/2/2022 with altered mental status    Hospital Course  Jacqueline Webb is a 56 y.o. female who presented 11/2/2022 with a history of type 2 diabetes, high cholesterol, vision loss who presented with altered mental status.  ED patient was found to have a left-sided gaze preference, NIH 25.  Subsequent MRI noted with left frontal insular temporal infarct.  Evaluated by neurology recommended continue aspirin, Lipitor and follow-up with outpatient with stroke Bridge clinic.  PT/OT recommending postacute placement.         Interval Problem Update  11/4/2022  Vitals remained stable  Patient is aphasic.  She follows for command.  Labs unremarkable  On feeding tube.  SLP following  Noted to have 10 mm chest nodule on abdominal x-ray.  Will get CT chest for further evaluation.  Likely need outpatient follow-up with pulmonology for serial CT scan.   assisting with placement    11/5/2022  Vitals remained stable  Remained aphasic.  Following command. moving all extremities  Labs reviewed  Blood glucose level remain elevated.  Noted significantly elevated A1c  Insulin regimen adjusted  Palliative following for goals of care discussion   assisting with placement    I have discussed this patient's plan of care and discharge plan at IDT rounds today with Case Management, Nursing, Nursing leadership, and other members of the IDT team.    Consultants/Specialty  neurology    Code Status  Full Code    Disposition  Patient is not medically cleared for discharge.   Anticipate discharge to to skilled nursing facility.  I have placed the appropriate orders for post-discharge needs.    Review of Systems  Review of Systems   Unable to perform ROS: Acuity of condition      Physical Exam  Temp:  [36.5 °C (97.7 °F)-36.9 °C (98.4 °F)] 36.7 °C (98.1 °F)  Pulse:  [87-99] 88  Resp:  [16-18]  16  BP: (101-121)/(60-77) 120/77  SpO2:  [84 %-98 %] 96 %    Physical Exam  Constitutional:       General: She is not in acute distress.  HENT:      Head: Normocephalic and atraumatic.      Right Ear: Tympanic membrane normal.      Left Ear: Tympanic membrane normal.      Nose: Nose normal.      Mouth/Throat:      Mouth: Mucous membranes are moist.   Eyes:      Extraocular Movements: Extraocular movements intact.      Pupils: Pupils are equal, round, and reactive to light.   Cardiovascular:      Rate and Rhythm: Normal rate and regular rhythm.      Pulses: Normal pulses.   Pulmonary:      Effort: Pulmonary effort is normal. No respiratory distress.   Abdominal:      General: Abdomen is flat. There is no distension.   Musculoskeletal:      Cervical back: Normal range of motion.      Right lower leg: No edema.      Left lower leg: No edema.   Skin:     General: Skin is warm.   Neurological:      Mental Status: She is alert. Mental status is at baseline. She is disoriented.      Comments: Patient is aphasic.  Noted right upper extremity weakness 1/5, moving fingers .  Follows command all other extremities 5 out of 5     Psychiatric:         Mood and Affect: Mood normal.       Fluids    Intake/Output Summary (Last 24 hours) at 11/5/2022 1208  Last data filed at 11/5/2022 0800  Gross per 24 hour   Intake 225 ml   Output --   Net 225 ml         Laboratory  Recent Labs     11/02/22 1409 11/03/22 0102 11/04/22  0048   WBC 8.8 9.4 9.9   RBC 4.50 4.33 4.38   HEMOGLOBIN 13.0 12.6 12.9   HEMATOCRIT 40.6 38.6 39.9   MCV 90.2 89.1 91.1   MCH 28.9 29.1 29.5   MCHC 32.0* 32.6* 32.3*   RDW 40.6 41.1 41.3   PLATELETCT 326 324 331   MPV 9.0 9.2 9.5       Recent Labs     11/02/22  1409 11/03/22 0102 11/04/22 0048   SODIUM 138 139 142   POTASSIUM 4.8 4.3 4.1   CHLORIDE 102 103 105   CO2 22 23 22   GLUCOSE 215* 191* 193*   BUN 36* 40* 47*   CREATININE 0.81 0.85 0.85   CALCIUM 9.6 9.2 9.4       Recent Labs     11/02/22  1409   APTT  23.4*   INR 1.04           Recent Labs     11/03/22  0102   TRIGLYCERIDE 127   HDL 56   *         Imaging  DX-ABDOMEN FOR TUBE PLACEMENT   Final Result      Enteric tube with catheter tip in the left upper quadrant consistent with intragastric location.      MR-BRAIN-W/O   Final Result         Acute infarct in the left frontal, insular and left medial temporal region. Small acute infarct also noted in the left internal capsule anterior limb and adjacent basal ganglia.      Occlusion of the left distal cervical and petrous ICA.      Remote right parietal infarct.      Age-related volume loss and chronic microvascular ischemic changes.      DX-ABDOMEN FOR TUBE PLACEMENT   Final Result         1.  Nonspecific bowel gas pattern in the upper abdomen.   2.  Nasogastric tube tip terminates overlying the expected location of the pylorus or first duodenal segment.   3.  Atherosclerosis      DX-ABDOMEN FOR TUBE PLACEMENT   Final Result         1.  Nonspecific bowel gas pattern in the upper abdomen.   2.  Nasogastric tube with side-port at the gastroesophageal junction, recommend advancement.   3.  Right midlung nodular density, recommend follow-up CT chest for further characterization.   4.  Atherosclerosis      These findings were discussed with the patient's clinician, Dr. Puente, on 11/4/2022 7:25 AM.      EC-ECHOCARDIOGRAM COMPLETE W/O CONT   Final Result      DX-CHEST-PORTABLE (1 VIEW)   Final Result      No acute cardiac or pulmonary abnormalities are identified.      CT-CTA NECK WITH & W/O-POST PROCESSING   Final Result      1.  Occluded LEFT extracranial internal carotid artery. This could be due to thromboembolic disease or dissection.   2.  Estimated 50-75% stenosis of the proximal RIGHT internal carotid artery   3.  Thyroid nodules      CT-CEREBRAL PERFUSION ANALYSIS   Final Result      1.  Moderate sized completed area of infarction noted in the left frontal parasylvian region.      CT-CTA HEAD WITH & W/O-POST  PROCESS   Final Result      1.  Occlusion of the imaged extracranial LEFT internal carotid artery through the cavernous segment with reconstitution of the supraclinoid segment   2.  LEFT M3 branch occlusion            Findings were communicated with and acknowledged by Dr. Sultana via Voalte Me on 11/2/2022 2:34 PM.      CT-HEAD W/O   Final Result      1.  Moderate sized acute area of infarction involving the left frontal parasylvian region.      2.  Smaller encephalomalacia thinning to the cortex in the right parietal-occipital region.      3.  Mild age-related cerebral atrophy.                Assessment/Plan  * Acute CVA (cerebrovascular accident) (HCC)- (present on admission)  Assessment & Plan  MRI noted with left frontal insular temporal infarct  Continue on aspirin/Lipitor  Evaluated by neurology  PT/OT recommending postacute placement   assisting    Lung nodule seen on imaging study  Assessment & Plan  Noted to have 10 mm chest nodule on abdominal x-ray.  Will get CT chest for further evaluation.  Likely need outpatient follow-up with pulmonology for serial CT scan.    Vision loss  Assessment & Plan  In setting of acute stroke      HLD (hyperlipidemia)  Assessment & Plan  Family report the patient has a history of high cholesterol.  Lipid panel ordered.    Type 2 diabetes mellitus (HCC)  Assessment & Plan  Family report the patient has a history of type 2 diabetes and has been admitted for DKA in the past.  Insulin sliding scale  A1c  Glucose checks every 6 hours  monitor       VTE prophylaxis: SCDs/TEDs and enoxaparin ppx    I have performed a physical exam and reviewed and updated ROS and Plan today (11/5/2022). In review of yesterday's note (11/4/2022), there are no changes except as documented above.

## 2022-11-05 NOTE — CARE PLAN
The patient is Stable - Low risk of patient condition declining or worsening    Shift Goals  Clinical Goals: Safety/maintain skin integrity  Patient Goals: DALE  Family Goals: DALE    Progress made toward(s) clinical / shift goals:    Problem: Risk for Aspiration  Goal: Patient's risk for aspiration will be absent or decrease  Outcome: Progressing   Pt up 30 degrees during tube feedings.     Problem: Fall Risk  Goal: Patient will remain free from falls  Outcome: Progressing   Fall precautions in place. Call light, bedside table, and pt belongings within reach.     Patient is not progressing towards the following goals: N/A

## 2022-11-05 NOTE — CONSULTS
Reason for PC Consult: Advance Care Planning    Consulted by: Dr. Olivia    Assessment:  General: Jacqueline Webb is a 56 year-old female pt with a PMHx of DM II, high cholesterol, and vision loss. She presented to the ED on 2022 with ASM, left-sided gaze preference, and right sided neglect. Imaging revealed left frontal, insula, temporal infarct. She was not a candidate for any intervention. She remains on the neuro unit with expressive aphasia.      Social: Jacqueline is not  and has no children. Her parents are . She lives with her two siblings, Gilberto and Karuna. They have a fourth sibling, Serenity, who lives in [place]. Prior to admission, pt was independent in all ADLs and IADLs. 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    Consults: neurology    Dyspnea: No  Last BM: 22  Pain: Unable to determine  Depression: Unable to determine  Dementia: No    Spiritual:  Is Rastafarian or spirituality important for coping with this illness? Unable to determine  Has a  or spiritual provider visit been requested? Unable to determine    Palliative Performance Scale: 50%    Advance Directive: None  DPOA: No  POLST: No    Code Status: Full     Outcome:  Consult received and EMR reviewed. Discussed case with physician, updates appreciated. Per MD, pt does not have capacity to participate in GOC discussion.     PC RN met with pt's brother, Gilberto, in family room. Pt's sister, Karuna, was included in conversation via speaker phone. Introduced self and role of palliative care. Maxi updated RN on the social and medical history. Assessed family's understanding of current medical status, overall health picture, and options for future care. Demonstrates a good understanding of the current clinical picture.    Explored patient's expressed values, beliefs, and preferences in order to identify GOC. Family reports that Jacqueline values her  "privacy and independence. She lives with her siblings, Karuna and Gilberto. They both report that she has seemed more depressed and withdrawn lately, especially since the loss of her cat recently.    PC RN explored patient's healthcare preferences/goals. Both Gilberto and Karuna report that patient has been resistant to obtaining health insurance and seeking medical care. They state that she \"absolutely refuses to do anything.\" Even when family offered to pay for her to get her vision addressed, pt refused. They also report that she left her diabetes untreated for several years.    PC RN queried if pt has ever completed an Advance Directive in the past. Collete and Gilberto report that pt has never completed an AD to their knowledge. PC RN advised that, per MD, the patient lacks capacity to complete AD at this time.  Family educated that following NOK order, the majority of patient's siblings would be patient's default healthcare surrogate.  Family verbalized understanding and expressed willingness to accept this responsibility.    PC RN queried if pt has ever discussed her healthcare wishes/goals directly with family. Karuna states that she and tia have had \"multiple conversations over the last umpteen years.\" She reports \"I do know that if she got so will or something major happened, she doesn't want to be supported. She doesn't want life support.\"     PC RN explored this further and discussed the issue of code status in detail. Discussed the option of changing code status to reflect DNAR status. Discussed that this does not preclude pt from receiving any other offered medical treatment. Karuna verbalized understanding and confirmed that she would want pt's code status changed to reflect DNAR/DNI. She states \"that, I have no doubt about.\" Gilberto initially agreed with this thought. However, he soon became tearful and expressed having difficulty participating in this decision. Reassurance provided. Karuna agreed to " talk to their other sister, Serenity, to determine her thoughts on this prior to changing code status. PC RN reassured family that no decision needs to be made today. PC RN offered to facilitate further conversation between family if needed.     Active listening, reflection, reminiscing, validation & normalization, and empathic support utilized throughout this encounter.  All questions answered and contact information provided. Karuna to call PC office after discussion with sister, Serenity.    Plan: Will continue to monitor clinical picture, discuss GOC, and support pt/family.     Thank you for allowing Palliative Care to participate in this patient's care. Please feel free to call [phone] with any questions or concerns.

## 2022-11-06 NOTE — CONSULTS
Physical Medicine and Rehabilitation Consultation              Date of initial consultation: 11/6/2022  Requesting provider: Sánchez Olivia DO   Consulting provider: Bree Beal D.O.  Reason for consultation: assess for acute inpatient rehab appropriateness  LOS: 4 Day(s)    Chief complaint: Found down, unresponsive, expressive aphasia    HPI: The patient is a 56 y.o. female with a past medical history of type 2 diabetes, hyperlipidemia, , breast cancer, and hypertension;  who presented on 11/2/2022  2:08 PM after being found down in the bathroom unable to follow commands.  Per documentation, EMS was activated and patient was evaluated in the emergency department and found to have expressive aphasia and left-sided gaze preference.  NIHSS 25 neurology consulted, CT head obtained showed moderate sized acute area of infarction involving the left frontal perisylvian region, CTA head and neck showed an occlusion of the left internal carotid artery, with left M3 branch occlusion.  MRI brain obtained showed left frontal insular temporal infarct, secondary stroke prophylaxis recommendations with aspirin, statin.  Patient was deemed not to be a candidate for tPA and intervention not recommended because it is a complete stroke and per neurology's note this would come with a very high risk of reperfusion hemorrhage with little benefit.  Etiology of CVA suspected to be thromboembolic.  CT chest was obtained which showed a noted 10 mm chest nodule, needs follow-up with pulmonology.  Functionally, patient is functioning at a max assist level.    Patient seen and examined at bedside with brother in room. Brother able to confirm patient lost insurance after she lost her job at [employer]. Brother confirms the family would prefer to do SNF level rehab in order to have prolonged gradual rehab option. Agree with plan, as right now her sibling work and would not have 24/7 care.   Patient unable to provide ROS due to expressive aphasia.      Social Hx:  Patient lives with sister and brother, family unable to provide 24/7 care.   At prior level of function independent with mobility and ADLs      Employment: Previously worked to [employer] but lost her job   Tobacco: Denies  Alcohol: Denies  Drugs: Denies    THERAPY:  Restrictions: Fall risk, swallow precautions  PT: Functional mobility   11/4 max assist for bed mobility and sit to stand, unable to participate in gait    OT: ADLs  11/4 Max assist bed mobility, max assist grooming, max assist bathing, max assist upper body dressing, max assist lower body dressing    SLP:   11/4 n.p.o., tube feeds pending F EES    IMAGING:  CT-CHEST (THORAX) WITH   Final Result       1.  No evidence of pulmonary nodule. Questioned pulmonary nodule on abdominal film due to callus formation from subacute/chronic right anterior fourth rib fracture.       2.  Porcelain gallbladder.       Fleischner Society pulmonary nodule recommendations:   Not Applicable           DX-ABDOMEN FOR TUBE PLACEMENT   Final Result       Enteric tube with catheter tip in the left upper quadrant consistent with intragastric location.       MR-BRAIN-W/O   Final Result           Acute infarct in the left frontal, insular and left medial temporal region. Small acute infarct also noted in the left internal capsule anterior limb and adjacent basal ganglia.       Occlusion of the left distal cervical and petrous ICA.       Remote right parietal infarct.       Age-related volume loss and chronic microvascular ischemic changes.       DX-ABDOMEN FOR TUBE PLACEMENT   Final Result           1.  Nonspecific bowel gas pattern in the upper abdomen.   2.  Nasogastric tube tip terminates overlying the expected location of the pylorus or first duodenal segment.   3.  Atherosclerosis       DX-ABDOMEN FOR TUBE PLACEMENT   Final Result           1.  Nonspecific bowel gas pattern in the upper abdomen.   2.  Nasogastric tube with side-port at the gastroesophageal junction,  recommend advancement.   3.  Right midlung nodular density, recommend follow-up CT chest for further characterization.   4.  Atherosclerosis       These findings were discussed with the patient's clinician, Dr. Puente, on 11/4/2022 7:25 AM.       EC-ECHOCARDIOGRAM COMPLETE W/O CONT   Final Result       DX-CHEST-PORTABLE (1 VIEW)   Final Result       No acute cardiac or pulmonary abnormalities are identified.       CT-CTA NECK WITH & W/O-POST PROCESSING   Final Result       1.  Occluded LEFT extracranial internal carotid artery. This could be due to thromboembolic disease or dissection.   2.  Estimated 50-75% stenosis of the proximal RIGHT internal carotid artery   3.  Thyroid nodules       CT-CEREBRAL PERFUSION ANALYSIS   Final Result       1.  Moderate sized completed area of infarction noted in the left frontal parasylvian region.       CT-CTA HEAD WITH & W/O-POST PROCESS   Final Result       1.  Occlusion of the imaged extracranial LEFT internal carotid artery through the cavernous segment with reconstitution of the supraclinoid segment   2.  LEFT M3 branch occlusion               Findings were communicated with and acknowledged by Dr. Sultana via [employer] on 11/2/2022 2:34 PM.       CT-HEAD W/O   Final Result       1.  Moderate sized acute area of infarction involving the left frontal parasylvian region.       2.  Smaller encephalomalacia thinning to the cortex in the right parietal-occipital region.       3.  Mild age-related cerebral atrophy.       PROCEDURES:  None    PMH:  Past Medical History:   Diagnosis Date    Diabetes (HCC)        PSH:  History reviewed. No pertinent surgical history.    FHX:  History reviewed. No pertinent family history.    Medications:  Current Facility-Administered Medications   Medication Dose    insulin GLARGINE (Lantus,Semglee) injection  10 Units    acetaminophen (Tylenol) tablet 650 mg  650 mg    enoxaparin (Lovenox) inj 40 mg  40 mg    Pharmacy Consult: Enteral tube insertion  "- review meds/change route/product selection  1 Each    polyethylene glycol/lytes (MIRALAX) PACKET 1 Packet  1 Packet    And    magnesium hydroxide (MILK OF MAGNESIA) suspension 30 mL  30 mL    And    bisacodyl (DULCOLAX) suppository 10 mg  10 mg    promethazine (PHENERGAN) tablet 12.5-25 mg  12.5-25 mg    atorvastatin (LIPITOR) tablet 40 mg  40 mg    aspirin (ASA) chewable tab 81 mg  81 mg    ondansetron (ZOFRAN) syringe/vial injection 4 mg  4 mg    ondansetron (ZOFRAN ODT) dispertab 4 mg  4 mg    promethazine (PHENERGAN) suppository 12.5-25 mg  12.5-25 mg    prochlorperazine (COMPAZINE) injection 5-10 mg  5-10 mg    insulin regular (HumuLIN R,NovoLIN R) injection  1-6 Units    And    dextrose 10 % BOLUS 25 g  25 g       Allergies:  No Known Allergies    Physical Exam:  Vitals: /78   Pulse 86   Temp 36.5 °C (97.7 °F) (Temporal)   Resp 16   Ht 1.473 m (4' 10\")   Wt 54.4 kg (120 lb)   SpO2 94%   Gen: NAD, laying comfortably in bed, family in room   Head:  NC/AT, NG tube in place   Eyes/ Nose/ Mouth: PERRLA, moist mucous membranes  Cardio: RRR, good distal perfusion, warm extremities  Pulm: normal respiratory effort, no cyanosis , breathing comfortably on RA   Abd: Soft NTND, negative borborygmi   Ext: No peripheral edema. No calf tenderness. No clubbing.    Mental status: unable to determine expressive aphasia, cooperative but does not initiate movements with exam, does not mirror   Speech: non fluent, does not use yes/no accurately,     CRANIAL NERVES:  2,3: visual acuity grossly intact, PERRL  3,4,6: EOMI bilaterally, no nystagmus or diplopia  5: sensation intact to light touch bilaterally and symmetric  7: no facial asymmetry  8: hearing grossly intact    Motor:formal MMT unable to be performed,  Moves LUE and b/l LE freely, at least 3/5   0/5 RUE  strength, EE, EF, SAB     Sensory:   intact to light touch through out Left side     DTRs: 2+ in bilateral  biceps  No clonus at bilateral " ankles  Negative babinski b/l  Negative Adrian b/l     Tone: no spasticity noted, no cogwheeling noted    Coordination:   intact fine motor with fingers LUE       Labs: Reviewed and significant for   Recent Labs     11/04/22 0048   RBC 4.38   HEMOGLOBIN 12.9   HEMATOCRIT 39.9   PLATELETCT 331     Recent Labs     11/04/22 0048   SODIUM 142   POTASSIUM 4.1   CHLORIDE 105   CO2 22   GLUCOSE 193*   BUN 47*   CREATININE 0.85   CALCIUM 9.4     Recent Results (from the past 24 hour(s))   POCT glucose device results    Collection Time: 11/05/22 11:42 AM   Result Value Ref Range    POC Glucose, Blood 242 (H) 65 - 99 mg/dL   POCT glucose device results    Collection Time: 11/05/22  5:17 PM   Result Value Ref Range    POC Glucose, Blood 266 (H) 65 - 99 mg/dL   POCT glucose device results    Collection Time: 11/05/22 11:43 PM   Result Value Ref Range    POC Glucose, Blood 187 (H) 65 - 99 mg/dL   POCT glucose device results    Collection Time: 11/06/22  5:15 AM   Result Value Ref Range    POC Glucose, Blood 273 (H) 65 - 99 mg/dL         ASSESSMENT:  Patient is a 56 y.o. female admitted with acute CVA with left internal carotid artery occlusion    [facility] Code / Diagnosis to Support: 0001.2 - Stroke: Right Body Involvement (Left Brain)    Rehabilitation: Impaired ADLs and mobility  Patient is a poor candidate for inpatient rehab based on lack of insurance and lack of 24/7 DC support.     Barriers to transfer include: Insurance authorization, TCCs to verify disposition, medical clearance and bed availability     Additional Recommendations:  Left MCA CVA  Left internal carotid artery occlusion  - Greatest deficits are right-sided weakness, dysphagia and expressive aphasia  - MRI with evidence of, insular and left medial temporal region infarct  - CTA with evidence of left MCA occlusion  - Patient not a candidate for tPA or intervention due to her high risk for hemorrhage  - Aspirin and statin for secondary stroke prophylaxis  -  Continue with PT/OT/SLP, no current spasticy       Dysphagia  -Currently n.p.o., pending fees  - Continues on tube feeds, may need PEG tube placement if continues to fail with fees    -Dispo:  - patient is currently functioning below their level of baseline, recommend post acute rehab at SNF level   - recommend SNF level post acute for prolonged rehab program  - not currently eligible for IRF due to lack of DC support and lack of insurance.         Medical Complexity:  Left MCA CVA  Left internal carotid artery occlusion  Dysphagia  Expressive aphasia  Impaired mobility and ADLs      DVT PPX: Lovenox      Thank you for allowing us to participate in the care of this patient.     Patient was seen for 88 minutes on unit/floor of which > 50% of time was spent on counseling and coordination of care regarding the above, including prognosis, risk reduction, benefits of treatment, and options for next stage of care.    Bree Beal D.O.   Physical Medicine and Rehabilitation     Please note that this dictation was created using voice recognition software. I have made every reasonable attempt to correct obvious errors, but there may be errors of grammar and possibly content that I did not discover before finalizing the note.

## 2022-11-06 NOTE — CARE PLAN
The patient is Stable - Low risk of patient condition declining or worsening    Shift Goals  Clinical Goals: Q4h neuro checks, titrate oxygen  Patient Goals: DALE  Family Goals: Rest and comfort    Progress made toward(s) clinical / shift goals:      Problem: Knowledge Deficit - Stroke Education  Goal: Patient's knowledge of stroke and risk factors will improve  Outcome: Progressing  Pt is unable to verbalize an understanding of the plan of care/stroke risk factors at this time. Continuing to educate family and pt on risk factors/education.     Problem: Neuro Status  Goal: Neuro status will remain stable or improve  Outcome: Progressing   Pt is Q4h neuro checks. No acute changes in the pt's neuro status noted at this time.     Problem: Respiratory - Stroke Patient  Goal: Patient will achieve/maintain optimum respiratory rate/effort  Outcome: Progressing   Pt is on 0.5L oxygen nasal cannula. Working on titrating pt off oxygen.     Problem: Skin Integrity  Goal: Skin integrity is maintained or improved  Outcome: Progressing   Pt is Q2h turns. Mepilex, barrier paste, and barrier wipes in use. Pt has grey ear foam behind ears/on oxygen tubing.       Problem: Fall Risk  Goal: Patient will remain free from falls  Outcome: Progressing   Pt's bed is locked and in the lowest position. Call light is within reach.     Patient is not progressing towards the following goals:

## 2022-11-06 NOTE — CARE PLAN
The patient is Stable - Low risk of patient condition declining or worsening    Shift Goals  Clinical Goals: Safety/mobility  Patient Goals: DALE  Family Goals: Rest and comfort    Progress made toward(s) clinical / shift goals:    Problem: Fall Risk  Goal: Patient will remain free from falls  Outcome: Progressing   Fall precautions in place. Call light and belongings within reach.     Problem: Skin Integrity  Goal: Skin integrity is maintained or improved  Outcome: Progressing   Pt repositioned every 2 hours and as needed. Pt checked for incontinence during hourly rounding. Heel, sacral, and elbow mepilex's applied to skin. Bed bath and oral care provided during shift.    Patient is not progressing towards the following goals:N/A

## 2022-11-06 NOTE — PROGRESS NOTES
Hospital Medicine Daily Progress Note    Date of Service  11/6/2022    Chief Complaint  Jacqueline Webb is a 56 y.o. female admitted 11/2/2022 with altered mental status    Hospital Course  Jacqueline Webb is a 56 y.o. female who presented 11/2/2022 with a history of type 2 diabetes, high cholesterol, vision loss who presented with altered mental status.  ED patient was found to have a left-sided gaze preference, NIH 25.  Subsequent MRI noted with left frontal insular temporal infarct.  Evaluated by neurology recommended continue aspirin, Lipitor and follow-up with outpatient with [facility].  PT/OT recommending postacute placement.         Interval Problem Update  11/4/2022  Vitals remained stable  Patient is aphasic.  She follows for command.  Labs unremarkable  On feeding tube.  SLP following  Noted to have 10 mm chest nodule on abdominal x-ray.  Will get CT chest for further evaluation.  Likely need outpatient follow-up with pulmonology for serial CT scan.   assisting with placement    11/5/2022  Vitals remained stable  Remained aphasic.  Following command. moving all extremities  Labs reviewed  Blood glucose level remain elevated.  Noted significantly elevated A1c  Insulin regimen adjusted  Palliative following for goals of care discussion   assisting with placement    11/6/2022  Vitals remained stable  Patient continues to remain aphasic  Labs unremarkable  Insulin regimen adjusted  Need placement   assisting    I have discussed this patient's plan of care and discharge plan at IDT rounds today with Case Management, Nursing, Nursing leadership, and other members of the IDT team.    Consultants/Specialty  neurology    Code Status  Full Code    Disposition  Patient is not medically cleared for discharge.   Anticipate discharge to to skilled nursing facility.  I have placed the appropriate orders for post-discharge needs.    Review of Systems  Review of Systems   Unable  to perform ROS: Acuity of condition      Physical Exam  Temp:  [36.2 °C (97.2 °F)-36.7 °C (98.1 °F)] 36.5 °C (97.7 °F)  Pulse:  [82-88] 86  Resp:  [16-18] 16  BP: (105-144)/(70-80) 134/78  SpO2:  [87 %-99 %] 94 %    Physical Exam  Constitutional:       General: She is not in acute distress.  HENT:      Head: Normocephalic and atraumatic.      Right Ear: Tympanic membrane normal.      Left Ear: Tympanic membrane normal.      Nose: Nose normal.      Mouth/Throat:      Mouth: Mucous membranes are moist.   Eyes:      Extraocular Movements: Extraocular movements intact.      Pupils: Pupils are equal, round, and reactive to light.   Cardiovascular:      Rate and Rhythm: Normal rate and regular rhythm.      Pulses: Normal pulses.   Pulmonary:      Effort: Pulmonary effort is normal. No respiratory distress.   Abdominal:      General: Abdomen is flat. There is no distension.   Musculoskeletal:      Cervical back: Normal range of motion.      Right lower leg: No edema.      Left lower leg: No edema.   Skin:     General: Skin is warm.   Neurological:      Mental Status: She is alert. Mental status is at baseline. She is disoriented.      Comments: Patient is aphasic.  Noted right upper extremity weakness 1/5, moving fingers .  Follows command all other extremities 5 out of 5     Psychiatric:         Mood and Affect: Mood normal.       Fluids    Intake/Output Summary (Last 24 hours) at 11/6/2022 1110  Last data filed at 11/6/2022 0800  Gross per 24 hour   Intake 840 ml   Output 300 ml   Net 540 ml         Laboratory  Recent Labs     11/04/22  0048   WBC 9.9   RBC 4.38   HEMOGLOBIN 12.9   HEMATOCRIT 39.9   MCV 91.1   MCH 29.5   MCHC 32.3*   RDW 41.3   PLATELETCT 331   MPV 9.5       Recent Labs     11/04/22  0048   SODIUM 142   POTASSIUM 4.1   CHLORIDE 105   CO2 22   GLUCOSE 193*   BUN 47*   CREATININE 0.85   CALCIUM 9.4                         Imaging  CT-CHEST (THORAX) WITH   Final Result      1.  No evidence of pulmonary  nodule. Questioned pulmonary nodule on abdominal film due to callus formation from subacute/chronic right anterior fourth rib fracture.      2.  Porcelain gallbladder.      Fleischner Society pulmonary nodule recommendations:   Not Applicable         DX-ABDOMEN FOR TUBE PLACEMENT   Final Result      Enteric tube with catheter tip in the left upper quadrant consistent with intragastric location.      MR-BRAIN-W/O   Final Result         Acute infarct in the left frontal, insular and left medial temporal region. Small acute infarct also noted in the left internal capsule anterior limb and adjacent basal ganglia.      Occlusion of the left distal cervical and petrous ICA.      Remote right parietal infarct.      Age-related volume loss and chronic microvascular ischemic changes.      DX-ABDOMEN FOR TUBE PLACEMENT   Final Result         1.  Nonspecific bowel gas pattern in the upper abdomen.   2.  Nasogastric tube tip terminates overlying the expected location of the pylorus or first duodenal segment.   3.  Atherosclerosis      DX-ABDOMEN FOR TUBE PLACEMENT   Final Result         1.  Nonspecific bowel gas pattern in the upper abdomen.   2.  Nasogastric tube with side-port at the gastroesophageal junction, recommend advancement.   3.  Right midlung nodular density, recommend follow-up CT chest for further characterization.   4.  Atherosclerosis      These findings were discussed with the patient's clinician, Dr. Puente, on 11/4/2022 7:25 AM.      EC-ECHOCARDIOGRAM COMPLETE W/O CONT   Final Result      DX-CHEST-PORTABLE (1 VIEW)   Final Result      No acute cardiac or pulmonary abnormalities are identified.      CT-CTA NECK WITH & W/O-POST PROCESSING   Final Result      1.  Occluded LEFT extracranial internal carotid artery. This could be due to thromboembolic disease or dissection.   2.  Estimated 50-75% stenosis of the proximal RIGHT internal carotid artery   3.  Thyroid nodules      CT-CEREBRAL PERFUSION ANALYSIS   Final  Result      1.  Moderate sized completed area of infarction noted in the left frontal parasylvian region.      CT-CTA HEAD WITH & W/O-POST PROCESS   Final Result      1.  Occlusion of the imaged extracranial LEFT internal carotid artery through the cavernous segment with reconstitution of the supraclinoid segment   2.  LEFT M3 branch occlusion            Findings were communicated with and acknowledged by Dr. Sultana via [employer] on 11/2/2022 2:34 PM.      CT-HEAD W/O   Final Result      1.  Moderate sized acute area of infarction involving the left frontal parasylvian region.      2.  Smaller encephalomalacia thinning to the cortex in the right parietal-occipital region.      3.  Mild age-related cerebral atrophy.                Assessment/Plan  * Acute CVA (cerebrovascular accident) (HCC)- (present on admission)  Assessment & Plan  MRI noted with left frontal insular temporal infarct  Continue on aspirin/Lipitor  Evaluated by neurology  PT/OT recommending postacute placement   assisting    Lung nodule seen on imaging study  Assessment & Plan  Noted to have 10 mm chest nodule on abdominal x-ray.  Will get CT chest for further evaluation.  Likely need outpatient follow-up with pulmonology for serial CT scan.    Vision loss  Assessment & Plan  In setting of acute stroke      HLD (hyperlipidemia)  Assessment & Plan  Family report the patient has a history of high cholesterol.  Lipid panel ordered.    Type 2 diabetes mellitus (HCC)  Assessment & Plan  Family report the patient has a history of type 2 diabetes and has been admitted for DKA in the past.  On insulin regimen       VTE prophylaxis: SCDs/TEDs and enoxaparin ppx    I have performed a physical exam and reviewed and updated ROS and Plan today (11/6/2022). In review of yesterday's note (11/5/2022), there are no changes except as documented above.

## 2022-11-07 NOTE — DISCHARGE PLANNING
Please review the consult from Dr. Beal regarding post acute recommendations.  TCC will no longer follow.  Please reach out to myself with any interval changes/questions.

## 2022-11-07 NOTE — PROGRESS NOTES
Monitor summary: SR, HR 83-92, ND 0.13, QRS 0.09, QT 0.36 with (R)PAC per strip from monitor room

## 2022-11-07 NOTE — THERAPY
Speech Language Pathology   Fiberoptic Endoscopic Evaluation of Swallow     Patient Name: Jacqueline Webb  AGE:  56 y.o., SEX:  female  Medical Record #: [id number]  Today's Date: 11/7/2022     Precautions: Fall Risk, Nasogastric Tube, Swallow Precautions ( See Comments)  Comments: right rocio:  aphasic    HPI: 56 y.o. female who presented 11/2/2022 with altered mental status PMHx: type 2 diabetes, high cholesterol, vision loss, breast cancer CMHx: Acute CVA, HLD. No SLP [facility]x [facility]     CXR 11/2:  No acute cardiac or pulmonary abnormalities are identified.     CT-Head 11/2:  1.  Moderate sized acute area of infarction involving the left frontal parasylvian region. 2.  Smaller encephalomalacia thinning to the cortex in the right parietal-occipital region. 3.  Mild age-related cerebral atrophy.    MRI Brain 11/4:  Acute infarct in the left frontal, insular and left medial temporal region. Small acute infarct also noted in the left internal capsule anterior limb and adjacent basal ganglia.  2. Occlusion of the left distal cervical and petrous ICA.     3. Remote right parietal infarct.     4. Age-related volume loss and chronic microvascular ischemic changes.      Current Method of Nutrition   NPO until cleared by speech pathology, NGT/Cortrak      Pertinent Information:  Affect/Behavior: Appropriate, Calm  Oxygen Requirements:  1 L Nasal Cannula  Feeding Tube: NGT in situ to right nare  Dentition: Good  Factor(s) Affecting Performance: Impaired command following, expressive and receptive aphasia: non verbal    Discussed the risks, benefits, and alternatives of the FEES procedure. Patient nodded her head in acknowledgement and agreed to proceed.      Assessment  Flexible Endoscopic Evaluation of Swallowing (FEES) completed at bedside today. The endoscope was passed transnasally via left nare to evaluate the anatomy and physiology of swallowing. Pt tolerated the procedure with no apparent distress.    Anatomic Findings: Slight  right side asymmetry: small vallecular space  Vocal Fold Motion: Bilateral movement noted with cough, but right side appears sluggish at times  Secretion Management: WNL--very trace amounts at the level of the post cricoid space  PO trials: ice chips, thin liquids, mildly thick liquids, liquidized, puree      Consistency PAS Score Timing Comments   Ice Chips 5 Pre swallow and During swallow inferred  PAS 4 x1  PAS 5 x1  NO swallow at all x2   Thin Liquid 8 Pre swallow and Post swallow  PAS 8 via teaspoon of thins before the swallow x 3 with aspiration after x 2 from residue at the level of the UES--aspiration visible on the anterior tracheal shelf  PAS 1 via 1/2 teaspoon bolus x1     Mildly Thick Liquid 8 Pre swallow, During swallow (inferred), and Post swallow PAS 5 x 2 via tsp before the swallow  PAS 6 x 1 via cup before swallow  PAS 8 x2 via cup suspected during the swallow and also post swallow from residue at the posterior glottic region   Liquidized 5 During swallow (inferred)  PAS 1 in 2 trials  PAS 5 during the swallow x2 tirals   Puree 5 Pre swallow and During swallow (inferred)  PAS 5 on 4/4 trials of pudding before and suspected during the swallow     Penetration-Aspiration Scale (PAS)  1     No contrast enters airway  2     Contrast enters the airway, remains above the vocal folds, and is ejected from the airway (not seen in the airway at the end of the swallow).  3     Contrast enters the airway, remains above the vocal folds, and is not ejected from the airway (is seen in the airway after the swallow).  4     Contrast enters the airway, contacts the vocal folds, and is ejected from the airway.  5     Contrast enters the airway, contacts the vocal folds, and is not ejected from the airway  6     Contrast enters the airway, crosses the plane of the vocal folds, and is ejected from the airway.  7     Contrast enters the airway, crosses the plane of the vocal folds, and is not ejected from the airway  despite effort.  8     Contrast enters the airway, crosses the plane of the vocal folds, is not ejected from the airway and there is no response to aspiration.      Oral phase:  Suspect decreased A-P bolus transit and poor lingual coordination for transport of bolus due to prolonged oral phase and what appeared to be oral holding of the bolus.  Patient often required gentle tactile support to provide closed lip posture as well as verbal cues to initiate swallow.    Pharyngeal phase:   Patient with inadequate bolus control, decreased laryngeal elevation, impaired epiglottic inversion, laryngeal vestibule closure, pharyngeal constriction and BoT retraction.  Deficits resulted spillage with delayed onset of swallow, mild pharyngeal residue in a ring like pattern (pudding and thin liquids) and some level of penetration and/or aspiration on all consistencies tested.  Due to her aphasia, she was not able to follow any commands for swallow strategies, and thus the small amount of aspirated material at the posterior commissure remained for multiple swallows until it eventually cleared.     Clinical Impressions:    The pt presents with a moderate-severe oropharyngeal swallow, likely acute related to CVA.  She is currently not at the level to initiate a PO diet and continuation of NPO with NGT is recommended for now.  Patient would benefit from aggressive dysphagia therapy to increase frequency of swallows and improve overall integrity of the swallow musculature for progression to some level of PO alimentation with modified diet. Would like to repeat diagnostic evaluation in ~1 week before making determination for need for a more permanent source of artificial nutrition.    Recommendations  1)  NPO with continuation of NGT  for now  2) OK for ~5 single ice chips per hour with RN staff to minimize xerostomia   3)  Swallowing Instructions & Precautions:   4)  Medication: Non Oral   5)  Oral Care: Q2h  6)  SLP to follow for  dysphagia and aphasia therapy      Plan    Recommend Speech Therapy 5 times per week until therapy goals are met for the following treatments:  Dysphagia Training, Comprehension Training, Expression Training, Reading Training, Writing Training, AAC Training / Development, and Patient / Family / Caregiver Education.    Discharge Recommendations: Recommend post-acute placement for additional speech therapy services prior to discharge home    Subjective    Pt nodded yes in response to wanting to proceed with swallowing study     Objective       11/07/22 1045   Precautions   Precautions Fall Risk;Nasogastric Tube;Swallow Precautions ( See Comments)   Comments right rocio:  aphasic   Prior Level Of Function   Communication Unknown   Swallow Unknown   Dentures None   Hearing Within Functional Limits   Patient's Primary Language English   FEES Evaluation Prior To Procedure   Onset Date Of Dysphagia 11/02/22   Dysphagia Symptoms Warranting Video Swallow concerns for aspiration:  new CVA   Procedure Performed While Patient Sitting In Bed   A Flexible Endoscope Was Introduced Transnasally In The Left Nare   Compensatory Strategies Attempted   Compensatory Strategies Attempted No   Additional Comments not able to follow   Dysphagia Rating   Nutritional Liquid Intake Rating Scale Nothing by mouth   Nutritional Food Intake Rating Scale Tube dependent with minimal attempts of oral intake  (single ice chips with RN only--sitting at 90* angle)   Problem List   Problem List Dysphagia;Receptive Language Deficit;Expressive Language Deficit;Aphasia   Evaluation Recommendations   Swallow Evaluations Recommendations (Yes / No) Yes   Diet / Liquid Recommendation NPO;Pre-Feeding Trials with SLP Only   Medication Administration  Via Gastric Tube   Short Term Goals   Short Term Goal # 1 modified 11/7:  Patient will consume PO trials of single ice chips and 1/2 tsp boluses of MTL with SLP only with no overt S/Sx of aspiration noted   Education  Group   Education Provided Dysphagia   Dysphagia Patient Response Patient;Acceptance;Explanation;Action Demonstration;Reinforcement Needed;No Learning Evidence;Family  (brother in at end of session)   Additional Comments ongoing reinforcement needed   Anticipated Discharge Needs   Discharge Recommendations Recommend post-acute placement for additional speech therapy services prior to discharge home   Therapy Recommendations Upon DC Dysphagia Training;Expression Training;Comprehension Training;Writing Training;Reading Training;AAC Training / Development;Community Re-Integration;Patient / Family / Caregiver Education

## 2022-11-07 NOTE — CARE PLAN
The patient is Stable - Low risk of patient condition declining or worsening    Shift Goals  Clinical Goals: safety, increased communication  Patient Goals: DALE  Family Goals: Talk to case management    Progress made toward(s) clinical / shift goals:  Pt remains nonverbal, but nodding/gesturing appropriately. Updated family on POC, set up meeting with palliative, case management, and MD. Code status updated to DNAR/DNI. Pt remains confused and only oriented to self. Q2 turns in place, fall, seizure and aspiration precautions in place. Hourly rounding continues.     Patient is not progressing towards the following goals:

## 2022-11-07 NOTE — DISCHARGE PLANNING
Care Transition Team Assessment      Met with brother, Gilberto, and sister, Karuna, at bedside.      Pt has not been able to see for sometime at least 2-3 years. She has memorized the layout of house.  She has no insurance and has unable to see anyone.      They all live together in one single story home w/3 steps into home.  The sister and brother try to help her but unsure how.      They would like her to go to a Long term placement to live but she doesn't have any insurance.  Also, would like her to be at [facility] because it is close to them.      Have sent PFA message this [facility]. To see if she can qualify for Medicaid.  Gilberto, brother told me she had it 3-5 years ago.      Information Source  Orientation Level: Disoriented to place, Disoriented to time, Disoriented to situation, Oriented to person  Information Given By: Relative (sister, Collete)  Informant's Name: Collette, sister & brother, Gilberto  Who is responsible for making decisions for patient? : Other  Name(s) of Primary Decision Maker: sister & brother    Readmission Evaluation  Is this a readmission?: No    Elopement Risk  Legal Hold: No  Ambulatory or Self Mobile in Wheelchair: No-Not an Elopement Risk  Elopement Risk: Not at Risk for Elopement    Interdisciplinary Discharge Planning  Lives with - Patient's Self Care Capacity: Other (Comments) (Her brother and sisters take care of her.  They all live together)  Support Systems:  / , Family Member(s)  Housing / Facility: 1 Story House (3 steps into home)  Mobility Issues: No  Prior Services: Unable To Determine At This Time  Durable Medical Equipment: Not Applicable    Discharge Preparedness  What is your plan after discharge?: Other (comment) (Family wants her to have placed in a LT home due to they don't know how to take care of her.)  What are your discharge supports?: Sibling  Prior Functional Level: Ambulatory, Independent with Activities of Daily Living, Needs Assist  with Activities of Daily Living, Needs Assist with Medication Management    Functional Assesment  Prior Functional Level: Ambulatory, Independent with Activities of Daily Living, Needs Assist with Activities of Daily Living, Needs Assist with Medication Management    Finances  Financial Barriers to Discharge: Yes (She has no insurance)    Vision / Hearing Impairment  Right Eye Vision: Blind  Left Eye Vision: Blind  Hearing Impairment : No    Domestic Abuse  Have you ever been the victim of abuse or violence?: No    Psychological Assessment  History of Substance Abuse: None  History of Psychiatric Problems: Yes (Depression the sister and brother think and untreated.)    Anticipated Discharge Information  Discharge Disposition: D/T to SNF with Medicare cert in anticipation of skilled care (03)

## 2022-11-07 NOTE — PROGRESS NOTES
Hospital Medicine Daily Progress Note    Date of Service  11/7/2022    Chief Complaint  Jacqueline Webb is a 56 y.o. female admitted 11/2/2022 with altered mental status    Hospital Course  Jacqueline Webb is a 56 y.o. female who presented 11/2/2022 with a history of type 2 diabetes, high cholesterol, vision loss who presented with altered mental status.  ED patient was found to have a left-sided gaze preference, NIH 25.  Subsequent MRI noted with left frontal insular temporal infarct.  Evaluated by neurology recommended continue aspirin, Lipitor and follow-up with outpatient [facility].  PT/OT recommending postacute placement.         Interval Problem Update  11/4/2022  Vitals remained stable  Patient is aphasic.  She follows for command.  Labs unremarkable  On feeding tube.  SLP following  Noted to have 10 mm chest nodule on abdominal x-ray.  Will get CT chest for further evaluation.  Likely need outpatient follow-up with pulmonology for serial CT scan.   assisting with placement    11/5/2022  Vitals remained stable  Remained aphasic.  Following command. moving all extremities  Labs reviewed  Blood glucose level remain elevated.  Noted significantly elevated A1c  Insulin regimen adjusted  Palliative following for goals of care discussion   assisting with placement    11/6/2022  Vitals remained stable  Patient continues to remain aphasic  Labs unremarkable  Insulin regimen adjusted  Need placement   assisting    11/7/2022  Vitals remained stable.  Significant aphasia with right upper arm weakness  Labs unremarkable  Blood glucose level elevated.  Insulin regimen adjusted  Need placement.   assisting  Palliative following  Updates given and plan of care discussed with patient's family who was at bedside.      I have discussed this patient's plan of care and discharge plan at IDT rounds today with Case Management, Nursing, Nursing leadership, and other  members of the IDT team.    Consultants/Specialty  neurology    Code Status  Full Code    Disposition  Patient is not medically cleared for discharge.   Anticipate discharge to to skilled nursing facility.  I have placed the appropriate orders for post-discharge needs.    Review of Systems  Review of Systems   Unable to perform ROS: Acuity of condition      Physical Exam  Temp:  [36.1 °C (97 °F)-36.8 °C (98.2 °F)] 36.7 °C (98.1 °F)  Pulse:  [82-91] 86  Resp:  [16] 16  BP: (122-157)/(71-78) 124/74  SpO2:  [94 %-97 %] 96 %    Physical Exam  Constitutional:       General: She is not in acute distress.  HENT:      Head: Normocephalic and atraumatic.      Right Ear: Tympanic membrane normal.      Left Ear: Tympanic membrane normal.      Nose: Nose normal.      Mouth/Throat:      Mouth: Mucous membranes are moist.   Eyes:      Extraocular Movements: Extraocular movements intact.      Pupils: Pupils are equal, round, and reactive to light.   Cardiovascular:      Rate and Rhythm: Normal rate and regular rhythm.      Pulses: Normal pulses.   Pulmonary:      Effort: Pulmonary effort is normal. No respiratory distress.   Abdominal:      General: Abdomen is flat. There is no distension.   Musculoskeletal:      Cervical back: Normal range of motion.      Right lower leg: No edema.      Left lower leg: No edema.   Skin:     General: Skin is warm.   Neurological:      Mental Status: She is alert. Mental status is at baseline. She is disoriented.      Comments: Patient is aphasic.  Noted right upper extremity weakness 1/5, moving fingers .  Follows command all other extremities 5 out of 5     Psychiatric:         Mood and Affect: Mood normal.       Fluids    Intake/Output Summary (Last 24 hours) at 11/7/2022 1411  Last data filed at 11/7/2022 0401  Gross per 24 hour   Intake 120 ml   Output --   Net 120 ml         Laboratory                                Imaging  CT-CHEST (THORAX) WITH   Final Result      1.  No evidence of  pulmonary nodule. Questioned pulmonary nodule on abdominal film due to callus formation from subacute/chronic right anterior fourth rib fracture.      2.  Porcelain gallbladder.      Fleischner Society pulmonary nodule recommendations:   Not Applicable         DX-ABDOMEN FOR TUBE PLACEMENT   Final Result      Enteric tube with catheter tip in the left upper quadrant consistent with intragastric location.      MR-BRAIN-W/O   Final Result         Acute infarct in the left frontal, insular and left medial temporal region. Small acute infarct also noted in the left internal capsule anterior limb and adjacent basal ganglia.      Occlusion of the left distal cervical and petrous ICA.      Remote right parietal infarct.      Age-related volume loss and chronic microvascular ischemic changes.      DX-ABDOMEN FOR TUBE PLACEMENT   Final Result         1.  Nonspecific bowel gas pattern in the upper abdomen.   2.  Nasogastric tube tip terminates overlying the expected location of the pylorus or first duodenal segment.   3.  Atherosclerosis      DX-ABDOMEN FOR TUBE PLACEMENT   Final Result         1.  Nonspecific bowel gas pattern in the upper abdomen.   2.  Nasogastric tube with side-port at the gastroesophageal junction, recommend advancement.   3.  Right midlung nodular density, recommend follow-up CT chest for further characterization.   4.  Atherosclerosis      These findings were discussed with the patient's clinician, Dr. Puente, on 11/4/2022 7:25 AM.      EC-ECHOCARDIOGRAM COMPLETE W/O CONT   Final Result      DX-CHEST-PORTABLE (1 VIEW)   Final Result      No acute cardiac or pulmonary abnormalities are identified.      CT-CTA NECK WITH & W/O-POST PROCESSING   Final Result      1.  Occluded LEFT extracranial internal carotid artery. This could be due to thromboembolic disease or dissection.   2.  Estimated 50-75% stenosis of the proximal RIGHT internal carotid artery   3.  Thyroid nodules      CT-CEREBRAL PERFUSION ANALYSIS    Final Result      1.  Moderate sized completed area of infarction noted in the left frontal parasylvian region.      CT-CTA HEAD WITH & W/O-POST PROCESS   Final Result      1.  Occlusion of the imaged extracranial LEFT internal carotid artery through the cavernous segment with reconstitution of the supraclinoid segment   2.  LEFT M3 branch occlusion            Findings were communicated with and acknowledged by Dr. Sultana via Vo[employer] Me on 11/2/2022 2:34 PM.      CT-HEAD W/O   Final Result      1.  Moderate sized acute area of infarction involving the left frontal parasylvian region.      2.  Smaller encephalomalacia thinning to the cortex in the right parietal-occipital region.      3.  Mild age-related cerebral atrophy.                Assessment/Plan  * Acute CVA (cerebrovascular accident) (HCC)- (present on admission)  Assessment & Plan  MRI noted with left frontal insular temporal infarct  Continue on aspirin/Lipitor  Evaluated by neurology  PT/OT recommending postacute placement   assisting    Lung nodule seen on imaging study  Assessment & Plan  Noted to have 10 mm chest nodule on abdominal x-ray.  No evidence of pulmonary nodule on Ct chest     Vision loss  Assessment & Plan  In setting of acute stroke      HLD (hyperlipidemia)  Assessment & Plan  Family report the patient has a history of high cholesterol.  Lipid panel ordered.    Type 2 diabetes mellitus (HCC)  Assessment & Plan  Family report the patient has a history of type 2 diabetes and has been admitted for DKA in the past.  On insulin regimen       VTE prophylaxis: SCDs/TEDs and enoxaparin ppx    I have performed a physical exam and reviewed and updated ROS and Plan today (11/7/2022). In review of yesterday's note (11/6/2022), there are no changes except as documented above.

## 2022-11-08 PROBLEM — E04.1 THYROID NODULE: Status: ACTIVE | Noted: 2022-01-01

## 2022-11-08 PROBLEM — I65.29 CAROTID STENOSIS: Status: ACTIVE | Noted: 2022-01-01

## 2022-11-08 NOTE — PALLIATIVE CARE
Palliative Care follow-up  PC RN met with pt's siblings, Gilberto and Karuna, in the family room. Again discussed the issue of code status. All family is in agreement now with DNAR/DNI status. MD notified. POLST completed at this encounter with family selecting DNAR and selective treatment (no intubation). This was signed by patient's sister, Karuna, and MD. The original was returned the patient's room to be sent with her on discharge. A copy will be scanned into Epic. To view this, please hover over code status.     Dr. Puente provided clinical update to Collete and Scott. They have no further questions at this time.     Updated: MD and RN    Plan: Will continue to monitor clinical picture and support pt/family.    Thank you for allowing Palliative Care to support this patient and family. Contact [phone] for additional assistance, change in patient status, or with any questions/concerns.

## 2022-11-08 NOTE — CARE PLAN
The patient is Stable - Low risk of patient condition declining or worsening    Shift Goals  Clinical Goals: Safety, increased communication  Patient Goals: DALE  Family Goals: Comfortable overnight    Progress made toward(s) clinical / shift goals:    Problem: Skin Integrity  Goal: Skin integrity is maintained or improved  Outcome: Progressing   Q2 turns in place, waffle overlay used, mepelex over bony prominences, and skin protectant in use   Problem: Fall Risk  Goal: Patient will remain free from falls  Outcome: Progressing   Appropriate fall precautions are in place. Bed alarm is on at all times. Staff present for bathroom needs.   Problem: Neuro Status  Goal: Neuro status will remain stable or improve  Outcome: Progressing   Q4hr neuro checks. Neuro status remains stable.      Patient is not progressing towards the following goals:

## 2022-11-08 NOTE — PROGRESS NOTES
Hospital Medicine Daily Progress Note    Date of Service  11/8/2022    Chief Complaint  Jacqueline Webb is a 56 y.o. female admitted 11/2/2022 with altered mental status    Hospital Course  Jacqueline Webb is a 56 y.o. female who presented 11/2/2022 with a history of type 2 diabetes, high cholesterol, vision loss who presented with altered mental status.  ED patient was found to have a left-sided gaze preference, NIH 25.  Subsequent MRI noted with left frontal insular temporal infarct.  Evaluated by neurology recommended continue aspirin, Lipitor and follow-up with [facility].  PT/OT recommending postacute placement.         Interval Problem Update    Patient is afebrile on 1 L nasal cannula   blood pressure stable      I have discussed this patient's plan of care and discharge plan at IDT rounds today with Case Management, Nursing, Nursing leadership, and other members of the IDT team.    Consultants/Specialty  neurology    Code Status  DNAR/DNI    Disposition  Patient is not medically cleared for discharge.   Anticipate discharge to to skilled nursing facility.  I have placed the appropriate orders for post-discharge needs.    Review of Systems  Review of Systems   Unable to perform ROS: Medical condition      Physical Exam  Temp:  [36.3 °C (97.3 °F)-36.8 °C (98.2 °F)] 36.7 °C (98.1 °F)  Pulse:  [84-95] 89  Resp:  [16-18] 17  BP: (110-134)/(65-82) 110/65  SpO2:  [92 %-98 %] 98 %    Physical Exam  Vitals and nursing note reviewed.   Constitutional:       General: She is not in acute distress.  HENT:      Head: Normocephalic and atraumatic.      Nose: Nose normal. No rhinorrhea.      Comments: Ng in place     Mouth/Throat:      Pharynx: No oropharyngeal exudate or posterior oropharyngeal erythema.   Eyes:      General: No scleral icterus.        Right eye: No discharge.         Left eye: No discharge.   Cardiovascular:      Rate and Rhythm: Normal rate and regular rhythm.      Heart sounds:  Normal heart sounds. No murmur heard.    No friction rub. No gallop.   Pulmonary:      Effort: Pulmonary effort is normal. No respiratory distress.      Breath sounds: Normal breath sounds. No stridor. No wheezing, rhonchi or rales.   Chest:      Chest wall: No tenderness.   Abdominal:      General: Bowel sounds are normal. There is no distension.      Palpations: Abdomen is soft. There is no mass.      Tenderness: There is no abdominal tenderness. There is no rebound.   Musculoskeletal:         General: No swelling or tenderness.      Cervical back: Neck supple. No rigidity.   Skin:     General: Skin is warm and dry.      Coloration: Skin is not cyanotic or jaundiced.      Nails: There is no clubbing.   Neurological:      Mental Status: She is alert.      Cranial Nerves: Cranial nerve deficit present.      Motor: Weakness present.      Comments: Patient with expressive aphasia  Right upper extremity weakness 1/5 right lower extremity weakness 1-2/5   Psychiatric:         Mood and Affect: Mood normal.         Behavior: Behavior normal.       Fluids    Intake/Output Summary (Last 24 hours) at 11/8/2022 1441  Last data filed at 11/8/2022 0800  Gross per 24 hour   Intake 90 ml   Output --   Net 90 ml         Laboratory                                Imaging  CT-CHEST (THORAX) WITH   Final Result      1.  No evidence of pulmonary nodule. Questioned pulmonary nodule on abdominal film due to callus formation from subacute/chronic right anterior fourth rib fracture.      2.  Porcelain gallbladder.      Fleischner Society pulmonary nodule recommendations:   Not Applicable         DX-ABDOMEN FOR TUBE PLACEMENT   Final Result      Enteric tube with catheter tip in the left upper quadrant consistent with intragastric location.      MR-BRAIN-W/O   Final Result         Acute infarct in the left frontal, insular and left medial temporal region. Small acute infarct also noted in the left internal capsule anterior limb and adjacent  basal ganglia.      Occlusion of the left distal cervical and petrous ICA.      Remote right parietal infarct.      Age-related volume loss and chronic microvascular ischemic changes.      DX-ABDOMEN FOR TUBE PLACEMENT   Final Result         1.  Nonspecific bowel gas pattern in the upper abdomen.   2.  Nasogastric tube tip terminates overlying the expected location of the pylorus or first duodenal segment.   3.  Atherosclerosis      DX-ABDOMEN FOR TUBE PLACEMENT   Final Result         1.  Nonspecific bowel gas pattern in the upper abdomen.   2.  Nasogastric tube with side-port at the gastroesophageal junction, recommend advancement.   3.  Right midlung nodular density, recommend follow-up CT chest for further characterization.   4.  Atherosclerosis      These findings were discussed with the patient's clinician, Dr. Puente, on 11/4/2022 7:25 AM.      EC-ECHOCARDIOGRAM COMPLETE W/O CONT   Final Result      DX-CHEST-PORTABLE (1 VIEW)   Final Result      No acute cardiac or pulmonary abnormalities are identified.      CT-CTA NECK WITH & W/O-POST PROCESSING   Final Result      1.  Occluded LEFT extracranial internal carotid artery. This could be due to thromboembolic disease or dissection.   2.  Estimated 50-75% stenosis of the proximal RIGHT internal carotid artery   3.  Thyroid nodules      CT-CEREBRAL PERFUSION ANALYSIS   Final Result      1.  Moderate sized completed area of infarction noted in the left frontal parasylvian region.      CT-CTA HEAD WITH & W/O-POST PROCESS   Final Result      1.  Occlusion of the imaged extracranial LEFT internal carotid artery through the cavernous segment with reconstitution of the supraclinoid segment   2.  LEFT M3 branch occlusion            Findings were communicated with and acknowledged by Dr. Sultana via Voalte Me on 11/2/2022 2:34 PM.      CT-HEAD W/O   Final Result      1.  Moderate sized acute area of infarction involving the left frontal parasylvian region.      2.   Smaller encephalomalacia thinning to the cortex in the right parietal-occipital region.      3.  Mild age-related cerebral atrophy.                Assessment/Plan  * Acute CVA (cerebrovascular accident) (HCC)- (present on admission)  Assessment & Plan  MRI noted with left frontal insular temporal infarct    Continue aspirin atorvastatin  Blood pressure control  Evaluated by neurology with recommendation for Zio patch on discharge    PT/OT recommending postacute placement  Discussed with case management patient with no medical insurance    Thyroid nodule  Assessment & Plan  Noted on CTA of neck will need outpatient follow-up  Check TSH    Carotid stenosis  Assessment & Plan  Aspirin statin  Outpatient monitoring and follow-up    Lung nodule seen on imaging study  Assessment & Plan  Noted to have 10 mm chest nodule on abdominal x-ray.  No evidence of pulmonary nodule on Ct chest     Vision loss  Assessment & Plan  In setting of acute stroke      HLD (hyperlipidemia)  Assessment & Plan  Family report the patient has a history of high cholesterol.  Lipid panel ordered.    Type 2 diabetes mellitus (HCC)  Assessment & Plan  With hyperglycemia   increase Lantus and continue sliding scale insulin monitor CBGs       VTE prophylaxis: SCDs/TEDs and enoxaparin ppx    I have performed a physical exam and reviewed and updated ROS and Plan today (11/8/2022). In review of yesterday's note (11/7/2022), there are no changes except as documented above.

## 2022-11-08 NOTE — CARE PLAN
The patient is Stable - Low risk of patient condition declining or worsening    Shift Goals  Clinical Goals: safety, increased communication  Patient Goals: DALE  Family Goals: Updates on night    Progress made toward(s) clinical / shift goals:  Pt remains nonverbal and confused. Increased fidgeting over night. Removed telemetry leads multiple times. Pt continues to remove PureWick and has episodes of incontinence requireing multiple linen changes. Fall, seizure, and aspiration precautions in place. Hourly rounding continues.       Problem: Optimal Care of the Stroke Patient  Goal: Optimal emergency care for the stroke patient  Outcome: Progressing     Problem: Hemodynamic Monitoring  Goal: Patient's hemodynamics, fluid balance and neurologic status will be stable or improve  Outcome: Progressing     Problem: Neuro Status  Goal: Neuro status will remain stable or improve  Outcome: Progressing     Problem: Respiratory - Stroke Patient  Goal: Patient will achieve/maintain optimum respiratory rate/effort  Outcome: Progressing     Problem: Dysphagia  Goal: Dysphagia will improve  Outcome: Progressing        Patient is not progressing towards the following goals:

## 2022-11-08 NOTE — DIETARY
Nutrition Services Brief Update:    Problem: Nutritional:  Goal: Nutrition support tolerated and meeting greater than 85% of estimated needs  Outcome: Met    TF Diabetisource AC is running at goal rate 50 ml/hr per flowsheets. FEES on 11/8, SLP recommends NPO.    RD continues to follow.

## 2022-11-08 NOTE — THERAPY
Physical Therapy   Daily Treatment     Patient Name: Jacqueline Webb  Age:  56 y.o., Sex:  female  Medical Record #: [id number]  Today's Date: 11/8/2022     Precautions  Precautions: Fall Risk;Nasogastric Tube;Swallow Precautions ( See Comments)  Comments: right rocio, aphasic    Assessment    Pt greeted, req'ing total assist with jose cares and linen change. Pt req'd MaxA to come to EOB, log roll, and scooting. Pt minimally performing LAQ and marching sitting EOB but able to follow cues, pt demo'd difficultly terminating tasks. Pt unable to fully reach given targets w/ UEs but correct direction attempted despite family's reports of pt's blindness. Pt req'd MaxA B HHA to stand EOB, unable to lift head, tolerated standing for 2 min. Pt was unable to terminate task of standing, facilitation at hips req'd to sit. Pt continues to benefit from skilled PT to improve gross motor control and functional mobility.     Plan    Continue current treatment plan.    DC Equipment Recommendations: Unable to determine at this time  Discharge Recommendations: Recommend post-acute placement for additional physical therapy services prior to discharge home       11/08/22 1102   Cognition    Level of Consciousness Responds to voice   Ability To Follow Commands 1 Step  (simple commands)   Safety Awareness Impaired   New Learning Impaired   Attention Impaired   Sequencing Impaired   Initiation Impaired   Comments flat affect   Other Treatments   Other Treatments Provided Pt unable to reach given targets w/ UE in sitting, but attempt was given. Difficulty terminating tasks.   Balance   Sitting Balance (Static) Poor -   Sitting Balance (Dynamic) Poor -   Standing Balance (Static) Trace +   Standing Balance (Dynamic) Dependent   Weight Shift Sitting Poor   Weight Shift Standing Absent   Skilled Intervention Postural Facilitation;Sequencing;Tactile Cuing;Verbal Cuing   Comments B HHA   Gait Analysis   Gait Level Of Assist Unable to Participate   Bed  Mobility    Supine to Sit Maximal Assist   Sit to Supine Maximal Assist   Scooting Maximal Assist   Rolling Maximum Assist to Lt.;Maximal Assist to Rt.   Skilled Intervention Sequencing;Tactile Cuing;Verbal Cuing;Postural Facilitation   Comments total assist with jose cares   Functional Mobility   Sit to Stand Maximal Assist   Skilled Intervention Postural Facilitation;Facilitation;Sequencing;Tactile Cuing;Verbal Cuing   Comments Pt unable to lift head despite cues, unable to sit despite cues, facilitation given at hips to sit.   Short Term Goals    Short Term Goal # 1 Pt will perform supine <> sit with min A within 6 visits to progress bed mobility   Goal Outcome # 1 goal not met   Short Term Goal # 2 Pt will perform STS with min A and LRAD within 6 visits to progress OOB mobility   Goal Outcome # 2 Goal not met   Short Term Goal # 3 Pt will perform functional transfers with LRAD and min A within 6 visits within 6 visits to progress OOB mobility   Goal Outcome # 3 Goal not met   Short Term Goal # 4 Pt will ambulate 25 ft with LRAD and mod A within 6 visits to initiate gait training   Goal Outcome # 4 Goal not met   Supervising Physical Therapist (PTA Treatments Only)   Supervising Physical Therapist Sowmya Anthony

## 2022-11-09 NOTE — CARE PLAN
The patient is Stable - Low risk of patient condition declining or worsening    Shift Goals  Clinical Goals: monitor neuro status, up to chair  Patient Goals: khadra  Family Goals: khadra    Progress made toward(s) clinical / shift goals:    Problem: Neuro Status  Goal: Neuro status will remain stable or improve  Outcome: Progressing   Pt's neuro status is stable.     Patient is not progressing towards the following goals:  Pt is fatigue after working with pt/ot, unable to get pt up to chair today. Will try tomorrow.

## 2022-11-09 NOTE — THERAPY
Occupational Therapy  Daily Treatment     Patient Name: Jacqueline Webb  Age:  56 y.o., Sex:  female  Medical Record #: [id number]  Today's Date: 11/9/2022       Precautions: Fall Risk, Nasogastric Tube, Swallow Precautions ( See Comments)  Comments: right rocio, aphasic    Assessment    Pt seen for OT tx. Continues to be limited by decreased activity tolerance, balance deficits, inattention and RUE weakness impacting ability to complete ADLs and ADL transfers independently. Max A supine > sit, RUE w/ no active or purposeful movement. Pt w/ delayed responses but able to nod head yes given extra time. Will continue to benefit from OT services while in house.     Plan    Continue current treatment plan.    DC Equipment Recommendations: Unable to determine at this time  Discharge Recommendations: Recommend post-acute placement for additional occupational therapy services prior to discharge home       11/09/22 1001   Cognition    Cognition / Consciousness X   Safety Awareness Impaired   New Learning Impaired   Attention Impaired   Comments flat affect, non verbal, delayed responses   Active ROM Upper Body   Active ROM Upper Body  X   Comments RUE flaccid, LUE ROM limited by weakness   Strength Upper Body   Upper Body Strength  X   Comments RUE flaccid, LUE grossly weak   Balance   Sitting Balance (Static) Poor +   Sitting Balance (Dynamic) Poor   Standing Balance (Static) Trace +   Standing Balance (Dynamic) Dependent   Weight Shift Sitting Poor   Weight Shift Standing Absent   Bed Mobility    Supine to Sit Maximal Assist   Sit to Supine Maximal Assist   Activities of Daily Living   Grooming Maximal Assist;Seated   Upper Body Dressing Moderate Assist   Lower Body Dressing Maximal Assist   Toileting Total Assist   Functional Mobility   Sit to Stand Maximal Assist   Short Term Goals   Short Term Goal # 1 seated light grooming using left UE with min a.   Goal Outcome # 1 Goal not met   Short Term Goal # 2 Toilleting with min a  on BSC or in bathroom.   Goal Outcome # 2 Goal not met   Anticipated Discharge Equipment and Recommendations   DC Equipment Recommendations Unable to determine at this time   Discharge Recommendations Recommend post-acute placement for additional occupational therapy services prior to discharge home

## 2022-11-09 NOTE — THERAPY
"Speech Language Pathology  Daily Treatment     Patient Name: Jacqueline Webb  Age:  56 y.o., Sex:  female  Medical Record #: [id number]  Today's Date: 11/9/2022     Precautions  Precautions: Fall Risk, Nasogastric Tube, Swallow Precautions ( See Comments)  Comments: right rocio, aphasic    Assessment  Moderate stimulation, verbal and tactile, for wakefulness with pt's brother and nurs noting pt more sleepy this session. Pt is legally blind per her brother and requires 5 inch bold print near pt's face for her to see. This information was placed on pt's white board in her room. Nurs informed of vision deficit at baseline. Pt with 3 yes spont head nods during session. One brief whisper vocalization. No imitation  of simple vowels with model. Pt required max assist with hand over hand to trace the letters \"K\" and \"a\" to target letters in her name. No response to verbalize her name with model and large printed model of her name.     Pt's brother stating pt with probable depression before this admit but has not been treated for depression. Family is concerned that pt's baseline mood may affect her participation. Nurs informed. Pt provided with written and verbal educ regarding aphasia.       Plan  Lower level aphasia tx- target head nods or large bold printed Y/N with hand over hand assist. Target vocalization.   Continue current treatment plan.    Discharge Recommendations: Recommend post-acute placement for additional speech therapy services prior to discharge home     11/09/22 1150   Patient / Family Goals   Patient / Family Goal #1 Shook head Y for more ice chips   Goal #1 Outcome Goal not met   Short Term Goals   Short Term Goal # 2 Pt will demonstrate a reliable Y and N to express wants and needs x10 in a session.   Goal Outcome # 2  Goal not met   Short Term Goal # 4 Pt will follow 1-step commands given mod multisensory cues from SLP with 80% accuracy.   Goal Outcome  # 4 Progressing slower than expected   Short Term " Goal # 5 Pt will attempt vocalization x3 in a session given mod cues from SLP.   Goal Outcome  # 5 Progressing slower than expected

## 2022-11-09 NOTE — CARE PLAN
The patient is Stable - Low risk of patient condition declining or worsening    Shift Goals  Clinical Goals: Safety, increased communication  Patient Goals: DALE  Family Goals: DALE    Progress made toward(s) clinical / shift goals:    Problem: Neuro Status  Goal: Neuro status will remain stable or improve  Outcome: Progressing   Q4hr neuro checks. Neuro status remains stable.    Problem: Skin Integrity  Goal: Skin integrity is maintained or improved  Outcome: Progressing   Q2 turns in place, waffle overlay used, mepelex over bony prominences, and skin protectant in use     Patient is not progressing towards the following goals:

## 2022-11-09 NOTE — PROGRESS NOTES
Monitor Summary: SR 87-96, [place] 0.14, QRS 0.08, QT 0.33, with rare PVCs and rare couplets per strip from monitor room.

## 2022-11-09 NOTE — THERAPY
Physical Therapy   Daily Treatment     Patient Name: Jacqueline Webb  Age:  56 y.o., Sex:  female  Medical Record #: [id number]  Today's Date: 11/9/2022     Precautions  Precautions: Fall Risk;Nasogastric Tube;Swallow Precautions ( See Comments)  Comments: right rocio, aphasic    Assessment    Pt greeted and demo'd willingness to participate in therapy today. Pt req'd MaxA to come to EOB, able to walk legs to side of bed before coming to sitting. In sitting, pt using L UE on bed rail to maintain sitting balance. Pt able to follow cues to attempt reaching to UE targets, R UE flaccid but patient would reach with fingers, L UE has very limited flexion. Pt req'd MaxA to stand EOB, pt was able to minimally WB through legs. Pt is limited by decreased cognitive function, weakness, decreased motor control, and poor activity tolerance. Pt will continue to benefit from skilled PT to address deficits.    Plan    Continue current treatment plan.    DC Equipment Recommendations: Unable to determine at this time  Discharge Recommendations: Recommend post-acute placement for additional physical therapy services prior to discharge home         11/09/22 1029   Other Treatments   Other Treatments Provided Pt continues to demo difficultly reaching targets with UE, R UE flaccid.   Balance   Sitting Balance (Static) Poor -   Sitting Balance (Dynamic) Poor -   Standing Balance (Static) Trace +   Standing Balance (Dynamic) Dependent   Weight Shift Sitting Poor   Weight Shift Standing Absent   Skilled Intervention Sequencing;Tactile Cuing;Verbal Cuing;Postural Facilitation   Comments B HHA, w/ L UE pt held rail to maintain sitting balance   Gait Analysis   Gait Level Of Assist Unable to Participate   Bed Mobility    Supine to Sit Maximal Assist   Sit to Supine Maximal Assist   Scooting Maximal Assist   Rolling Maximal Assist to Rt.;Maximum Assist to Lt.   Skilled Intervention Verbal Cuing;Tactile Cuing;Sequencing;Postural Facilitation    Functional Mobility   Sit to Stand Maximal Assist   Mobility supine <> sit, STS x1   Skilled Intervention Postural Facilitation;Sequencing;Tactile Cuing;Verbal Cuing   Comments pt able to partially lift head, which was an improvement from previous day   Short Term Goals    Short Term Goal # 1 Pt will perform supine <> sit with min A within 6 visits to progress bed mobility   Goal Outcome # 1 goal not met   Short Term Goal # 2 Pt will perform STS with min A and LRAD within 6 visits to progress OOB mobility   Goal Outcome # 2 Goal not met   Short Term Goal # 3 Pt will perform functional transfers with LRAD and min A within 6 visits within 6 visits to progress OOB mobility   Goal Outcome # 3 Goal not met   Short Term Goal # 4 Pt will ambulate 25 ft with LRAD and mod A within 6 visits to initiate gait training   Goal Outcome # 4 Goal not met

## 2022-11-10 NOTE — PROGRESS NOTES
Hospital Medicine Daily Progress Note    Date of Service  11/9/2022    Chief Complaint  Jacqueline Webb is a 56 y.o. female admitted 11/2/2022 with altered mental status    Hospital Course  Jacqueline Webb is a 56 y.o. female who presented 11/2/2022 with a history of type 2 diabetes, high cholesterol, vision loss who presented with altered mental status.  ED patient was found to have a left-sided gaze preference, NIH 25.  Subsequent MRI noted with left frontal insular temporal infarct.  Evaluated by neurology recommended continue aspirin, Lipitor and follow-up with [facility].  PT/OT recommending postacute placement.         Interval Problem Update    No overnight events  Worked with PT OT earlier and is currently tired  Discussed with SLP and nursing staff  Brother at bedside updated on plan of care  No accepting facilities discussed with case management      I have discussed this patient's plan of care and discharge plan at IDT rounds today with Case Management, Nursing, Nursing leadership, and other members of the IDT team.    Consultants/Specialty  neurology    Code Status  DNAR/DNI    Disposition  Patient is not medically cleared for discharge.   Anticipate discharge to to skilled nursing facility.  I have placed the appropriate orders for post-discharge needs.    Review of Systems  Review of Systems   Unable to perform ROS: Medical condition      Physical Exam  Temp:  [36.3 °C (97.3 °F)-36.8 °C (98.2 °F)] 36.7 °C (98.1 °F)  Pulse:  [80-91] 86  Resp:  [18] 18  BP: (108-141)/(60-86) 121/69  SpO2:  [91 %-98 %] 91 %    Physical Exam  Vitals and nursing note reviewed.   Constitutional:       General: She is not in acute distress.  HENT:      Head: Normocephalic and atraumatic.      Nose: Nose normal. No rhinorrhea.      Comments: NG in place     Mouth/Throat:      Pharynx: No oropharyngeal exudate or posterior oropharyngeal erythema.   Eyes:      General: No scleral icterus.  Cardiovascular:       Rate and Rhythm: Normal rate and regular rhythm.      Heart sounds: Normal heart sounds. No murmur heard.    No friction rub. No gallop.   Pulmonary:      Effort: Pulmonary effort is normal. No respiratory distress.      Breath sounds: Normal breath sounds. No stridor. No wheezing, rhonchi or rales.   Chest:      Chest wall: No tenderness.   Abdominal:      General: Bowel sounds are normal. There is no distension.      Palpations: Abdomen is soft. There is no mass.      Tenderness: There is no abdominal tenderness. There is no rebound.   Musculoskeletal:         General: No swelling or tenderness.      Cervical back: Neck supple. No rigidity.   Skin:     General: Skin is warm and dry.      Coloration: Skin is not cyanotic or jaundiced.      Nails: There is no clubbing.   Neurological:      Mental Status: She is alert.      Cranial Nerves: Cranial nerve deficit present.      Motor: Weakness present.      Comments: Expressive aphasia  Right hemiplegia right upper extremity 1/5  Right lower extremity weakness 2/5   Psychiatric:         Mood and Affect: Mood normal.         Behavior: Behavior normal.       Fluids    Intake/Output Summary (Last 24 hours) at 11/9/2022 1607  Last data filed at 11/9/2022 0800  Gross per 24 hour   Intake 4530 ml   Output 650 ml   Net 3880 ml         Laboratory  Recent Labs     11/09/22  0046   WBC 11.4*   RBC 4.66   HEMOGLOBIN 13.4   HEMATOCRIT 42.8   MCV 91.8   MCH 28.8   MCHC 31.3*   RDW 40.0   PLATELETCT 340   MPV 9.8       Recent Labs     11/09/22  0046   SODIUM 137   POTASSIUM 4.5   CHLORIDE 97   CO2 31   GLUCOSE 221*   BUN 44*   CREATININE 0.52   CALCIUM 10.1                         Imaging  CT-CHEST (THORAX) WITH   Final Result      1.  No evidence of pulmonary nodule. Questioned pulmonary nodule on abdominal film due to callus formation from subacute/chronic right anterior fourth rib fracture.      2.  Porcelain gallbladder.      Fleischner Society pulmonary nodule  recommendations:   Not Applicable         DX-ABDOMEN FOR TUBE PLACEMENT   Final Result      Enteric tube with catheter tip in the left upper quadrant consistent with intragastric location.      MR-BRAIN-W/O   Final Result         Acute infarct in the left frontal, insular and left medial temporal region. Small acute infarct also noted in the left internal capsule anterior limb and adjacent basal ganglia.      Occlusion of the left distal cervical and petrous ICA.      Remote right parietal infarct.      Age-related volume loss and chronic microvascular ischemic changes.      DX-ABDOMEN FOR TUBE PLACEMENT   Final Result         1.  Nonspecific bowel gas pattern in the upper abdomen.   2.  Nasogastric tube tip terminates overlying the expected location of the pylorus or first duodenal segment.   3.  Atherosclerosis      DX-ABDOMEN FOR TUBE PLACEMENT   Final Result         1.  Nonspecific bowel gas pattern in the upper abdomen.   2.  Nasogastric tube with side-port at the gastroesophageal junction, recommend advancement.   3.  Right midlung nodular density, recommend follow-up CT chest for further characterization.   4.  Atherosclerosis      These findings were discussed with the patient's clinician, Dr. Puente, on 11/4/2022 7:25 AM.      EC-ECHOCARDIOGRAM COMPLETE W/O CONT   Final Result      DX-CHEST-PORTABLE (1 VIEW)   Final Result      No acute cardiac or pulmonary abnormalities are identified.      CT-CTA NECK WITH & W/O-POST PROCESSING   Final Result      1.  Occluded LEFT extracranial internal carotid artery. This could be due to thromboembolic disease or dissection.   2.  Estimated 50-75% stenosis of the proximal RIGHT internal carotid artery   3.  Thyroid nodules      CT-CEREBRAL PERFUSION ANALYSIS   Final Result      1.  Moderate sized completed area of infarction noted in the left frontal parasylvian region.      CT-CTA HEAD WITH & W/O-POST PROCESS   Final Result      1.  Occlusion of the imaged extracranial  LEFT internal carotid artery through the cavernous segment with reconstitution of the supraclinoid segment   2.  LEFT M3 branch occlusion            Findings were communicated with and acknowledged by Dr. Sultana via [employer] on 11/2/2022 2:34 PM.      CT-HEAD W/O   Final Result      1.  Moderate sized acute area of infarction involving the left frontal parasylvian region.      2.  Smaller encephalomalacia thinning to the cortex in the right parietal-occipital region.      3.  Mild age-related cerebral atrophy.                Assessment/Plan  * Acute CVA (cerebrovascular accident) (HCC)- (present on admission)  Assessment & Plan  MRI noted with left frontal insular temporal infarct    Continue aspirin atorvastatin  Blood pressure control  Evaluated by neurology with recommendation for Zio patch on discharge    PT/OT recommending postacute placement  Discussed with case management patient with no medical insurance     Thyroid nodule  Assessment & Plan  Noted on CTA of neck will need outpatient follow-up  TSH normal    Carotid stenosis  Assessment & Plan  Aspirin statin  Outpatient monitoring and follow-up    Lung nodule seen on imaging study  Assessment & Plan  Ruled out no evidence of pulmonary nodule on CT    Vision loss  Assessment & Plan  Will need outpatient ophthalmology evaluation    HLD (hyperlipidemia)  Assessment & Plan  Continue atorvastatin    Type 2 diabetes mellitus (HCC)  Assessment & Plan  With hyperglycemia   Hemoglobin A1c 11.4    Increase Lantus 18 units continue sliding scale insulin       VTE prophylaxis: SCDs/TEDs and enoxaparin ppx    I have performed a physical exam and reviewed and updated ROS and Plan today (11/9/2022). In review of yesterday's note (11/8/2022), there are no changes except as documented above.

## 2022-11-10 NOTE — PROGRESS NOTES
Hospital Medicine Daily Progress Note    Date of Service  11/10/2022    Chief Complaint  Jacqueline Webb is a 56 y.o. female admitted 11/2/2022 with altered mental status    Hospital Course  Jacqueline Webb is a 56 y.o. female who presented 11/2/2022 with a history of type 2 diabetes, high cholesterol, vision loss who presented with altered mental status.  ED patient was found to have a left-sided gaze preference, NIH 25.  Subsequent MRI noted with left frontal insular temporal infarct.  Evaluated by neurology recommended continue aspirin, Lipitor and follow-up with [facility].  PT/OT recommending postacute placement.         Interval Problem Update      Patient is afebrile on 1 L nasal cannula  Tolerating tube feeding  Brother at bedside discussed plan of care with him    I have discussed this patient's plan of care and discharge plan at IDT rounds today with Case Management, Nursing, Nursing leadership, and other members of the IDT team.    Consultants/Specialty  neurology    Code Status  DNAR/DNI    Disposition  Patient is not medically cleared for discharge.   Anticipate discharge to to skilled nursing facility.  I have placed the appropriate orders for post-discharge needs.    Review of Systems  Review of Systems   Unable to perform ROS: Medical condition      Physical Exam  Temp:  [36.5 °C (97.7 °F)-36.7 °C (98.1 °F)] 36.5 °C (97.7 °F)  Pulse:  [83-97] 84  Resp:  [18] 18  BP: (113-125)/(65-68) 121/66  SpO2:  [89 %-97 %] 97 %    Physical Exam  Vitals and nursing note reviewed.   Constitutional:       General: She is not in acute distress.  HENT:      Head: Normocephalic and atraumatic.      Nose: Nose normal. No rhinorrhea.      Comments: NG in place     Mouth/Throat:      Pharynx: No oropharyngeal exudate or posterior oropharyngeal erythema.   Eyes:      General: No scleral icterus.        Right eye: No discharge.         Left eye: No discharge.   Cardiovascular:      Rate and Rhythm:  Normal rate and regular rhythm.      Heart sounds: Normal heart sounds. No murmur heard.    No friction rub. No gallop.   Pulmonary:      Effort: Pulmonary effort is normal. No respiratory distress.      Breath sounds: Normal breath sounds. No stridor. No wheezing, rhonchi or rales.   Chest:      Chest wall: No tenderness.   Abdominal:      General: Bowel sounds are normal. There is no distension.      Palpations: Abdomen is soft. There is no mass.      Tenderness: There is no abdominal tenderness. There is no rebound.   Musculoskeletal:         General: No swelling or tenderness.      Cervical back: Neck supple. No rigidity.   Skin:     General: Skin is warm and dry.      Coloration: Skin is not cyanotic or jaundiced.      Nails: There is no clubbing.   Neurological:      Mental Status: She is alert and oriented to person, place, and time.      Cranial Nerves: Cranial nerve deficit present.      Motor: Weakness present.      Comments: Expressive aphasia  Right upper extremity 1/5 right lower extremity 2/5   Psychiatric:         Mood and Affect: Mood normal.         Behavior: Behavior normal.       Fluids    Intake/Output Summary (Last 24 hours) at 11/10/2022 1503  Last data filed at 11/10/2022 1201  Gross per 24 hour   Intake 1230 ml   Output --   Net 1230 ml         Laboratory  Recent Labs     11/09/22  0046   WBC 11.4*   RBC 4.66   HEMOGLOBIN 13.4   HEMATOCRIT 42.8   MCV 91.8   MCH 28.8   MCHC 31.3*   RDW 40.0   PLATELETCT 340   MPV 9.8       Recent Labs     11/09/22  0046   SODIUM 137   POTASSIUM 4.5   CHLORIDE 97   CO2 31   GLUCOSE 221*   BUN 44*   CREATININE 0.52   CALCIUM 10.1                         Imaging  CT-CHEST (THORAX) WITH   Final Result      1.  No evidence of pulmonary nodule. Questioned pulmonary nodule on abdominal film due to callus formation from subacute/chronic right anterior fourth rib fracture.      2.  Porcelain gallbladder.      Fleischner Society pulmonary nodule recommendations:   Not  Applicable         DX-ABDOMEN FOR TUBE PLACEMENT   Final Result      Enteric tube with catheter tip in the left upper quadrant consistent with intragastric location.      MR-BRAIN-W/O   Final Result         Acute infarct in the left frontal, insular and left medial temporal region. Small acute infarct also noted in the left internal capsule anterior limb and adjacent basal ganglia.      Occlusion of the left distal cervical and petrous ICA.      Remote right parietal infarct.      Age-related volume loss and chronic microvascular ischemic changes.      DX-ABDOMEN FOR TUBE PLACEMENT   Final Result         1.  Nonspecific bowel gas pattern in the upper abdomen.   2.  Nasogastric tube tip terminates overlying the expected location of the pylorus or first duodenal segment.   3.  Atherosclerosis      DX-ABDOMEN FOR TUBE PLACEMENT   Final Result         1.  Nonspecific bowel gas pattern in the upper abdomen.   2.  Nasogastric tube with side-port at the gastroesophageal junction, recommend advancement.   3.  Right midlung nodular density, recommend follow-up CT chest for further characterization.   4.  Atherosclerosis      These findings were discussed with the patient's clinician, Dr. Puente, on 11/4/2022 7:25 AM.      EC-ECHOCARDIOGRAM COMPLETE W/O CONT   Final Result      DX-CHEST-PORTABLE (1 VIEW)   Final Result      No acute cardiac or pulmonary abnormalities are identified.      CT-CTA NECK WITH & W/O-POST PROCESSING   Final Result      1.  Occluded LEFT extracranial internal carotid artery. This could be due to thromboembolic disease or dissection.   2.  Estimated 50-75% stenosis of the proximal RIGHT internal carotid artery   3.  Thyroid nodules      CT-CEREBRAL PERFUSION ANALYSIS   Final Result      1.  Moderate sized completed area of infarction noted in the left frontal parasylvian region.      CT-CTA HEAD WITH & W/O-POST PROCESS   Final Result      1.  Occlusion of the imaged extracranial LEFT internal carotid  artery through the cavernous segment with reconstitution of the supraclinoid segment   2.  LEFT M3 branch occlusion            Findings were communicated with and acknowledged by Dr. Sultana via Vo[employer] on 11/2/2022 2:34 PM.      CT-HEAD W/O   Final Result      1.  Moderate sized acute area of infarction involving the left frontal parasylvian region.      2.  Smaller encephalomalacia thinning to the cortex in the right parietal-occipital region.      3.  Mild age-related cerebral atrophy.                Assessment/Plan  * Acute CVA (cerebrovascular accident) (HCC)- (present on admission)  Assessment & Plan  MRI noted with left frontal insular temporal infarct    Continue aspirin atorvastatin  Blood pressure control  Evaluated by neurology with recommendation for Zio patch on discharge    PT/OT recommending postacute placement  Discussed with case management no accepting SNF    Thyroid nodule  Assessment & Plan  Noted on CTA of neck will need outpatient follow-up  TSH normal    Carotid stenosis  Assessment & Plan  Aspirin statin  Outpatient monitoring and follow-up    Vision loss  Assessment & Plan  Will need outpatient ophthalmology evaluation    HLD (hyperlipidemia)  Assessment & Plan  Continue atorvastatin    Type 2 diabetes mellitus (HCC)  Assessment & Plan  With hyperglycemia   Hemoglobin A1c 11.4    Remains uncontrolled increase Lantus 25 units continue sliding scale insulin monitor CBGs       VTE prophylaxis: SCDs/TEDs and enoxaparin ppx    I have performed a physical exam and reviewed and updated ROS and Plan today (11/10/2022). In review of yesterday's note (11/9/2022), there are no changes except as documented above.

## 2022-11-10 NOTE — PROGRESS NOTES
Monitor Summary: SR 87-96, [place] .14, QRS .08, QT .35 with and rare PVC per strip from monitor room

## 2022-11-10 NOTE — THERAPY
Physical Therapy   Daily Treatment     Patient Name: Jacqueline Webb  Age:  56 y.o., Sex:  female  Medical Record #: [id number]  Today's Date: 11/10/2022     Precautions  Precautions: Fall Risk;Nasogastric Tube  Comments: right rocio, aphasic, legally blind per brother    Assessment    Pt greeted, younger brother present for session. Pt req'd MaxA to come to EOB, pt able to participate more in bringing LE to EOB. In sitting pt demo'd R side lean, using L UE on bedrail to maintain balance. Pt continues to demo difficulty with lifting head. In sitting therapist encouraged UE mvmts, but giving reaching targets, R UE flaccid, L LE w/ very limited participation. Pt able to perform limited ROM LAQ, therapist assist with full ROM and ankle PROM. Pt fatigues quickly and was returned to bed. Pt will continue to benefit from skilled PT to improve functional mobility.     Plan    Continue current treatment plan.    DC Equipment Recommendations: Unable to determine at this time  Discharge Recommendations: Recommend post-acute placement for additional physical therapy services prior to discharge home       11/10/22 8210   Other Treatments   Other Treatments Provided Pt continues to demo difficulty reaching targets with UE, R UE flaccid. Pt w/ minimal participation for LAQ, less so on R LE. Continue to demo difficulty with lifting head, eyes more open and responsive today.   Balance   Sitting Balance (Static) Poor -   Sitting Balance (Dynamic) Poor -   Standing Balance (Static) Trace +   Standing Balance (Dynamic) Dependent   Weight Shift Sitting Poor   Weight Shift Standing Absent   Skilled Intervention Verbal Cuing;Postural Facilitation;Tactile Cuing   Comments R side lean in sitting. Pt uses L UE to hold on to bed rail to maintain sitting balance.   Gait Analysis   Gait Level Of Assist Unable to Participate   Bed Mobility    Supine to Sit Maximal Assist   Sit to Supine Maximal Assist   Scooting Maximal Assist   Rolling Maximal  Assist to Rt.;Maximum Assist to Lt.   Skilled Intervention Sequencing;Tactile Cuing;Verbal Cuing   Comments Pt participating more with bring LE over to EOB in prep for sitting.   Functional Mobility   Comments focused on EOB activity   Activity Tolerance   Sitting Edge of Bed 15 min   Comments pt fatigues   Short Term Goals    Short Term Goal # 1 Pt will perform supine <> sit with min A within 6 visits to progress bed mobility   Goal Outcome # 1 goal not met   Short Term Goal # 2 Pt will perform STS with min A and LRAD within 6 visits to progress OOB mobility   Goal Outcome # 2 Goal not met   Short Term Goal # 3 Pt will perform functional transfers with LRAD and min A within 6 visits within 6 visits to progress OOB mobility   Goal Outcome # 3 Goal not met   Short Term Goal # 4 Pt will ambulate 25 ft with LRAD and mod A within 6 visits to initiate gait training   Goal Outcome # 4 Goal not met   Supervising Physical Therapist (PTA Treatments Only)   Supervising Physical Therapist Sowmya Anthony

## 2022-11-10 NOTE — CARE PLAN
The patient is Stable - Low risk of patient condition declining or worsening    Shift Goals  Clinical Goals: Monitor neuro status, maintain skin integrity  Patient Goals: DALE  Family Goals: DALE    Progress made toward(s) clinical / shift goals:  Patient is hard to assess as she is non-verbal. Patient will nod appropriately to yes or no question, patient is AAOx2. She has no complaints of pain. Q2 hour turns in place to maintain skin integrity and to help relieve pain.Q4 hour neuro checks in place to monitor neuro status. Will continue to monitor patient. Bed low, locked and call light in place.    Problem: Optimal Care of the Stroke Patient  Goal: Optimal acute care for the stroke patient  Outcome: Progressing  Stroke core measures in place   Problem: Skin Integrity  Goal: Skin integrity is maintained or improved  Outcome: Progressing  Q2 hour turns in place, waffle mattress and protective dressings in place to maintain skin integrity   Problem: Pain - Standard  Goal: Alleviation of pain or a reduction in pain to the patient’s comfort goal  Outcome: Progressing   No complaints of pain, will continue to monitor    Patient is not progressing towards the following goals: N/A      Problem: Knowledge Deficit - Stroke Education  Goal: Patient's knowledge of stroke and risk factors will improve  Outcome: Not Progressing     Problem: Psychosocial - Patient Condition  Goal: Patient's ability to verbalize feelings about condition will improve  Outcome: Not Progressing

## 2022-11-11 NOTE — DIETARY
"Nutrition support weekly update:  Day 9 of admit.  Jacqueline Webb is a 56 y.o. female with admitting DX of acute CVA.    Tube feeding initiated on . Current TF via NGT is Diabetisource AC @ 50 mL/hr providing 1440 kcals, 72 grams protein and 984 mL free water.     Assessment:  Weight 59.1 kg-unsure of wt source. Previous needs based on weight from other healthcare provider.   Re-estimate of nutritional needs is indicated with updated wt.      Assessment:  Estimated Nutritional Needs: based on:   Height: 147.3 cm (4' 10\")  Weight: 59.1 kg (130 lb 4.7 oz)  Weight to Use in Calculations: 59.1 kg (130 lb 4.7 oz)  Body mass index is 27.23 kg/m²., BMI classification: overweight     Calculation/Equation:   REE per AWD=3701 kcals/day (x1.8=8176 kcals/day)  Total Calories / day: 1180 - 1475 (Calories / k - 25)  Total Grams Protein / day: 59 - 71 (Grams Protein / k.0 - 1.2)       Evaluation:   Pt currently receiving and tolerating Diabetisource AC @ goal rate of 50 mL/hr.   Current clinical picture and MD progress notes reviewed   Labs () Glu 221 (H), A1C (11/3) 11.4 (H)  Meds: Lantus, SSI  Skin: no noted staged wounds or pressure injuries   +BM 11/10  Current feeding remains appropriate even with re estimation of needs. Diabetisource remains an appropriate formula to aid in blood sugar control.     Malnutrition risk: no new criteria identified    Recommendations/Plan:  Continue with Diabetisource AC @ goal of 50 mL/hr providing 1440 kcals, 72 grams protein and 984 mL free water.  Fluids per MD  Diet advancement as medically feasible per MD and SLP    RD following                                  "

## 2022-11-11 NOTE — CARE PLAN
The patient is Stable - Low risk of patient condition declining or worsening    Shift Goals  Clinical Goals: Monitor neuro status, maintain skin integrity  Patient Goals: DALE  Family Goals: DALE    Progress made toward(s) clinical / shift goals:      Problem: Optimal Care of the Stroke Patient  Goal: Optimal emergency care for the stroke patient  Outcome: Progressing     Problem: Knowledge Deficit - Stroke Education  Goal: Patient's knowledge of stroke and risk factors will improve  Outcome: Progressing     Problem: Psychosocial - Patient Condition  Goal: Patient's ability to verbalize feelings about condition will improve  Outcome: Progressing     Problem: Psychosocial - Patient Condition  Goal: Patient's ability to re-evaluate and adapt role responsibilities will improve  Outcome: Progressing     Problem: Discharge Planning - Stroke  Goal: Ensure Stroke Core Measures are met prior to discharge  Outcome: Progressing     Problem: Neuro Status  Goal: Neuro status will remain stable or improve  Outcome: Progressing     Problem: Neuro Status  Goal: Neuro status will remain stable or improve  Outcome: Progressing     Problem: Hemodynamic Monitoring  Goal: Patient's hemodynamics, fluid balance and neurologic status will be stable or improve  Outcome: Progressing     Problem: Respiratory - Stroke Patient  Goal: Patient will achieve/maintain optimum respiratory rate/effort  Outcome: Progressing     Problem: Dysphagia  Goal: Dysphagia will improve  Outcome: Progressing     Problem: Risk for Aspiration  Goal: Patient's risk for aspiration will be absent or decrease  Outcome: Progressing     Problem: Mobility - Stroke  Goal: Patient's capacity to carry out activities will improve  Outcome: Progressing     Problem: Mobility - Stroke  Goal: Spasticity will be prevented or improved  Outcome: Progressing     Problem: Mobility - Stroke  Goal: Subluxation will be prevented or improved  Outcome: Progressing     Problem: Knowledge  Deficit - Standard  Goal: Patient and family/care givers will demonstrate understanding of plan of care, disease process/condition, diagnostic tests and medications  Outcome: Progressing     Problem: Skin Integrity  Goal: Skin integrity is maintained or improved  Outcome: Progressing     Problem: Fall Risk  Goal: Patient will remain free from falls  Outcome: Progressing     Problem: Pain - Standard  Goal: Alleviation of pain or a reduction in pain to the patient’s comfort goal  Outcome: Progressing

## 2022-11-11 NOTE — CARE PLAN
The patient is Stable - Low risk of patient condition declining or worsening    Shift Goals  Clinical Goals: stable neuro status  Patient Goals: khadra  Family Goals: khadra    Progress made toward(s) clinical / shift goals:    Problem: Neuro Status  Goal: Neuro status will remain stable or improve  Outcome: Progressing     Stable status  Problem: Risk for Aspiration  Goal: Patient's risk for aspiration will be absent or decrease  Outcome: Progressing   HOB>30, oral care and suction provided  Patient is not progressing towards the following goals:

## 2022-11-11 NOTE — CARE PLAN
The patient is Stable - Low risk of patient condition declining or worsening    Shift Goals  Clinical Goals: Monitor neuro status, maintain skin integrity  Patient Goals: DALE  Family Goals: DALE    Progress made toward(s) clinical / shift goals:  Patient is hard to assess, she is non-verbal but is able to nod appropriately to yes or no questions. Patient is AAOx2. Stroke measures are being met, Q4 hour neuro checks in place. Q2 turns in place to maintain skin integrity. Bed low, locked and call light in place.    Problem: Optimal Care of the Stroke Patient  Goal: Optimal acute care for the stroke patient  Outcome: Progressing   Core measures in place  Problem: Discharge Planning - Stroke  Goal: Ensure Stroke Core Measures are met prior to discharge  Outcome: Progressing  Goal: Patient’s continuum of care needs will be met  Outcome: Progressing   Q4 hour neuro checks in place    Patient is not progressing towards the following goals:    Problem: Neuro Status  Goal: Neuro status will remain stable or improve  Outcome: Not Progressing   Patient has been AAOx2 the past two days but is still non-verbal, Q4 hour neuro checks in place

## 2022-11-11 NOTE — THERAPY
Speech Language Pathology  Daily Treatment     Patient Name: Jacqueline Webb  Age:  56 y.o., Sex:  female  Medical Record #: [id number]  Today's Date: 11/11/2022     Precautions  Precautions: Fall Risk, Nasogastric Tube, Swallow Precautions ( See Comments)  Comments: R rocio, legally blind    Assessment  Attempted to see pt earlier in AM with sleepiness noted. Pt alternating between sleeping and brief wakefulness, and at times with movement of her toes and left hand with eyes closed but possibly awake. Two single ice chips with no swallow triggered and oral suction used. Max stimulation with brief eye opening. Pt with minimal oral cavity opening and labial movement when SLP attempting to target simple oral motor directives w/model and verbal cues. No vocalizing with model and cues. SLP provided educ to the pt and her brother regarding possible longer term dysphagia, at 30 days or greater, before pt is able to meet her nutritional needs orally. The possibility of a PEG tube was discussed and educ regarding pt's current swallow status and functional level related to dysphagia and aphasia was provided. Pt's brother stating he will talk to pt's family regarding possible PEG. SLP collaborated with charge RN and MD regarding this session and PEG. Per MD, pt more awake this am and following directives r/t limb movement.       Plan  Target sustained wakefulness, simple oral motor movements/directives, prefeeding with ice and MT2 during prefeeding.   Continue current treatment plan.    Discharge Recommendations: Recommend post-acute placement for additional speech therapy services prior to discharge home       11/11/22 1325   Verbal Expression   Comments one yes had nod   Auditory Comprehension   Comments pt following directive to squeeze SLP's hand, using her left hand, if she was listening when SLP providing educ to pt and her brother regarding dysphagia and possible PEG   Voice   Comments no vocalizing during dysphagia session.    Dysphagia    Dysphagia X   Positioning / Behavior Modification Modulate Rate or Bite Size;Other (see Comments)  (sitting at 90 degrees, single ice chips when pt awake and responding.)   Diet / Liquid Recommendation NPO;Pre-Feeding Trials with SLP Only   Nutritional Liquid Intake Rating Scale Nothing by mouth   Nutritional Food Intake Rating Scale Tube dependent with minimal attempts of oral intake  (SLP prefeeding only)   Nursing Communication Swallow Precaution Sign Posted at Head of Bed   Skilled Intervention Compensatory Strategies;Verbal Cueing   Comments educ to pt and her brother regarding current dysphagia status and general educ regarding possible PEG.   Recommended Route of Medication Administration   Medication Administration  Via Gastric Tube   Patient / Family Goals   Patient / Family Goal #1 Shook head Y for more ice chips   Goal #1 Outcome Goal not met   Short Term Goals   Short Term Goal # 1 modified 11/7:  Patient will consume PO trials of single ice chips and 1/2 tsp boluses of MTL with SLP only with no overt S/Sx of aspiration noted   Goal Outcome # 1 Progressing slower than expected   Short Term Goal # 2 Pt will demonstrate a reliable Y and N to express wants and needs x10 in a session.   Goal Outcome # 2  Goal not met   Short Term Goal # 3 11/11 Pt will maintain wakefulness for greater than 15 min with mod max stimulation.   Goal Outcome  # 3 Progressing slower than expected   Short Term Goal # 5 Pt will attempt vocalization x3 in a session given mod cues from SLP.   Goal Outcome  # 5 Progressing slower than expected

## 2022-11-11 NOTE — DISCHARGE PLANNING
1545  Have spoken w/brother,Gilberto, in regard to asking for PFA for assessment of medicaid for pt. Also,  they are trying to get her on disability.  Sister, Karuna, is working on getting her help.  When she got laid off at the [employer] in 2019 she had no insurance.      Then she got on Access to Health Care and no longer has that.    They are trying hard to get her some help.  Karuna is the one to speak with.      Told him that the PFA will proberly be calling to see if she qualified.

## 2022-11-11 NOTE — PROGRESS NOTES
Hospital Medicine Daily Progress Note    Date of Service  11/11/2022    Chief Complaint  Jacqueline Webb is a 56 y.o. female admitted 11/2/2022 with altered mental status    Hospital Course  Jacqueline Webb is a 56 y.o. female who presented 11/2/2022 with a history of type 2 diabetes, high cholesterol, vision loss who presented with altered mental status.  ED patient was found to have a left-sided gaze preference, NIH 25.  Subsequent MRI noted with left frontal insular temporal infarct.  Evaluated by neurology recommended continue aspirin, Lipitor and follow-up with outpatient [facility].  PT/OT recommending postacute placement.         Interval Problem Update      Patient is afebrile on room air  CBGs elevated insulin titrated  Discussed with SLP patient will likely need long-term tube feeding  Brother at bedside discussed with him treatment options he is in agreement with proceeding with PEG  Discussed with case management no accepting facility    I have discussed this patient's plan of care and discharge plan at IDT rounds today with Case Management, Nursing, Nursing leadership, and other members of the IDT team.    Consultants/Specialty  neurology    Code Status  DNAR/DNI    Disposition  Patient is not medically cleared for discharge.   Anticipate discharge to to skilled nursing facility.  I have placed the appropriate orders for post-discharge needs.    Review of Systems  Review of Systems   Unable to perform ROS: Medical condition      Physical Exam  Temp:  [36.5 °C (97.7 °F)-36.8 °C (98.2 °F)] 36.8 °C (98.2 °F)  Pulse:  [88-98] 98  Resp:  [18] 18  BP: (112-123)/(64-73) 123/73  SpO2:  [93 %-95 %] 93 %    Physical Exam  Vitals and nursing note reviewed.   Constitutional:       General: She is not in acute distress.  HENT:      Head: Normocephalic and atraumatic.      Nose: Nose normal. No rhinorrhea.      Comments: NG in place     Mouth/Throat:      Pharynx: No oropharyngeal exudate or posterior  oropharyngeal erythema.   Eyes:      General: No scleral icterus.        Right eye: No discharge.         Left eye: No discharge.   Cardiovascular:      Rate and Rhythm: Normal rate and regular rhythm.      Heart sounds: Normal heart sounds. No murmur heard.    No friction rub. No gallop.   Pulmonary:      Effort: Pulmonary effort is normal. No respiratory distress.      Breath sounds: Normal breath sounds. No stridor. No wheezing, rhonchi or rales.   Chest:      Chest wall: No tenderness.   Abdominal:      General: Bowel sounds are normal. There is no distension.      Palpations: Abdomen is soft. There is no mass.      Tenderness: There is no abdominal tenderness. There is no rebound.   Musculoskeletal:         General: No swelling or tenderness.      Cervical back: Neck supple. No rigidity.   Skin:     General: Skin is warm and dry.      Coloration: Skin is not cyanotic or jaundiced.      Nails: There is no clubbing.   Neurological:      Mental Status: She is alert and oriented to person, place, and time.      Cranial Nerves: Cranial nerve deficit present.      Motor: Weakness present.      Comments: Patient with expressive aphasia  Follows commands  Right hemiplegia 1/5 right upper extremity 2/5 right lower extremity   Psychiatric:         Mood and Affect: Mood normal.         Behavior: Behavior normal.       Fluids    Intake/Output Summary (Last 24 hours) at 11/11/2022 1433  Last data filed at 11/11/2022 0800  Gross per 24 hour   Intake 1320 ml   Output 700 ml   Net 620 ml         Laboratory  Recent Labs     11/09/22  0046   WBC 11.4*   RBC 4.66   HEMOGLOBIN 13.4   HEMATOCRIT 42.8   MCV 91.8   MCH 28.8   MCHC 31.3*   RDW 40.0   PLATELETCT 340   MPV 9.8       Recent Labs     11/09/22  0046   SODIUM 137   POTASSIUM 4.5   CHLORIDE 97   CO2 31   GLUCOSE 221*   BUN 44*   CREATININE 0.52   CALCIUM 10.1                         Imaging  CT-CHEST (THORAX) WITH   Final Result      1.  No evidence of pulmonary nodule.  Questioned pulmonary nodule on abdominal film due to callus formation from subacute/chronic right anterior fourth rib fracture.      2.  Porcelain gallbladder.      Fleischner Society pulmonary nodule recommendations:   Not Applicable         DX-ABDOMEN FOR TUBE PLACEMENT   Final Result      Enteric tube with catheter tip in the left upper quadrant consistent with intragastric location.      MR-BRAIN-W/O   Final Result         Acute infarct in the left frontal, insular and left medial temporal region. Small acute infarct also noted in the left internal capsule anterior limb and adjacent basal ganglia.      Occlusion of the left distal cervical and petrous ICA.      Remote right parietal infarct.      Age-related volume loss and chronic microvascular ischemic changes.      DX-ABDOMEN FOR TUBE PLACEMENT   Final Result         1.  Nonspecific bowel gas pattern in the upper abdomen.   2.  Nasogastric tube tip terminates overlying the expected location of the pylorus or first duodenal segment.   3.  Atherosclerosis      DX-ABDOMEN FOR TUBE PLACEMENT   Final Result         1.  Nonspecific bowel gas pattern in the upper abdomen.   2.  Nasogastric tube with side-port at the gastroesophageal junction, recommend advancement.   3.  Right midlung nodular density, recommend follow-up CT chest for further characterization.   4.  Atherosclerosis      These findings were discussed with the patient's clinician, Dr. Puente, on 11/4/2022 7:25 AM.      EC-ECHOCARDIOGRAM COMPLETE W/O CONT   Final Result      DX-CHEST-PORTABLE (1 VIEW)   Final Result      No acute cardiac or pulmonary abnormalities are identified.      CT-CTA NECK WITH & W/O-POST PROCESSING   Final Result      1.  Occluded LEFT extracranial internal carotid artery. This could be due to thromboembolic disease or dissection.   2.  Estimated 50-75% stenosis of the proximal RIGHT internal carotid artery   3.  Thyroid nodules      CT-CEREBRAL PERFUSION ANALYSIS   Final Result       1.  Moderate sized completed area of infarction noted in the left frontal parasylvian region.      CT-CTA HEAD WITH & W/O-POST PROCESS   Final Result      1.  Occlusion of the imaged extracranial LEFT internal carotid artery through the cavernous segment with reconstitution of the supraclinoid segment   2.  LEFT M3 branch occlusion            Findings were communicated with and acknowledged by Dr. Sultana via [employer] on 11/2/2022 2:34 PM.      CT-HEAD W/O   Final Result      1.  Moderate sized acute area of infarction involving the left frontal parasylvian region.      2.  Smaller encephalomalacia thinning to the cortex in the right parietal-occipital region.      3.  Mild age-related cerebral atrophy.                Assessment/Plan  * Acute CVA (cerebrovascular accident) (HCC)- (present on admission)  Assessment & Plan  MRI noted with left frontal insular temporal infarct    Continue aspirin atorvastatin  Blood pressure control  Evaluated by neurology with recommendation for Zio patch on discharge    PT/OT recommending postacute placement  Discussed with case management no accepting SNF  Discussed with SLP and with patient's brother treatment options family is in agreement with PEG    Thyroid nodule  Assessment & Plan  Noted on CTA of neck will need outpatient follow-up  TSH normal    Carotid stenosis  Assessment & Plan  Aspirin statin  Outpatient monitoring and follow-up    Vision loss  Assessment & Plan  Will need outpatient ophthalmology evaluation    HLD (hyperlipidemia)  Assessment & Plan  Continue atorvastatin    Type 2 diabetes mellitus (HCC)  Assessment & Plan  With hyperglycemia   Hemoglobin A1c 11.4    Remains uncontrolled   Increase Lantus to 30 units increase to high sliding scale insulin monitor CBGs       VTE prophylaxis: SCDs/TEDs and enoxaparin ppx    I have performed a physical exam and reviewed and updated ROS and Plan today (11/11/2022). In review of yesterday's note (11/10/2022), there  are no changes except as documented above.

## 2022-11-11 NOTE — DISCHARGE PLANNING
Care Transition Team Discharge Planning    Anticipated Discharge Information  Discharge Disposition: D/T to SNF with Medicare cert in anticipation of skilled care (03)              Discharge Plan: JARET Joiner spoke with PFA regarding institutional medicaid for long term care, and asked PFA to reach out to Karuna, Ms. Webb's sister to assist with Karuna filling out the paper work regarding medicaid.  Ms. Webb will likely qualify for disability as well, [place] will reach out to Karuna and inform her that she can go to the social security web site to fill out disability papers as well.

## 2022-11-12 NOTE — CARE PLAN
Problem: Optimal Care of the Stroke Patient  Goal: Optimal emergency care for the stroke patient  Outcome: Progressing  Goal: Optimal acute care for the stroke patient  Outcome: Progressing     Problem: Knowledge Deficit - Stroke Education  Goal: Patient's knowledge of stroke and risk factors will improve  Outcome: Progressing     Problem: Psychosocial - Patient Condition  Goal: Patient's ability to verbalize feelings about condition will improve  Outcome: Progressing  Goal: Patient's ability to re-evaluate and adapt role responsibilities will improve  Outcome: Progressing     Problem: Discharge Planning - Stroke  Goal: Ensure Stroke Core Measures are met prior to discharge  Outcome: Progressing  Goal: Patient’s continuum of care needs will be met  Outcome: Progressing     Problem: Dysphagia  Goal: Dysphagia will improve  Outcome: Progressing     Problem: Risk for Aspiration  Goal: Patient's risk for aspiration will be absent or decrease  Outcome: Progressing     Problem: Mobility - Stroke  Goal: Patient's capacity to carry out activities will improve  Outcome: Progressing  Goal: Spasticity will be prevented or improved  Outcome: Progressing  Goal: Subluxation will be prevented or improved  Outcome: Progressing     Problem: Self Care  Goal: Patient will have the ability to perform ADLs independently or with assistance (bathe, groom, dress, toilet and feed)  Outcome: Progressing     Problem: Skin Integrity  Goal: Skin integrity is maintained or improved  Outcome: Progressing     Problem: Fall Risk  Goal: Patient will remain free from falls  Outcome: Progressing   The patient is Stable - Low risk of patient condition declining or worsening    Shift Goals  Clinical Goals: Maintain skin integrity  Patient Goals: DALE  Family Goals: No family at bedside    Progress made toward(s) clinical / shift goals:  Pt has all skin breakdown prevention interventions in place.    Patient is not progressing towards the following  goals:

## 2022-11-12 NOTE — PROGRESS NOTES
Hospital Medicine Daily Progress Note    Date of Service  11/12/2022    Chief Complaint  Jacqueline Webb is a 56 y.o. female admitted 11/2/2022 with altered mental status    Hospital Course  Jacqueline Webb is a 56 y.o. female who presented 11/2/2022 with a history of type 2 diabetes, high cholesterol, vision loss who presented with altered mental status.  ED patient was found to have a left-sided gaze preference, NIH 25.  Subsequent MRI noted with left frontal insular temporal infarct.  Evaluated by neurology recommended continue aspirin, Lipitor and follow-up with [facility].  PT/OT recommending postacute placement.         Interval Problem Update    Patient is afebrile  Tolerating tube feeding  Sister at bedside with questions that were answered  CBGs 175-226    I have discussed this patient's plan of care and discharge plan at IDT rounds today with Case Management, Nursing, Nursing leadership, and other members of the IDT team.    Consultants/Specialty  neurology    Code Status  DNAR/DNI    Disposition  Patient is not medically cleared for discharge.   Anticipate discharge to to skilled nursing facility.  I have placed the appropriate orders for post-discharge needs.    Review of Systems  Review of Systems   Unable to perform ROS: Medical condition      Physical Exam  Temp:  [36.5 °C (97.7 °F)-36.7 °C (98.1 °F)] 36.5 °C (97.7 °F)  Pulse:  [85-95] 86  Resp:  [18] 18  BP: (108-148)/(70-82) 137/82  SpO2:  [94 %-98 %] 98 %    Physical Exam  Vitals and nursing note reviewed.   Constitutional:       General: She is not in acute distress.  HENT:      Head: Normocephalic and atraumatic.      Nose: Nose normal. No rhinorrhea.      Comments: NG in place     Mouth/Throat:      Pharynx: No oropharyngeal exudate or posterior oropharyngeal erythema.   Eyes:      General: No scleral icterus.        Right eye: No discharge.         Left eye: No discharge.   Cardiovascular:      Rate and Rhythm: Normal rate  and regular rhythm.      Heart sounds: Normal heart sounds. No murmur heard.    No friction rub. No gallop.   Pulmonary:      Effort: Pulmonary effort is normal. No respiratory distress.      Breath sounds: Normal breath sounds. No stridor. No wheezing, rhonchi or rales.   Chest:      Chest wall: No tenderness.   Abdominal:      General: Bowel sounds are normal. There is no distension.      Palpations: Abdomen is soft. There is no mass.      Tenderness: There is no abdominal tenderness. There is no rebound.   Musculoskeletal:         General: No swelling or tenderness.      Cervical back: Neck supple. No rigidity.   Skin:     General: Skin is warm and dry.      Coloration: Skin is not cyanotic or jaundiced.      Nails: There is no clubbing.   Neurological:      Mental Status: She is alert.      Cranial Nerves: Cranial nerve deficit present.      Motor: Weakness present.      Comments: Patient with expressive aphasia  Right upper extremity weakness 1/5 right lower extremity weakness 2/5   Psychiatric:         Mood and Affect: Affect is flat.       Fluids    Intake/Output Summary (Last 24 hours) at 11/12/2022 1432  Last data filed at 11/12/2022 0801  Gross per 24 hour   Intake 610 ml   Output 600 ml   Net 10 ml         Laboratory                                Imaging  CT-CHEST (THORAX) WITH   Final Result      1.  No evidence of pulmonary nodule. Questioned pulmonary nodule on abdominal film due to callus formation from subacute/chronic right anterior fourth rib fracture.      2.  Porcelain gallbladder.      Fleischner Society pulmonary nodule recommendations:   Not Applicable         DX-ABDOMEN FOR TUBE PLACEMENT   Final Result      Enteric tube with catheter tip in the left upper quadrant consistent with intragastric location.      MR-BRAIN-W/O   Final Result         Acute infarct in the left frontal, insular and left medial temporal region. Small acute infarct also noted in the left internal capsule anterior limb  and adjacent basal ganglia.      Occlusion of the left distal cervical and petrous ICA.      Remote right parietal infarct.      Age-related volume loss and chronic microvascular ischemic changes.      DX-ABDOMEN FOR TUBE PLACEMENT   Final Result         1.  Nonspecific bowel gas pattern in the upper abdomen.   2.  Nasogastric tube tip terminates overlying the expected location of the pylorus or first duodenal segment.   3.  Atherosclerosis      DX-ABDOMEN FOR TUBE PLACEMENT   Final Result         1.  Nonspecific bowel gas pattern in the upper abdomen.   2.  Nasogastric tube with side-port at the gastroesophageal junction, recommend advancement.   3.  Right midlung nodular density, recommend follow-up CT chest for further characterization.   4.  Atherosclerosis      These findings were discussed with the patient's clinician, Dr. Puente, on 11/4/2022 7:25 AM.      EC-ECHOCARDIOGRAM COMPLETE W/O CONT   Final Result      DX-CHEST-PORTABLE (1 VIEW)   Final Result      No acute cardiac or pulmonary abnormalities are identified.      CT-CTA NECK WITH & W/O-POST PROCESSING   Final Result      1.  Occluded LEFT extracranial internal carotid artery. This could be due to thromboembolic disease or dissection.   2.  Estimated 50-75% stenosis of the proximal RIGHT internal carotid artery   3.  Thyroid nodules      CT-CEREBRAL PERFUSION ANALYSIS   Final Result      1.  Moderate sized completed area of infarction noted in the left frontal parasylvian region.      CT-CTA HEAD WITH & W/O-POST PROCESS   Final Result      1.  Occlusion of the imaged extracranial LEFT internal carotid artery through the cavernous segment with reconstitution of the supraclinoid segment   2.  LEFT M3 branch occlusion            Findings were communicated with and acknowledged by Dr. Sultana via [employer] on 11/2/2022 2:34 PM.      CT-HEAD W/O   Final Result      1.  Moderate sized acute area of infarction involving the left frontal parasylvian region.       2.  Smaller encephalomalacia thinning to the cortex in the right parietal-occipital region.      3.  Mild age-related cerebral atrophy.         IR-GASTROSTOMY PLACEMENT    (Results Pending)          Assessment/Plan  * Acute CVA (cerebrovascular accident) (HCC)- (present on admission)  Assessment & Plan  MRI noted with left frontal insular temporal infarct    Continue aspirin atorvastatin    Evaluated by neurology with recommendation for Zio patch on discharge    PT/OT recommending postacute placement  Discussed with case management no accepting SNF  Scheduled for PEG placement reviewed with sister who is also in agreement    Thyroid nodule  Assessment & Plan  Noted on CTA of neck will need outpatient follow-up  TSH normal    Carotid stenosis  Assessment & Plan  Aspirin statin  Outpatient monitoring and follow-up    Vision loss  Assessment & Plan  Will need outpatient ophthalmology evaluation    HLD (hyperlipidemia)  Assessment & Plan  Continue atorvastatin    Type 2 diabetes mellitus (HCC)  Assessment & Plan  With hyperglycemia   Hemoglobin A1c 11.4    Improved control but still not at goal  Increase Lantus and continue sliding scale insulin       VTE prophylaxis: SCDs/TEDs and enoxaparin ppx    I have performed a physical exam and reviewed and updated ROS and Plan today (11/12/2022). In review of yesterday's note (11/11/2022), there are no changes except as documented above.

## 2022-11-12 NOTE — CARE PLAN
The patient is Stable - Low risk of patient condition declining or worsening    Shift Goals  Clinical Goals: Stable neuro exam, communication  Patient Goals: DALE  Family Goals: DALE    Progress made toward(s) clinical / shift goals:  Patient remains non-verbal. She does follow commands. Educated patient on nasal cannula and her need for oxygen to maintain O2 saturation above 90%, patient demonstrates understanding of education, she stopped taking her nasal cannula off. Q2 hour turns in place to maintain skin integrity, she is able to help with mobilizing. Patient had a bed bath. Purwick in place. Will continue to monitor. Bed low, locked and call light in reach.    Problem: Optimal Care of the Stroke Patient  Goal: Optimal acute care for the stroke patient  Outcome: Progressing     Problem: Respiratory - Stroke Patient  Goal: Patient will achieve/maintain optimum respiratory rate/effort  Outcome: Progressing     Problem: Urinary Elimination  Goal: Establish and maintain regular urinary output  Outcome: Progressing     Problem: Mobility - Stroke  Goal: Patient's capacity to carry out activities will improve  Outcome: Progressing       Patient is not progressing towards the following goals: N/A

## 2022-11-13 NOTE — CARE PLAN
Problem: Optimal Care of the Stroke Patient  Goal: Optimal emergency care for the stroke patient  Outcome: Progressing  Goal: Optimal acute care for the stroke patient  Outcome: Progressing     Problem: Knowledge Deficit - Stroke Education  Goal: Patient's knowledge of stroke and risk factors will improve  Outcome: Progressing     Problem: Psychosocial - Patient Condition  Goal: Patient's ability to verbalize feelings about condition will improve  Outcome: Progressing  Goal: Patient's ability to re-evaluate and adapt role responsibilities will improve  Outcome: Progressing     Problem: Discharge Planning - Stroke  Goal: Ensure Stroke Core Measures are met prior to discharge  Outcome: Progressing  Goal: Patient’s continuum of care needs will be met  Outcome: Progressing     Problem: Respiratory - Stroke Patient  Goal: Patient will achieve/maintain optimum respiratory rate/effort  Outcome: Progressing     Problem: Hemodynamic Monitoring  Goal: Patient's hemodynamics, fluid balance and neurologic status will be stable or improve  Outcome: Progressing     Problem: Dysphagia  Goal: Dysphagia will improve  Outcome: Progressing     Problem: Risk for Aspiration  Goal: Patient's risk for aspiration will be absent or decrease  Outcome: Progressing     Problem: Mobility - Stroke  Goal: Patient's capacity to carry out activities will improve  Outcome: Progressing  Goal: Spasticity will be prevented or improved  Outcome: Progressing  Goal: Subluxation will be prevented or improved  Outcome: Progressing     Problem: Knowledge Deficit - Standard  Goal: Patient and family/care givers will demonstrate understanding of plan of care, disease process/condition, diagnostic tests and medications  Outcome: Progressing     Problem: Skin Integrity  Goal: Skin integrity is maintained or improved  Outcome: Progressing     Problem: Fall Risk  Goal: Patient will remain free from falls  Outcome: Progressing   The patient is Stable - Low risk  of patient condition declining or worsening    Shift Goals  Clinical Goals: Maintain skin integrity  Patient Goals: Unable to assess  Family Goals: No family at bedside    Progress made toward(s) clinical / shift goals:  Pt has all skin breakdown prevention interventions all in place.    Patient is not progressing towards the following goals:

## 2022-11-13 NOTE — PROGRESS NOTES
Hospital Medicine Daily Progress Note    Date of Service  11/13/2022    Chief Complaint  Jacqueline Webb is a 56 y.o. female admitted 11/2/2022 with altered mental status    Hospital Course  Jacqueline Webb is a 56 y.o. female who presented 11/2/2022 with a history of type 2 diabetes, high cholesterol, vision loss who presented with altered mental status.  ED patient was found to have a left-sided gaze preference, NIH 25.  Subsequent MRI noted with left frontal insular temporal infarct.  Evaluated by neurology recommended continue aspirin, Lipitor and follow-up with [facility].  PT/OT recommending postacute placement.         Interval Problem Update      Patient is afebrile on 2l nc  Denies pain   Tolerating tube feeds  CBGs 158-199  Review of systems limited by expressive aphasia    I have discussed this patient's plan of care and discharge plan at IDT rounds today with Case Management, Nursing, Nursing leadership, and other members of the IDT team.    Consultants/Specialty  neurology    Code Status  DNAR/DNI    Disposition  Patient is medically cleared for discharge.   Anticipate discharge to to skilled nursing facility.  I have placed the appropriate orders for post-discharge needs.    Review of Systems  Review of Systems   Unable to perform ROS: Medical condition      Physical Exam  Temp:  [36.2 °C (97.2 °F)-36.8 °C (98.2 °F)] 36.2 °C (97.2 °F)  Pulse:  [84-91] 88  Resp:  [17-18] 17  BP: (116-135)/(65-80) 133/75  SpO2:  [95 %-99 %] 99 %    Physical Exam  Vitals and nursing note reviewed.   Constitutional:       General: She is not in acute distress.  HENT:      Head: Normocephalic and atraumatic.      Nose: Nose normal. No rhinorrhea.      Comments: NG in place     Mouth/Throat:      Pharynx: No oropharyngeal exudate or posterior oropharyngeal erythema.   Eyes:      General: No scleral icterus.        Right eye: No discharge.         Left eye: No discharge.   Cardiovascular:      Rate and  Rhythm: Normal rate and regular rhythm.      Heart sounds: Normal heart sounds. No murmur heard.    No friction rub. No gallop.   Pulmonary:      Effort: Pulmonary effort is normal. No respiratory distress.      Breath sounds: Normal breath sounds. No stridor. No wheezing, rhonchi or rales.   Chest:      Chest wall: No tenderness.   Abdominal:      General: Bowel sounds are normal. There is no distension.      Palpations: Abdomen is soft. There is no mass.      Tenderness: There is no abdominal tenderness. There is no rebound.   Musculoskeletal:         General: Swelling present. No tenderness.      Cervical back: Neck supple. No rigidity.   Skin:     General: Skin is warm and dry.      Coloration: Skin is not cyanotic or jaundiced.      Nails: There is no clubbing.   Neurological:      Mental Status: She is alert and oriented to person, place, and time.      Cranial Nerves: Cranial nerve deficit present.      Motor: Weakness present.      Comments: Right hemiplegia right upper extremity 1/5  Right lower extremity 2/5   Psychiatric:         Mood and Affect: Mood normal.         Behavior: Behavior normal.       Fluids    Intake/Output Summary (Last 24 hours) at 11/13/2022 1415  Last data filed at 11/13/2022 1201  Gross per 24 hour   Intake 880 ml   Output --   Net 880 ml         Laboratory                                Imaging  CT-CHEST (THORAX) WITH   Final Result      1.  No evidence of pulmonary nodule. Questioned pulmonary nodule on abdominal film due to callus formation from subacute/chronic right anterior fourth rib fracture.      2.  Porcelain gallbladder.      Fleischner Society pulmonary nodule recommendations:   Not Applicable         DX-ABDOMEN FOR TUBE PLACEMENT   Final Result      Enteric tube with catheter tip in the left upper quadrant consistent with intragastric location.      MR-BRAIN-W/O   Final Result         Acute infarct in the left frontal, insular and left medial temporal region. Small acute  infarct also noted in the left internal capsule anterior limb and adjacent basal ganglia.      Occlusion of the left distal cervical and petrous ICA.      Remote right parietal infarct.      Age-related volume loss and chronic microvascular ischemic changes.      DX-ABDOMEN FOR TUBE PLACEMENT   Final Result         1.  Nonspecific bowel gas pattern in the upper abdomen.   2.  Nasogastric tube tip terminates overlying the expected location of the pylorus or first duodenal segment.   3.  Atherosclerosis      DX-ABDOMEN FOR TUBE PLACEMENT   Final Result         1.  Nonspecific bowel gas pattern in the upper abdomen.   2.  Nasogastric tube with side-port at the gastroesophageal junction, recommend advancement.   3.  Right midlung nodular density, recommend follow-up CT chest for further characterization.   4.  Atherosclerosis      These findings were discussed with the patient's clinician, Dr. Puente, on 11/4/2022 7:25 AM.      EC-ECHOCARDIOGRAM COMPLETE W/O CONT   Final Result      DX-CHEST-PORTABLE (1 VIEW)   Final Result      No acute cardiac or pulmonary abnormalities are identified.      CT-CTA NECK WITH & W/O-POST PROCESSING   Final Result      1.  Occluded LEFT extracranial internal carotid artery. This could be due to thromboembolic disease or dissection.   2.  Estimated 50-75% stenosis of the proximal RIGHT internal carotid artery   3.  Thyroid nodules      CT-CEREBRAL PERFUSION ANALYSIS   Final Result      1.  Moderate sized completed area of infarction noted in the left frontal parasylvian region.      CT-CTA HEAD WITH & W/O-POST PROCESS   Final Result      1.  Occlusion of the imaged extracranial LEFT internal carotid artery through the cavernous segment with reconstitution of the supraclinoid segment   2.  LEFT M3 branch occlusion            Findings were communicated with and acknowledged by Dr. Sultana via [employer] on 11/2/2022 2:34 PM.      CT-HEAD W/O   Final Result      1.  Moderate sized acute area  of infarction involving the left frontal parasylvian region.      2.  Smaller encephalomalacia thinning to the cortex in the right parietal-occipital region.      3.  Mild age-related cerebral atrophy.         IR-GASTROSTOMY PLACEMENT    (Results Pending)          Assessment/Plan  * Acute CVA (cerebrovascular accident) (HCC)- (present on admission)  Assessment & Plan  MRI noted with left frontal insular temporal infarct    Continue aspirin atorvastatin    Evaluated by neurology with recommendation for Zio patch on discharge    PT/OT recommending postacute placement no accepting SNF discussed with case management  PEG tube placement ordered    Thyroid nodule  Assessment & Plan  Noted on CTA of neck will need outpatient follow-up  TSH normal    Carotid stenosis  Assessment & Plan  Aspirin statin  Outpatient monitoring and follow-up    Vision loss  Assessment & Plan  Will need outpatient ophthalmology evaluation    HLD (hyperlipidemia)  Assessment & Plan  Continue atorvastatin    Type 2 diabetes mellitus (HCC)  Assessment & Plan  With hyperglycemia   Hemoglobin A1c 11.4    Improved control but still not at goal  Will increase Lantus to 38 units and continue sliding scale insulin       VTE prophylaxis: SCDs/TEDs and enoxaparin ppx    I have performed a physical exam and reviewed and updated ROS and Plan today (11/13/2022). In review of yesterday's note (11/12/2022), there are no changes except as documented above.

## 2022-11-13 NOTE — CARE PLAN
Problem: Neuro Status  Goal: Neuro status will remain stable or improve  Outcome: Progressing     Problem: Hemodynamic Monitoring  Goal: Patient's hemodynamics, fluid balance and neurologic status will be stable or improve  Outcome: Progressing   The patient is Stable - Low risk of patient condition declining or worsening    Shift Goals  Clinical Goals: maintain skin integrity, comfort  Patient Goals: khadra  Family Goals: khadra    Progress made toward(s) clinical / shift goals:  neuro status remained stable, hemodynamics have been stable    Patient is not progressing towards the following goals:

## 2022-11-14 PROBLEM — R62.7 FAILURE TO THRIVE IN ADULT: Status: ACTIVE | Noted: 2022-01-01

## 2022-11-14 NOTE — PROGRESS NOTES
IR Nursing Note:    Order received to place a gastrostomy tube.  Dr. Hutton reviewed the images.  Per Dr. Hutton the window to access is very narrow.  Patient would be better treated by surgery.  Primary MD updated.

## 2022-11-14 NOTE — THERAPY
"Physical Therapy   Daily Treatment     Patient Name: Jacqueline Webb  Age:  56 y.o., Sex:  female  Medical Record #: [id number]  Today's Date: 11/14/2022     Precautions  Comments: Fall Risk; Nasogastric Tube    Assessment    Rec't pt in bed, lethargic, sister at bedside and OK\"d PT treatment.  Max assist to sit eob.  Once sitting she is able to sit eob and accept perterbations w/o loss of balance.  Mod assist to stand.  Able to accept weight through BLE's w/o knees buckling, however demonstrates posterior lean.  Mod assist to ambulate several ft to her bedside chair.  Again significant posterior lean.  Nsg to return pt back to bed.      Plan    Continue current treatment plan.    DC Equipment Recommendations: Unable to determine at this time  Discharge Recommendations: Recommend post-acute placement for additional physical therapy services prior to discharge home        Objective       11/14/22 1419   Balance   Sitting Balance (Static) Fair   Sitting Balance (Dynamic) Fair   Standing Balance (Static) Trace +   Standing Balance (Dynamic) Trace +   Weight Shift Sitting Poor   Weight Shift Standing Poor   Skilled Intervention Tactile Cuing;Verbal Cuing;Facilitation   Gait Analysis   Gait Level Of Assist Moderate Assist   Assistive Device None;Hand Held Assist   Distance (Feet) 3   Deviation   (posterior lean)   Skilled Intervention Verbal Cuing;Tactile Cuing;Sequencing;Postural Facilitation   Bed Mobility    Supine to Sit Maximal Assist   Skilled Intervention Verbal Cuing;Tactile Cuing;Sequencing;Facilitation   Functional Mobility   Sit to Stand Moderate Assist   Bed, Chair, Wheelchair Transfer Moderate Assist   Skilled Intervention Verbal Cuing;Tactile Cuing;Sequencing;Facilitation   Short Term Goals    Short Term Goal # 1 Pt will perform supine <> sit with min A within 6 visits to progress bed mobility   Goal Outcome # 1 goal not met   Short Term Goal # 2 Pt will perform STS with min A and LRAD within 6 visits to " progress OOB mobility   Goal Outcome # 2 Goal not met   Short Term Goal # 3 Pt will perform functional transfers with LRAD and min A within 6 visits within 6 visits to progress OOB mobility   Goal Outcome # 3 Goal not met   Short Term Goal # 4 Pt will ambulate 25 ft with LRAD and mod A within 6 visits to initiate gait training   Goal Outcome # 4 Goal not met   Anticipated Discharge Equipment and Recommendations   DC Equipment Recommendations Unable to determine at this time   Discharge Recommendations Recommend post-acute placement for additional physical therapy services prior to discharge home

## 2022-11-14 NOTE — CARE PLAN
The patient is Watcher - Medium risk of patient condition declining or worsening    Shift Goals  Clinical Goals: Maintain skin integrity  Patient Goals: Unable to assess  Family Goals: No family at bedside    Progress made toward(s) clinical / shift goals:    PT NEURO STATUS STABLE    Patient is not progressing towards the following goals:    PT INCONTINENT

## 2022-11-14 NOTE — DISCHARGE PLANNING
1035  Called to speak w/Collette, sister, in regard to applying for Medicaid for pt. Today.  Had to leave message.  Have been to the room x 2 to see if family has arrived yet.  RN going to contact when the family comes in.        1245  Spoke w/Genoveva in regard to applying pt. For Medicaid.  He just spoke with the sister, Collette and the only income pt has is an annuity she receives every month left to her by her father.  Collette is going to the bank tomorrow to find out how much it is so they will be able to see if she qualifies for Medicaid.  He will let me know once everything is done.

## 2022-11-14 NOTE — PROGRESS NOTES
Hospital Medicine Daily Progress Note    Date of Service  11/14/2022    Chief Complaint  Jacqueline Webb is a 56 y.o. female admitted 11/2/2022 with altered mental status    Hospital Course  Jacqueline Webb is a 56 y.o. female who presented 11/2/2022 with a history of type 2 diabetes, high cholesterol, vision loss who presented with altered mental status.  ED patient was found to have a left-sided gaze preference, NIH 25.  Subsequent MRI noted with left frontal insular temporal infarct.  Evaluated by neurology recommended continue aspirin, Lipitor and follow-up with outpatient [facility].  PT/OT recommending postacute placement.         Interval Problem Update    On 2 L nasal cannula afebrile  Lethargic arousable follows command on the left side  Glucose 82 this morning was n.p.o. for possible PEG  IR unable to place PEG consulted GI and discussed with Dr. Urena plan is for PEG tomorrowWe will keep n.p.o. at midnight decrease Lantus and hold Lovenox      I have discussed this patient's plan of care and discharge plan at IDT rounds today with Case Management, Nursing, Nursing leadership, and other members of the IDT team.    Consultants/Specialty  neurology    Code Status  DNAR/DNI    Disposition  Patient is medically cleared for discharge.   Anticipate discharge to to skilled nursing facility.  I have placed the appropriate orders for post-discharge needs.    Review of Systems  Review of Systems   Unable to perform ROS: Medical condition      Physical Exam  Temp:  [36.4 °C (97.5 °F)-36.7 °C (98.1 °F)] 36.5 °C (97.7 °F)  Pulse:  [86-99] 99  Resp:  [17-18] 18  BP: (101-152)/(53-73) 130/72  SpO2:  [90 %-100 %] 90 %    Physical Exam  Vitals and nursing note reviewed.   Constitutional:       General: She is not in acute distress.  HENT:      Head: Normocephalic and atraumatic.      Nose: Nose normal. No rhinorrhea.      Comments: NG in place     Mouth/Throat:      Pharynx: No oropharyngeal exudate or  posterior oropharyngeal erythema.   Eyes:      General: No scleral icterus.        Right eye: No discharge.         Left eye: No discharge.   Cardiovascular:      Rate and Rhythm: Normal rate and regular rhythm.      Heart sounds: Normal heart sounds. No murmur heard.    No friction rub. No gallop.   Pulmonary:      Effort: Pulmonary effort is normal. No respiratory distress.      Breath sounds: Normal breath sounds. No stridor. No wheezing, rhonchi or rales.   Chest:      Chest wall: No tenderness.   Abdominal:      General: Bowel sounds are normal. There is no distension.      Palpations: Abdomen is soft. There is no mass.      Tenderness: There is no abdominal tenderness. There is no rebound.   Musculoskeletal:         General: No swelling or tenderness.      Cervical back: Neck supple. No rigidity.   Skin:     General: Skin is warm and dry.      Coloration: Skin is not cyanotic or jaundiced.      Nails: There is no clubbing.   Neurological:      Mental Status: She is alert.      Cranial Nerves: Cranial nerve deficit present.      Motor: Weakness present.      Comments: Expressive aphasia  Right hemiparesis 1/5     Psychiatric:         Mood and Affect: Mood normal.         Behavior: Behavior normal.       Fluids    Intake/Output Summary (Last 24 hours) at 11/14/2022 1425  Last data filed at 11/14/2022 1200  Gross per 24 hour   Intake 230 ml   Output --   Net 230 ml         Laboratory  Recent Labs     11/13/22  1901   WBC 12.0*   RBC 4.33   HEMOGLOBIN 12.6   HEMATOCRIT 39.4   MCV 91.0   MCH 29.1   MCHC 32.0*   RDW 39.5   PLATELETCT 393   MPV 9.4       Recent Labs     11/14/22  0147   SODIUM 145   POTASSIUM 4.0   CHLORIDE 100   CO2 35*   GLUCOSE 142*   BUN 40*   CREATININE 0.53   CALCIUM 10.2                         Imaging  CT-CHEST (THORAX) WITH   Final Result      1.  No evidence of pulmonary nodule. Questioned pulmonary nodule on abdominal film due to callus formation from subacute/chronic right anterior fourth  rib fracture.      2.  Porcelain gallbladder.      Fleischner Society pulmonary nodule recommendations:   Not Applicable         DX-ABDOMEN FOR TUBE PLACEMENT   Final Result      Enteric tube with catheter tip in the left upper quadrant consistent with intragastric location.      MR-BRAIN-W/O   Final Result         Acute infarct in the left frontal, insular and left medial temporal region. Small acute infarct also noted in the left internal capsule anterior limb and adjacent basal ganglia.      Occlusion of the left distal cervical and petrous ICA.      Remote right parietal infarct.      Age-related volume loss and chronic microvascular ischemic changes.      DX-ABDOMEN FOR TUBE PLACEMENT   Final Result         1.  Nonspecific bowel gas pattern in the upper abdomen.   2.  Nasogastric tube tip terminates overlying the expected location of the pylorus or first duodenal segment.   3.  Atherosclerosis      DX-ABDOMEN FOR TUBE PLACEMENT   Final Result         1.  Nonspecific bowel gas pattern in the upper abdomen.   2.  Nasogastric tube with side-port at the gastroesophageal junction, recommend advancement.   3.  Right midlung nodular density, recommend follow-up CT chest for further characterization.   4.  Atherosclerosis      These findings were discussed with the patient's clinician, Dr. Puente, on 11/4/2022 7:25 AM.      EC-ECHOCARDIOGRAM COMPLETE W/O CONT   Final Result      DX-CHEST-PORTABLE (1 VIEW)   Final Result      No acute cardiac or pulmonary abnormalities are identified.      CT-CTA NECK WITH & W/O-POST PROCESSING   Final Result      1.  Occluded LEFT extracranial internal carotid artery. This could be due to thromboembolic disease or dissection.   2.  Estimated 50-75% stenosis of the proximal RIGHT internal carotid artery   3.  Thyroid nodules      CT-CEREBRAL PERFUSION ANALYSIS   Final Result      1.  Moderate sized completed area of infarction noted in the left frontal parasylvian region.      CT-CTA HEAD  WITH & W/O-POST PROCESS   Final Result      1.  Occlusion of the imaged extracranial LEFT internal carotid artery through the cavernous segment with reconstitution of the supraclinoid segment   2.  LEFT M3 branch occlusion            Findings were communicated with and acknowledged by Dr. Sultana via [employer] on 11/2/2022 2:34 PM.      CT-HEAD W/O   Final Result      1.  Moderate sized acute area of infarction involving the left frontal parasylvian region.      2.  Smaller encephalomalacia thinning to the cortex in the right parietal-occipital region.      3.  Mild age-related cerebral atrophy.                Assessment/Plan  * Acute CVA (cerebrovascular accident) (HCC)- (present on admission)  Assessment & Plan  MRI noted with left frontal insular temporal infarct    Continue aspirin atorvastatin    Evaluated by neurology with recommendation for Zio patch on discharge    PT/OT recommending postacute placement no accepting SNF discussed with case management who will follow up with family about applying for Medicaid  PEG tube placement planned for tomorrow with GI    Thyroid nodule  Assessment & Plan  Noted on CTA of neck will need outpatient follow-up  TSH normal    Carotid stenosis  Assessment & Plan  Aspirin statin  Outpatient monitoring and follow-up    Vision loss  Assessment & Plan  Will need outpatient ophthalmology evaluation    HLD (hyperlipidemia)  Assessment & Plan  Continue atorvastatin    Type 2 diabetes mellitus (HCC)  Assessment & Plan     Hemoglobin A1c 11.4    Patient will be n.p.o. for PEG tube placement tomorrow we will hold scheduled dose of Lantus 38 units and will give 15 units tonight to avoid hypoglycemia  Continue sliding-scale insulin monitor CBGs       VTE prophylaxis: SCDs/TEDs and enoxaparin ppx    I have performed a physical exam and reviewed and updated ROS and Plan today (11/14/2022). In review of yesterday's note (11/13/2022), there are no changes except as documented  above.

## 2022-11-15 NOTE — ANESTHESIA PREPROCEDURE EVALUATION
Case: [id number] Date/Time: 11/15/22 0845    Procedure: GASTROSCOPY WITH PEG TUBE PLACEMENT (Esophagus)    Anesthesia type: General    Location: [facility] ROOM 26 / SURGERY SAME DAY [facility]    Surgeons: James Urena M.D.        Nonverbal, follows commands    Relevant Problems   NEURO   (positive) Acute CVA (cerebrovascular accident) (HCC)      CARDIAC   (positive) Carotid stenosis      ENDO   (positive) Type 2 diabetes mellitus (HCC)       Physical Exam    Airway - unable to assess  TM distance: <3 FB  Neck ROM: full       Cardiovascular - normal exam  Rhythm: regular  Rate: normal  (-) murmur     Dental - normal exam           Pulmonary - normal exam  Breath sounds clear to auscultation     Abdominal    Neurological - normal exam                 Anesthesia Plan    ASA 4   ASA physical status 4 criteria: CVA or TIA - recent (< 3 months)    Plan - MAC               Induction: intravenous          Informed Consent:    Anesthetic plan and risks discussed with healthcare power of .

## 2022-11-15 NOTE — PROGRESS NOTES
Med Rec Complete per patient's family member at bedside  (sister Collette)  Allergies Reviewed   No antibiotics within the last 30 days  Patient's Preferred Pharmacy: [facility] in [place], [place]    Patient's sister reports that the patient does not currently take any prescription medications.

## 2022-11-15 NOTE — OR NURSING
0949 Patient arrived from OR. ID verified. Report received. Attached to monitors. Patient sleep. 4l 02 mask respirations even and non-labored. Vss  1009 Report given to Jennifer MALIK S183 all questions answered.   1038 Patient transported to room with all personal belongings.

## 2022-11-15 NOTE — ANESTHESIA PROCEDURE NOTES
Peripheral IV    Date/Time: 11/15/2022 9:20 AM  Performed by: Holger Segal M.D.  Authorized by: Holger Segal M.D.     Size:  22 G  Laterality:  Left  Peripheral IV Location:  Wrist  Site Prep:  Alcohol  Technique:  Direct puncture  Attempts:  1  Difficult IV necessitating physician skill: IV access difficult

## 2022-11-15 NOTE — ANESTHESIA POSTPROCEDURE EVALUATION
Patient: Jacqueline Webb    Procedure Summary     Date: 11/15/22 Room / Location: [facility] ROOM 26 / SURGERY SAME DAY [facility]    Anesthesia Start: 0917 Anesthesia Stop: 0955    Procedure: GASTROSCOPY WITH PEG TUBE PLACEMENT (Esophagus) Diagnosis: (gastritis, s/p PEG placement)    Surgeons: Jamse Urena M.D. Responsible Provider: Holger Segal M.D.    Anesthesia Type: MAC ASA Status: 4          Final Anesthesia Type: MAC  Last vitals  BP   Blood Pressure: 126/62    Temp   36.2 °C (97.2 °F)    Pulse   75   Resp   18    SpO2   97 %      Anesthesia Post Evaluation    Patient location during evaluation: PACU  Patient participation: complete - patient participated  Level of consciousness: awake and alert    Airway patency: patent  Anesthetic complications: no  Cardiovascular status: hemodynamically stable  Respiratory status: acceptable  Hydration status: euvolemic    PONV: none          There were no known notable events for this encounter.     Nurse Pain Score: 0 (NPRS)

## 2022-11-15 NOTE — CARE PLAN
The patient is Stable - Low risk of patient condition declining or worsening    Shift Goals  Clinical Goals: Maintain skin integrity  Patient Goals: DALE  Family Goals: No family at bedside    Progress made toward(s) clinical / shift goals:    Problem: Neuro Status  Goal: Neuro status will remain stable or improve  Outcome: Progressing  Note: Neuro status remains stable.     Problem: Skin Integrity  Goal: Skin integrity is maintained or improved  Outcome: Progressing  Note: Pt on waffle overlay, q2 turns in place, pillows in use for repositioning, heel float boots on.

## 2022-11-15 NOTE — CONSULTS
Date of Consultation:  11/15/2022    Patient: : Jacqueline Webb  MRN: [id number]    Referring Physician: Dr. Olivia     GI:James Urena M.D.     Reason for Consultation: PEG tube because of dysphagia post CVA    History of Present Illness: Jacqueline is a 56-year-old woman who unfortunately presented with an acute CVA.  MRI showed left frontal insular temporal infarct and she is having dysphagia, is an aspiration risk and PEG tube was requested.    Otherwise the patient is doing fine without complaints of fever/ chills/ weight loss/ appetite change/ dysphagia/ odynophagia/ heartburn/ nausea/ vomiting/ bloating/ constipation/ melena/ hematochezia or abdominal pain.      Patient has no known allergies.       Past Medical History:   Diagnosis Date    Diabetes (HCC)          History reviewed. No pertinent surgical history.      History reviewed. No pertinent family history.      Social History     Socioeconomic History    Marital status: Single         Review of Systems   Unable to perform ROS: Mental status change       Physical Exam:  Vitals:    11/14/22 1541 11/14/22 1938 11/15/22 0336 11/15/22 0726   BP: 125/71 121/68 128/69 114/61   Pulse: 97 80 85 82   Resp: 18 18 18 13   Temp: 36.4 °C (97.5 °F) 36.2 °C (97.2 °F) 36.9 °C (98.4 °F) 36.3 °C (97.3 °F)   TempSrc: Temporal Temporal Temporal Temporal   SpO2: 94% 97% 92% 95%   Weight:       Height:           Physical Exam  General appearance: No apparent distress.  HEENT: Left gaze preference  CVS: S1-S2 normal, RRR.  RS: Good air entry bilaterally.  Abdomen: Soft, nontender, nondistended, no guarding.  No organomegaly.  CNS: Not oriented unable to follow simple commands  Extremities: No edema.  Rectal: Deferred.       Labs:  Recent Labs     11/13/22  1901   WBC 12.0*   RBC 4.33   HEMOGLOBIN 12.6   HEMATOCRIT 39.4   MCV 91.0   MCH 29.1   MCHC 32.0*   RDW 39.5   PLATELETCT 393   MPV 9.4     Recent Labs     11/14/22  0147   SODIUM 145   POTASSIUM 4.0   CHLORIDE 100   CO2 35*    GLUCOSE 142*   BUN 40*                   Imaging:  CT-CHEST (THORAX) WITH  Narrative: 11/5/2022 6:18 PM    HISTORY/REASON FOR EXAM:  Lung nodule, > 8mm.    TECHNIQUE/EXAM DESCRIPTION:  CT scan of the chest with contrast.    Thin-section helical images were obtained from the lung apices through the adrenal glands following the bolus administration of contrast. 75 mL of Omnipaque 350 nonionic contrast was utilized.    Low dose optimization technique was utilized for this CT exam including automated exposure control and adjustment of the mA and/or kV according to patient size.    COMPARISON:  Abdominal x-ray November 3, 2022    FINDINGS:  Lungs: No nodules or airspace process. The questioned right lung pulmonary nodule is due to callus formation related to a subacute/chronic fracture of the anterior aspect of the right fourth rib.    Mediastinum/Jen: No significant adenopathy.    Pleura: No pleural effusion.    Cardiac: Heart normal in size without pericardial effusion.    Vascular: Unremarkable.    Soft tissues: Unremarkable.    Bones: No acute or destructive process. There are subacute to chronic right anterior fourth and fifth rib fractures.    Upper abdomen: There is gallbladder wall calcification consistent with porcelain gallbladder. NG tube identified with tip in the gastric body  Impression: 1.  No evidence of pulmonary nodule. Questioned pulmonary nodule on abdominal film due to callus formation from subacute/chronic right anterior fourth rib fracture.    2.  Porcelain gallbladder.    Fleischner Society pulmonary nodule recommendations:  Not Applicable            Impressions:  1.  Dysphagia due to CVA.  2.  Failure to thrive.      Recommendations:  Had a detailed discussion with the patient care team.  Patient needs PEG tube placement and will schedule tomorrow.  Ancef on-call to the OR.      This note was generated using voice recognition software which has a small chance of producing errors of grammar and  possibly content. I have made every reasonable attempt to find and correct any obvious errors, but expect that some may not be found prior to finalization of this note.

## 2022-11-15 NOTE — CARE PLAN
The patient is Stable - Low risk of patient condition declining or worsening    Shift Goals  Clinical Goals: PEG placement 11/15  Patient Goals: DALE  Family Goals: DALE      Problem: Optimal Care of the Stroke Patient  Goal: Optimal emergency care for the stroke patient  Outcome: Progressing     Problem: Discharge Planning - Stroke  Goal: Patient’s continuum of care needs will be met  Outcome: Progressing     Problem: Neuro Status  Goal: Neuro status will remain stable or improve  Outcome: Progressing     Problem: Risk for Aspiration  Goal: Patient's risk for aspiration will be absent or decrease  Outcome: Progressing

## 2022-11-15 NOTE — PROGRESS NOTES
While attempting to do oral care/skin care around NGT, this RN noticed that the skin around the NGT looked very red/painful at site of nasal bridal. Upon further inspection, this RN noticed that the string from the nasal bridle was imbedded in the skin of the patients right nare. Per NGT LDA, tube has been in place since 11/4. Called in Charge HELENA Pierre to bedside to assess. Reported to provider BARON Hu, and after collaborative discussion, I was given verbal order to remove NGT to do wound care. After NGT and bridal removed, it was revealed that the nasal bridal string was imbedded across the septum and into the left nare. Gentle cleansing with saline was done, patient non-verbal but was grimacing and crying. Pictures taken and LDA created. See wound images below.     Also of note, patient prepped for pre-op and report given to pre-op nurse Eryn. Gave Eryn update about reason for NGT removal.     Addendum: unable to place wound consult while patient is pre-op. Will pass on to day HELENA Rodriguez.

## 2022-11-15 NOTE — OP REPORT
PATIENT: Jacqueline Webb  1965      Location : [facility]    PREOPERATIVE DIAGNOSIS/INDICATION: Dysphagia due to CVA, failure to thrive, needs PEG for enteral nutrition    POSTOPERATIVE DIAGNOSES: 1.  Erosive hemorrhagic gastritis noted in the stomach otherwise normal EGD.  2.  Successful placement of PEG tube in the gastric body with the bumper at 4 cm.    PROCEDURE:  EGD WITH PEG placement.    SURGEON: James Urena MD    ANESTHESIA:  MAC.    CONSENT:  After confirming the patient's identity, the procedure was explained in detail to the patient. Risks of the procedure including but not limited to bleeding, infection, perforation, sedation risks and risks of missed lesions were explained to the patient. Patient has signed informed consent.    DESCRIPTION:  The patient presented to the procedure room.  After routine checkup was performed, the patient was brought into the endoscopy suite, placed in the left lateral decubitus position and was sedated by anesthesia.  Vital signs were monitored throughout the procedure.  Oxygenation support was provided throughout the procedure. Upper endoscope was inserted into patient's mouth and advanced toward the second portion of the duodenum under direct visualization.  Once the site was reached and examined, the upper endoscope was withdrawn.  Retroflexion was made within the gastric cardia.  The patient tolerated the procedure well.  There were no immediate complications.    PEG placement: An appropriate site was selected and the stomach was trans-illuminated and an optimal position for the PEG tube was identified using good 1:1 transmission method. The skin was infiltrated with local anesthetic and the trocar and sheath were inserted through the abdomen into the stomach under direct visualization. The needle was removed and a guidewire was inserted through the sheath. The guidewire was grasped within the stomach with a snare. It was removed completely out of the mouth and  the PEG tube was secured to the guidewire.    The guidewire and PEG tube were then pulled through the mouth and esophagus and snug to the abdominal wall.Bumper was at 4 cm at the level of the skin. There was no evidence of bleeding. Gastroscope was reintroduced to look for adequate positioning of the PEG and for complications. No complications or bleeding seen. The Bolster was placed on the PEG site. The patient tolerated the procedure well and was transferred to recovery room in stable condition    Medications given: Ancef 2 gram IVPB for prophylaxis      RECOMMENDATIONS:    1. PEG placed without complication.    Please ensure proper PEG care and tube feeding:  - keep head of bed elevated to at least 30 degrees during tube feeding. The largest factor in preventing aspiration during tube feeding is not the placement of the tube into the stomach or jejunum, but keeping the head elevated to at least 30 degrees during feeding  - Medications and water can be started 6 hours after placement. Tube feeds can be started 12 hours after placement.  - Gauze over the bumper, not under. Gauze under bumper may increase pressure on tract and impair maturation.  - PEG needs 0.5-1cm of laxity freely mobile in and out of the gastrostomy tract. Overtightening may impair tract maturation by inducing localized ischemia. Over tightening may also result in buried bumper syndrome.  - PEG needs to be freely rotational 360 degrees. See above as over tightening prevents free rotation.  - Clean site with half-strength hydrogen peroxide for the first three days, then daily with soap and water thereafter.    2. No NSAIDS, anti-platelet or anti-coagulant for 7 days

## 2022-11-15 NOTE — PROGRESS NOTES
Hospital Medicine Daily Progress Note    Date of Service  11/15/2022    Chief Complaint  Jacqueline Webb is a 56 y.o. female admitted 11/2/2022 with altered mental status    Hospital Course  Jacqueline Webb is a 56 y.o. female who presented 11/2/2022 with a history of type 2 diabetes, high cholesterol, vision loss who presented with altered mental status.  ED patient was found to have a left-sided gaze preference, NIH 25.  Subsequent MRI noted with left frontal insular temporal infarct.  Evaluated by neurology recommended continue aspirin, Lipitor and follow-up with outpatient [facility].  PT/OT recommending postacute placement.         Interval Problem Update    On 2 L nasal cannula afebrile  Lethargic arousable follows command on the left side  Glucose 82 this morning was n.p.o. for possible PEG  IR unable to place PEG consulted GI and discussed with Dr. Urena plan is for PEG tomorrowWe will keep n.p.o. at midnight decrease Lantus and hold Lovenox    11/15: PEG placed this morning.  Patient tolerated procedure well.  No overnight events.  Resume anticoagulation tomorrow.  GI will clear patient when PEG can be used.  Glucose was 141 this morning.    I have discussed this patient's plan of care and discharge plan at IDT rounds today with Case Management, Nursing, Nursing leadership, and other members of the IDT team.    Consultants/Specialty  neurology    Code Status  DNAR/DNI    Disposition  Patient is medically cleared for discharge.   Anticipate discharge to to skilled nursing facility.  I have placed the appropriate orders for post-discharge needs.    Review of Systems  Review of Systems   Unable to perform ROS: Medical condition      Physical Exam  Temp:  [36.1 °C (97 °F)-36.9 °C (98.4 °F)] 36.2 °C (97.2 °F)  Pulse:  [75-97] 75  Resp:  [13-18] 18  BP: (105-129)/(55-71) 129/71  SpO2:  [90 %-100 %] 96 %    Physical Exam  Vitals and nursing note reviewed.   Constitutional:       General: She is not  in acute distress.  HENT:      Head: Normocephalic and atraumatic.      Nose: Nose normal. No rhinorrhea.      Comments: NG in place     Mouth/Throat:      Pharynx: No oropharyngeal exudate or posterior oropharyngeal erythema.   Eyes:      General: No scleral icterus.        Right eye: No discharge.         Left eye: No discharge.   Cardiovascular:      Rate and Rhythm: Normal rate and regular rhythm.      Heart sounds: Normal heart sounds. No murmur heard.    No friction rub. No gallop.   Pulmonary:      Effort: Pulmonary effort is normal. No respiratory distress.      Breath sounds: Normal breath sounds. No stridor. No wheezing, rhonchi or rales.   Chest:      Chest wall: No tenderness.   Abdominal:      General: Bowel sounds are normal. There is no distension.      Palpations: Abdomen is soft. There is no mass.      Tenderness: There is no abdominal tenderness. There is no rebound.   Musculoskeletal:         General: No swelling or tenderness.      Cervical back: Neck supple. No rigidity.   Skin:     General: Skin is warm and dry.      Coloration: Skin is not cyanotic or jaundiced.      Nails: There is no clubbing.   Neurological:      Mental Status: She is alert.      Cranial Nerves: Cranial nerve deficit present.      Motor: Weakness present.      Comments: Expressive aphasia  Right hemiparesis 1/5     Psychiatric:         Mood and Affect: Mood normal.         Behavior: Behavior normal.       Fluids    Intake/Output Summary (Last 24 hours) at 11/15/2022 1437  Last data filed at 11/15/2022 0955  Gross per 24 hour   Intake 1190 ml   Output --   Net 1190 ml         Laboratory  Recent Labs     11/13/22  1901   WBC 12.0*   RBC 4.33   HEMOGLOBIN 12.6   HEMATOCRIT 39.4   MCV 91.0   MCH 29.1   MCHC 32.0*   RDW 39.5   PLATELETCT 393   MPV 9.4       Recent Labs     11/14/22  0147   SODIUM 145   POTASSIUM 4.0   CHLORIDE 100   CO2 35*   GLUCOSE 142*   BUN 40*   CREATININE 0.53   CALCIUM 10.2                          Imaging  CT-CHEST (THORAX) WITH   Final Result      1.  No evidence of pulmonary nodule. Questioned pulmonary nodule on abdominal film due to callus formation from subacute/chronic right anterior fourth rib fracture.      2.  Porcelain gallbladder.      Fleischner Society pulmonary nodule recommendations:   Not Applicable         DX-ABDOMEN FOR TUBE PLACEMENT   Final Result      Enteric tube with catheter tip in the left upper quadrant consistent with intragastric location.      MR-BRAIN-W/O   Final Result         Acute infarct in the left frontal, insular and left medial temporal region. Small acute infarct also noted in the left internal capsule anterior limb and adjacent basal ganglia.      Occlusion of the left distal cervical and petrous ICA.      Remote right parietal infarct.      Age-related volume loss and chronic microvascular ischemic changes.      DX-ABDOMEN FOR TUBE PLACEMENT   Final Result         1.  Nonspecific bowel gas pattern in the upper abdomen.   2.  Nasogastric tube tip terminates overlying the expected location of the pylorus or first duodenal segment.   3.  Atherosclerosis      DX-ABDOMEN FOR TUBE PLACEMENT   Final Result         1.  Nonspecific bowel gas pattern in the upper abdomen.   2.  Nasogastric tube with side-port at the gastroesophageal junction, recommend advancement.   3.  Right midlung nodular density, recommend follow-up CT chest for further characterization.   4.  Atherosclerosis      These findings were discussed with the patient's clinician, Dr. Puente, on 11/4/2022 7:25 AM.      EC-ECHOCARDIOGRAM COMPLETE W/O CONT   Final Result      DX-CHEST-PORTABLE (1 VIEW)   Final Result      No acute cardiac or pulmonary abnormalities are identified.      CT-CTA NECK WITH & W/O-POST PROCESSING   Final Result      1.  Occluded LEFT extracranial internal carotid artery. This could be due to thromboembolic disease or dissection.   2.  Estimated 50-75% stenosis of the proximal RIGHT  internal carotid artery   3.  Thyroid nodules      CT-CEREBRAL PERFUSION ANALYSIS   Final Result      1.  Moderate sized completed area of infarction noted in the left frontal parasylvian region.      CT-CTA HEAD WITH & W/O-POST PROCESS   Final Result      1.  Occlusion of the imaged extracranial LEFT internal carotid artery through the cavernous segment with reconstitution of the supraclinoid segment   2.  LEFT M3 branch occlusion            Findings were communicated with and acknowledged by Dr. Sultana via Voalte Me on 11/2/2022 2:34 PM.      CT-HEAD W/O   Final Result      1.  Moderate sized acute area of infarction involving the left frontal parasylvian region.      2.  Smaller encephalomalacia thinning to the cortex in the right parietal-occipital region.      3.  Mild age-related cerebral atrophy.                Assessment/Plan  * Acute CVA (cerebrovascular accident) (HCC)- (present on admission)  Assessment & Plan  MRI noted with left frontal insular temporal infarct    Continue aspirin atorvastatin    Evaluated by neurology with recommendation for Zio patch on discharge    PT/OT recommending postacute placement no accepting SNF discussed with case management who will follow up with family about applying for Medicaid  S/p peg     Thyroid nodule  Assessment & Plan  Noted on CTA of neck will need outpatient follow-up  TSH normal    Carotid stenosis  Assessment & Plan  Aspirin statin  Outpatient monitoring and follow-up    Vision loss  Assessment & Plan  Will need outpatient ophthalmology evaluation    HLD (hyperlipidemia)  Assessment & Plan  Continue atorvastatin    Type 2 diabetes mellitus (HCC)  Assessment & Plan     Hemoglobin A1c 11.4    Patient will be n.p.o. for PEG tube placement tomorrow we will hold scheduled dose of Lantus 38 units and will give 15 units tonight to avoid hypoglycemia  Continue sliding-scale insulin monitor CBGs         VTE prophylaxis: SCDs/TEDs and enoxaparin ppx    I have  performed a physical exam and reviewed and updated ROS and Plan today (11/15/2022). In review of yesterday's note (11/14/2022), there are no changes except as documented above.

## 2022-11-16 NOTE — THERAPY
Physical Therapy   Daily Treatment     Patient Name: Jacqueline Webb  Age:  56 y.o., Sex:  female  Medical Record #: [id number]  Today's Date: 11/16/2022     Precautions  Precautions: Fall Risk;Nasogastric Tube    Assessment    Therapist attempted to wake pt by performing ankle/knee/hip flexion ROM and calf stretches, pt wiggling toes at times. A few times throughout efforts patient minimally responded but not opening eyes or responding enough to sit EOB. Positioning pillows placed under UE. Family is very supportive and often present in the room. Pt continues to be limited by inattention and impaired cognition, pt will continue to benefit from skilled PT to address deficits.     Plan    Continue current treatment plan.    DC Equipment Recommendations: Unable to determine at this time  Discharge Recommendations: Recommend post-acute placement for additional physical therapy services prior to discharge home       11/16/22 1401   Other Treatments   Other Treatments Provided Therapist attempted to wake patient with assist from younger brother, a few times throughout efforts patient minimally responded but not opening eyes. Therapist performed ankle/knee/hip flexion stretches and calf stretches, pt wiggling toes at times. positioning pillows placed under UE.   Gait Analysis   Gait Level Of Assist Unable to Participate   Bed Mobility    Supine to Sit Unable to Participate   Functional Mobility   Sit to Stand Unable to Participate   Short Term Goals    Short Term Goal # 1 Pt will perform supine <> sit with min A within 6 visits to progress bed mobility   Goal Outcome # 1 goal not met   Short Term Goal # 2 Pt will perform STS with min A and LRAD within 6 visits to progress OOB mobility   Goal Outcome # 2 Goal not met   Short Term Goal # 3 Pt will perform functional transfers with LRAD and min A within 6 visits within 6 visits to progress OOB mobility   Goal Outcome # 3 Goal not met   Short Term Goal # 4 Pt will ambulate 25 ft  with LRAD and mod A within 6 visits to initiate gait training   Goal Outcome # 4 Goal not met   Supervising Physical Therapist (PTA Treatments Only)   Supervising Physical Therapist Yamile Buchanan

## 2022-11-16 NOTE — THERAPY
Missed Therapy     Patient Name: Jacqueline Webb  Age:  56 y.o., Sex:  female  Medical Record #: [id number]  Today's Date: 11/16/2022    Attempted to see pt for OT tx. Pt currently working w/ SLP. Will try again later as able.

## 2022-11-16 NOTE — PROGRESS NOTES
Gastroenterology Progress Note               Author:  REYNALDO Lynn Date & Time Created: 11/16/2022 1:34 PM       Patient ID:  Name:             Jacqueline Webb    YOB: 1965  Age:                 56 y.o.  female  MRN:               [id number]        Medical Decision Making, by Problem:  Active Hospital Problems    Diagnosis     Failure to thrive in adult [R62.7]     Carotid stenosis [I65.29]     Thyroid nodule [E04.1]     Acute CVA (cerebrovascular accident) (HCC) [I63.9]     Type 2 diabetes mellitus (HCC) [E11.9]     HLD (hyperlipidemia) [E78.5]     Vision loss [H54.7]            Presenting Chief Complaint:  Difficulty speaking; AMS      Interval History:  Patient seen and examined. She underwent PEG tube on 11/15. Patient is resting with brother at bedside. She is tolerating diet through PEG tube. PEG tube incision site looks good, intact.       Hospital Medications:  Current Facility-Administered Medications   Medication Dose Frequency Provider Last Rate Last Admin    [Held by provider] insulin GLARGINE (Lantus,Semglee) injection  38 Units Q EVENING Dutch Mai M.D.   38 Units at 11/13/22 1830    insulin regular (HumuLIN R,NovoLIN R) injection  3-14 Units Q6HRS Dutch Mai M.D.   4 Units at 11/16/22 1253    And    dextrose 10 % BOLUS 25 g  25 g Q15 MIN PRN Dutch Mai M.D.   25 g at 11/15/22 1706    diphenhydrAMINE-zinc acetate (BENADRYL ITCH) topical cream   4X/DAY PRN REYNALDO Real   Given at 11/10/22 0028    atorvastatin (LIPITOR) tablet 80 mg  80 mg Q EVENING Dutch Mai M.D.   80 mg at 11/15/22 1800    aspirin (ASA) chewable tab 81 mg  81 mg DAILY Uvaldo Puente M.D.   81 mg at 11/16/22 0533    lisinopril (PRINIVIL) tablet 5 mg  5 mg Q DAY Uvaldo Puente M.D.   5 mg at 11/16/22 0533    acetaminophen (Tylenol) tablet 650 mg  650 mg Q6HRS PRN Uvaldo Puente M.D.   650 mg at 11/12/22 0447    ondansetron (ZOFRAN ODT) dispertab 4 mg  4 mg Q4HRS  "SPENCER Puente M.D.        polyethylene glycol/lytes (MIRALAX) PACKET 1 Packet  1 Packet QDAY SPENCER Puente M.D.   1 Packet at 11/08/22 0552    And    magnesium hydroxide (MILK OF MAGNESIA) suspension 30 mL  30 mL QDAY SPENCER Puente M.D.   30 mL at 11/08/22 0552    And    bisacodyl (DULCOLAX) suppository 10 mg  10 mg QDAY PRSKIP Puente M.D.   10 mg at 11/08/22 0612    promethazine (PHENERGAN) tablet 12.5-25 mg  12.5-25 mg Q4HRS PRSKIP Puente M.D.        [Held by provider] enoxaparin (Lovenox) inj 40 mg  40 mg DAILY AT 1800 Uvaldo Puente M.D.   40 mg at 11/13/22 1830    ondansetron (ZOFRAN) syringe/vial injection 4 mg  4 mg Q4HRS PRN Sánchez Olivia D.O.        promethazine (PHENERGAN) suppository 12.5-25 mg  12.5-25 mg Q4HRS PRSKIP Olivia D.O.        prochlorperazine (COMPAZINE) injection 5-10 mg  5-10 mg Q4HRS PRSKIP Olivia D.O.       Last reviewed on 11/14/2022  4:55 PM by Leslie Weaver       Vital signs:  Weight/BMI: Body mass index is 28.34 kg/m².  /66   Pulse 84   Temp 36.7 °C (98.1 °F) (Temporal)   Resp 18   Ht 1.473 m (4' 9.99\")   Wt 61.5 kg (135 lb 9.3 oz)   SpO2 94%   Vitals:    11/15/22 1908 11/16/22 0322 11/16/22 0722 11/16/22 1300   BP: 126/70 123/68 123/66    Pulse: 79 76 84    Resp: 18 18 18    Temp: 36.5 °C (97.7 °F) 36.5 °C (97.7 °F) 36.7 °C (98.1 °F)    TempSrc: Temporal Temporal Temporal    SpO2: 100% 98% 94%    Weight:       Height:    1.473 m (4' 9.99\")     Oxygen Therapy:  Pulse Oximetry: 94 %, O2 (LPM): 2, O2 Delivery Device: Oxymask    Intake/Output Summary (Last 24 hours) at 11/16/2022 1334  Last data filed at 11/16/2022 0401  Gross per 24 hour   Intake 400 ml   Output 0 ml   Net 400 ml         Physical Exam  HENT:      Mouth/Throat:      Mouth: Mucous membranes are dry.   Eyes:      General: No scleral icterus.  Cardiovascular:      Rate and Rhythm: Normal rate and regular rhythm.   Pulmonary:      Effort: Pulmonary effort is normal.      Breath " sounds: Normal breath sounds.   Abdominal:      General: Bowel sounds are normal. There is no distension.   Skin:     General: Skin is warm and dry.      Coloration: Skin is not jaundiced.   Neurological:      Mental Status: She is disoriented.      Comments: Not alert           Labs:  Recent Labs     11/14/22  0147   SODIUM 145   POTASSIUM 4.0   CHLORIDE 100   CO2 35*   BUN 40*   CREATININE 0.53   CALCIUM 10.2     Recent Labs     11/14/22  0147   PREALBUMIN 18.2   GLUCOSE 142*     Recent Labs     11/13/22  1901   WBC 12.0*     Recent Labs     11/13/22 1901   RBC 4.33   HEMOGLOBIN 12.6   HEMATOCRIT 39.4   PLATELETCT 393     Recent Results (from the past 24 hour(s))   POCT glucose device results    Collection Time: 11/15/22  4:49 PM   Result Value Ref Range    POC Glucose, Blood 65 65 - 99 mg/dL   POCT glucose device results    Collection Time: 11/15/22  5:45 PM   Result Value Ref Range    POC Glucose, Blood 225 (H) 65 - 99 mg/dL   POCT glucose device results    Collection Time: 11/15/22 11:51 PM   Result Value Ref Range    POC Glucose, Blood 102 (H) 65 - 99 mg/dL   POCT glucose device results    Collection Time: 11/16/22  5:29 AM   Result Value Ref Range    POC Glucose, Blood 172 (H) 65 - 99 mg/dL   POCT glucose device results    Collection Time: 11/16/22 12:47 PM   Result Value Ref Range    POC Glucose, Blood 207 (H) 65 - 99 mg/dL       Radiology Review:  CT-CHEST (THORAX) WITH   Final Result      1.  No evidence of pulmonary nodule. Questioned pulmonary nodule on abdominal film due to callus formation from subacute/chronic right anterior fourth rib fracture.      2.  Porcelain gallbladder.      Fleischner Society pulmonary nodule recommendations:   Not Applicable         DX-ABDOMEN FOR TUBE PLACEMENT   Final Result      Enteric tube with catheter tip in the left upper quadrant consistent with intragastric location.      MR-BRAIN-W/O   Final Result         Acute infarct in the left frontal, insular and left medial  temporal region. Small acute infarct also noted in the left internal capsule anterior limb and adjacent basal ganglia.      Occlusion of the left distal cervical and petrous ICA.      Remote right parietal infarct.      Age-related volume loss and chronic microvascular ischemic changes.      DX-ABDOMEN FOR TUBE PLACEMENT   Final Result         1.  Nonspecific bowel gas pattern in the upper abdomen.   2.  Nasogastric tube tip terminates overlying the expected location of the pylorus or first duodenal segment.   3.  Atherosclerosis      DX-ABDOMEN FOR TUBE PLACEMENT   Final Result         1.  Nonspecific bowel gas pattern in the upper abdomen.   2.  Nasogastric tube with side-port at the gastroesophageal junction, recommend advancement.   3.  Right midlung nodular density, recommend follow-up CT chest for further characterization.   4.  Atherosclerosis      These findings were discussed with the patient's clinician, Dr. Puente, on 11/4/2022 7:25 AM.      EC-ECHOCARDIOGRAM COMPLETE W/O CONT   Final Result      DX-CHEST-PORTABLE (1 VIEW)   Final Result      No acute cardiac or pulmonary abnormalities are identified.      CT-CTA NECK WITH & W/O-POST PROCESSING   Final Result      1.  Occluded LEFT extracranial internal carotid artery. This could be due to thromboembolic disease or dissection.   2.  Estimated 50-75% stenosis of the proximal RIGHT internal carotid artery   3.  Thyroid nodules      CT-CEREBRAL PERFUSION ANALYSIS   Final Result      1.  Moderate sized completed area of infarction noted in the left frontal parasylvian region.      CT-CTA HEAD WITH & W/O-POST PROCESS   Final Result      1.  Occlusion of the imaged extracranial LEFT internal carotid artery through the cavernous segment with reconstitution of the supraclinoid segment   2.  LEFT M3 branch occlusion            Findings were communicated with and acknowledged by Dr. Sultana via Voalte Me on 11/2/2022 2:34 PM.      CT-HEAD W/O   Final Result      1.   Moderate sized acute area of infarction involving the left frontal parasylvian region.      2.  Smaller encephalomalacia thinning to the cortex in the right parietal-occipital region.      3.  Mild age-related cerebral atrophy.               MDM (Data Review):   -Records reviewed and summarized in current documentation  -I personally reviewed and interpreted the laboratory results  -I personally reviewed the radiology images    Assessment/Recommendations:  #Dysphagia s/p CVA  Had PEG tube placed yesterday 11/15. She is tolerating tube feeds. No issues with PEG surgical/incision site. VSS      GI will sign off. Please call GI service if re-consult needed. Thank you.        Core Quality Measures   Reviewed items::  Labs, Medications and Radiology reports reviewed

## 2022-11-16 NOTE — DIETARY
Nutrition support weekly update:  Day 14 of admit.  Jacqueline Webb is a 56 y.o. female with admitting DX of Acute CVA (cerebrovascular accident) (HCC), Failure to thrive in adult.      Tube feeding initiated on 11/4. Current TF via PEG is Diabetisource AC, goal rate 50 ml/hr ml/hr, providing 1440 kcals, 72 grams protein, 984 mL free water.    Assessment:  Weight 61.5 kg via bed scale.  Re-estimate of nutritional needs as indicated:  MSJ * 1 - 1.1 = 1085 -  1194 kcal/day  Protein: 61 - 79 g (1.0 - 1.3 g/kg of ABW)     Evaluation:  Pt had PEG placed yesterday; consult from MD for tube feed post PEG placement.  Patient tolerating tube feeds.  Current clinical picture and progress notes reviewed.  Labs: Glucose 142 (H). Bun 40 (H). A1C on 11/3 11.4 (H).   Meds: Glargine. SSI. BM protocol.  Last BM: 11/13  CHO controlled specialty formula justified as Pt has hx of T2DM.      Malnutrition risk: No new criteria identified.    Recommendations/Plan:  Transition continuous tube feeds to bolus feeds. Recommend a goal of 4 cartons of Diabetisource AC per day. Start with 125 mL (or half a carton) at first meal and increase by 125 mL (or half a carton) until a goal of 4 cartons/day is reached. Recommend 1 carton at breakfast, 1 carton at lunch, 1 carton at dinner, and 1 carton at HS. Diabetisource AC, goal rate 4 cartons, providing 1200 kcals, 60 grams protein, 820 mL free water, 100 (g of CHO).  Fluids per MD. Recommend an additional 400 mL of free water (based on 1 mL/kcal).  Monitor weight    RD following.

## 2022-11-16 NOTE — DISCHARGE PLANNING
"1030  Spoke with Gilberto this morn. In regard to if he knew whether Collette had gotten Annuity inform. From the "[employer] to finish up w/PFA to apply for Medicaid. He wasn't sure but she was working on something before he left from the house.  Told him we are trying to get a lease bed and need she is not doing well.  She is not able to answer questions correctly and right now she is on oxygen.  Pt had PEG placed yesterday and may be because of the anesthesia. Told he we need a plan for what they could do.  He understood.  He will talk with sister.     1435  Spoke with Gilberto, brother, in regard to taking her sister home.  The hospital will pay for [facility] to come to the home and have PT/OT/SP.  Also, if needed wheelchair and supplies for PEG feeding also.  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  We tried to call both home phone and cell w/o success.  Left mess and Gilberto is trying to call Collette and have her call me.      1524 Collette, sister, called back and has gone to "[employer] to get copy of the annuity check but she will have to go online to request it.  She tried and wasn't able to find a way to request it.  Asked how much she gets she stated, \"$2462.00 yearly. This is the only income she has.  They told me can send it per slow mail.  Since I'm not familiar with [facility] I will set up an account.\"  I told her that the other reason I was calling was that we need for her and brother to take pt. Home and [facility] will pay for Enh[facility] for therapy and the supplies for PEG placement. Also, if she needs other poss. Equip.  She stated, \"I'm doing every thing I can to get it done and I don't think it is right for [facility] to dump her on us.\"  Told her unfortunately she doesn't have insurance and have been trying to get ya'll to file for Medicaid and poss. Medicare for her. She has been here for 14 days. She stated, \"I think it would have been better if she had !\"  I " told her I'm really sorry but I will talk w/my supervisor's to see when the d/c date is and what is needed.

## 2022-11-16 NOTE — CARE PLAN
The patient is Stable - Low risk of patient condition declining or worsening    Shift Goals  Clinical Goals: Q4 hour neuro checks, Blood glucose monitoring  Patient Goals: DALE  Family Goals: comfort, restart feeding    Progress made toward(s) clinical / shift goals:      Problem: Neuro Status  Goal: Neuro status will remain stable or improve  Outcome: Progressing     Problem: Hemodynamic Monitoring  Goal: Patient's hemodynamics, fluid balance and neurologic status will be stable or improve  Outcome: Progressing     Problem: Respiratory - Stroke Patient  Goal: Patient will achieve/maintain optimum respiratory rate/effort  Outcome: Progressing     Problem: Skin Integrity  Goal: Skin integrity is maintained or improved  Outcome: Progressing     Problem: Fall Risk  Goal: Patient will remain free from falls  Outcome: Progressing     Problem: Pain - Standard  Goal: Alleviation of pain or a reduction in pain to the patient’s comfort goal  Outcome: Progressing       Patient is not progressing towards the following goals:

## 2022-11-16 NOTE — WOUND TEAM
[facility]  Inpatient Services  Initial Wound and Skin Care Evaluation    Admission Date: 11/2/2022     Last order of IP CONSULT TO WOUND CARE was found on 11/15/2022 from Hospital Encounter on 11/2/2022     HPI, PMH, SH: Reviewed    Past Surgical History:   Procedure Laterality Date    AK UPPER GI ENDOSCOPY,DIAGNOSIS N/A 11/15/2022    Procedure: GASTROSCOPY WITH PEG TUBE PLACEMENT;  Surgeon: James Urena M.D.;  Location: SURGERY SAME DAY [facility];  Service: Gastroenterology     Social History     Tobacco Use    Smoking status: Not on file    Smokeless tobacco: Not on file   Substance Use Topics    Alcohol use: Not on file     Chief Complaint   Patient presents with    Altered Mental Status     Pt found by family this AM with difficulty speaking, pt went to the bathroom and was later found by family in the bathroom with altered mental status not following commands, no signs of trauma, no signs of substance or ETOH on scene per EMS. Hx DM, blood sugar 215     Diagnosis: Acute CVA (cerebrovascular accident) (HCC) [I63.9]  Failure to thrive in adult [R62.7]    Unit where seen by Wound Team: S183/02     WOUND CONSULT/FOLLOW UP RELATED TO:  nasal septum consult     WOUND HISTORY:  Patient had cortrak with bridle in place.     WOUND ASSESSMENT/LDA  Wound 11/15/22 Nose Left;Right Indeterminable (Active)   Wound Image    11/16/22 1500   Site Assessment Yellow;Red 11/16/22 1500   Periwound Assessment Red 11/16/22 1500   Margins Defined edges 11/16/22 1500   Closure Open to air 11/16/22 1500   Drainage Amount None 11/16/22 1500   Treatments Cleansed;Site care 11/16/22 1500   Wound Cleansing Normal Saline Irrigation 11/16/22 1500   Periwound Protectant Skin Protectant Wipes to Periwound 11/16/22 1500   Dressing Cleansing/Solutions Not Applicable 11/16/22 1500   Dressing Options Open to Air 11/16/22 1500   Dressing Changed New 11/16/22 1500   Dressing Status Clean;Dry;Intact 11/16/22 1500   NEXT Weekly Photo  (Inpatient Only) 11/23/22 11/16/22 1500   WOUND NURSE ONLY - Pressure Injury Stage Indeterminab 11/16/22 1500   Non-staged Wound Description Not applicable 11/16/22 1500   Wound Length (cm) 0.5 cm 11/16/22 1500   Wound Width (cm) 1 cm 11/16/22 1500   Wound Surface Area (cm^2) 0.5 cm^2 11/16/22 1500   Shape linear 11/16/22 1500   Wound Odor None 11/16/22 1500   Exposed Structures DALE 11/16/22 1500   WOUND NURSE ONLY - Time Spent with Patient (mins) 30 11/16/22 1500   Number of days: 1        Vascular:    TK:   No results found.    Lab Values:    Lab Results   Component Value Date/Time    WBC 12.0 (H) 11/13/2022 07:01 PM    RBC 4.33 11/13/2022 07:01 PM    HEMOGLOBIN 12.6 11/13/2022 07:01 PM    HEMATOCRIT 39.4 11/13/2022 07:01 PM    CREACTPROT 3.53 (H) 11/14/2022 01:47 AM    HBA1C 11.4 (H) 11/03/2022 01:02 AM        Culture Results show:  No results found for this or any previous visit (from the past 720 hour(s)).    Pain Level/Medicated:  painful       INTERVENTIONS BY WOUND TEAM:  Chart and images reviewed. Discussed with bedside RN. All areas of concern (based on picture review, LDA review and discussion with bedside RN) have been thoroughly assessed. Documentation of areas based on significant findings. This RN in to assess patient. Performed standard wound care which includes appropriate positioning, dressing removal and non-selective debridement. Pictures and measurements obtained weekly if/when required.  Preparation for Dressing removal: ELISEO  Non-selectively Debrided with:  NS and gauze.  Sharp debridement: NA  Katarzyna wound: Cleansed with NS and gauze, Prepped with no sting skin prep  Primary Dressing: ELISEO  Secondary (Outer) Dressing: ELISEO    Interdisciplinary consultation: Patient, Bedside RN (Jennifer)    EVALUATION / RATIONALE FOR TREATMENT:  Most Recent Date:  11/16/22: Patient had cortrak placed with bridle for tube feeding.  Patient went to surgery yesterday for PEG tube placement.  Patient cortrak and bridle  was removed to reveal non-blanching, non-determinable area of skin breakdown on the nasal septum.  Patient is still in need of oxygen and was placed on a mask to make sure that the nasal septum is properly offloaded.      Goals: Steady decrease in wound area and depth weekly.    WOUND TEAM PLAN OF CARE ([X] for frequency of wound follow up,):   Nursing to follow dressing orders written for wound care. Contact wound team if area fails to progress, deteriorates or with any questions/concerns if something comes up before next scheduled follow up (See below as to whether wound is following and frequency of wound follow up)  Dressing changes by wound team:                   Follow up 3 times weekly:                NPWT change 3 times weekly:     Follow up 1-2 times weekly:    x, weekly follow-up   Follow up Bi-Monthly:           Follow up Monthly (High Risk):                        Follow up as needed:     Other (explain):     NURSING PLAN OF CARE ORDERS (X):  Dressing changes: See Dressing Care orders: x  Skin care: See Skin Care orders:   RN Prevention Protocol: x  Rectal tube care: See Rectal Tube Care orders:   Other orders:    RSKIN:   CURRENTLY IN PLACE (X), APPLIED THIS VISIT (A), ORDERED (O):   Q shift Neville:  X  Q shift pressure point assessments:  X    Surface/Positioning   Pressure redistribution mattress     x       Low Airloss          ICU Low Airloss   Bariatric AIXA     Waffle cushion        Waffle Overlay    x      Reposition q 2 hours    x  TAPs Turning system     Z Triston Pillow     Offloading/Redistribution   Sacral Mepilex (Silicone dressing)     Heel Mepilex (Silicone dressing)    x     Heel float boots (Prevalon boot)             Float Heels off Bed with Pillows   x        Respiratory Oxy mask with straps situated above and away from ears.  Silicone O2 tubing         Gray Foam Ear protectors     Cannula fixation Device (Tender )          High flow offloading Clip    Elastic head band offloading  device      Anchorfast                                                         Trach with Optifoam split foam             Containment/Moisture Prevention   NA  Rectal tube or BMS    Purwick/Condom Cath        Aj Catheter    Barrier wipes           Barrier paste       Antifungal tx      Interdry        Mobilization NA      Up to chair        Ambulate      PT/OT      Nutrition       Dietician        Diabetes Education      PO     TF x    TPN     NPO   # days     Other        Anticipated discharge plans: TBD, medical clearance  LTACH:        SNF/Rehab:                  Home Health Care:           Outpatient Wound Center:            Self/Family Care:        Other:                  Vac Discharge Needs: NA  Not Applicable Pt not on a wound vac:     x  Regular Vac while inpatient, alternative dressing at DC:        Regular Vac in use and continued at DC:            Reg. Vac w/ Skin Sub/Biologic in use. Will need to be changed 2x wkly:      Veraflo Vac while inpatient, ok to transition to Regular Vac on Discharge:           Veraflo Vac while inpatient, will need to remain on Veraflo Vac upon discharge:

## 2022-11-16 NOTE — PROGRESS NOTES
Hospital Medicine Daily Progress Note    Date of Service  11/16/2022    Chief Complaint  Jacqueline Webb is a 56 y.o. female admitted 11/2/2022 with altered mental status    Hospital Course  Jacqueline Webb is a 56 y.o. female who presented 11/2/2022 with a history of type 2 diabetes, high cholesterol, vision loss who presented with altered mental status.  ED patient was found to have a left-sided gaze preference, NIH 25.  Subsequent MRI noted with left frontal insular temporal infarct.  Evaluated by neurology recommended continue aspirin, Lipitor and follow-up with outpatient with [facility].  PT/OT recommending postacute placement.         Interval Problem Update  11/15: PEG placed this morning.  Patient tolerated procedure well.  No overnight events.  Resume anticoagulation tomorrow.  GI will clear patient when PEG can be used.  Glucose was 141 this morning.  11/16: PEG placed yesterday.  Tube feeds started.  No overnight events.  Case management working with family so that the patient can discharge home.  Home health order placed.  Anticipate discharge tomorrow.    I have discussed this patient's plan of care and discharge plan at IDT rounds today with Case Management, Nursing, Nursing leadership, and other members of the IDT team.    Consultants/Specialty  neurology    Code Status  DNAR/DNI    Disposition  Patient is medically cleared for discharge.   Anticipate discharge to to skilled nursing facility.  I have placed the appropriate orders for post-discharge needs.    Review of Systems  Review of Systems   Unable to perform ROS: Medical condition      Physical Exam  Temp:  [36.5 °C (97.7 °F)-36.7 °C (98.1 °F)] 36.7 °C (98.1 °F)  Pulse:  [76-84] 84  Resp:  [17-18] 18  BP: (123-129)/(66-70) 123/66  SpO2:  [94 %-100 %] 94 %    Physical Exam  Vitals and nursing note reviewed.   Constitutional:       General: She is not in acute distress.  HENT:      Head: Normocephalic and atraumatic.      Nose: Nose normal.  No rhinorrhea.      Mouth/Throat:      Pharynx: No oropharyngeal exudate or posterior oropharyngeal erythema.   Eyes:      General: No scleral icterus.        Right eye: No discharge.         Left eye: No discharge.   Cardiovascular:      Rate and Rhythm: Normal rate and regular rhythm.      Heart sounds: Normal heart sounds. No murmur heard.    No friction rub. No gallop.   Pulmonary:      Effort: Pulmonary effort is normal. No respiratory distress.      Breath sounds: Normal breath sounds. No stridor. No wheezing, rhonchi or rales.   Chest:      Chest wall: No tenderness.   Abdominal:      General: Bowel sounds are normal. There is no distension.      Palpations: Abdomen is soft. There is no mass.      Tenderness: There is no abdominal tenderness. There is no rebound.      Comments: PEG   Musculoskeletal:         General: No swelling or tenderness.      Cervical back: Neck supple. No rigidity.   Skin:     General: Skin is warm and dry.      Coloration: Skin is not cyanotic or jaundiced.      Nails: There is no clubbing.   Neurological:      Mental Status: She is alert.      Cranial Nerves: Cranial nerve deficit present.      Motor: Weakness present.      Comments: Expressive aphasia  Right hemiparesis 1/5     Psychiatric:         Mood and Affect: Mood normal.         Behavior: Behavior normal.       Fluids    Intake/Output Summary (Last 24 hours) at 11/16/2022 1408  Last data filed at 11/16/2022 0401  Gross per 24 hour   Intake 400 ml   Output 0 ml   Net 400 ml         Laboratory  Recent Labs     11/13/22  1901   WBC 12.0*   RBC 4.33   HEMOGLOBIN 12.6   HEMATOCRIT 39.4   MCV 91.0   MCH 29.1   MCHC 32.0*   RDW 39.5   PLATELETCT 393   MPV 9.4       Recent Labs     11/14/22  0147   SODIUM 145   POTASSIUM 4.0   CHLORIDE 100   CO2 35*   GLUCOSE 142*   BUN 40*   CREATININE 0.53   CALCIUM 10.2                         Imaging  CT-CHEST (THORAX) WITH   Final Result      1.  No evidence of pulmonary nodule. Questioned  pulmonary nodule on abdominal film due to callus formation from subacute/chronic right anterior fourth rib fracture.      2.  Porcelain gallbladder.      Fleischner Society pulmonary nodule recommendations:   Not Applicable         DX-ABDOMEN FOR TUBE PLACEMENT   Final Result      Enteric tube with catheter tip in the left upper quadrant consistent with intragastric location.      MR-BRAIN-W/O   Final Result         Acute infarct in the left frontal, insular and left medial temporal region. Small acute infarct also noted in the left internal capsule anterior limb and adjacent basal ganglia.      Occlusion of the left distal cervical and petrous ICA.      Remote right parietal infarct.      Age-related volume loss and chronic microvascular ischemic changes.      DX-ABDOMEN FOR TUBE PLACEMENT   Final Result         1.  Nonspecific bowel gas pattern in the upper abdomen.   2.  Nasogastric tube tip terminates overlying the expected location of the pylorus or first duodenal segment.   3.  Atherosclerosis      DX-ABDOMEN FOR TUBE PLACEMENT   Final Result         1.  Nonspecific bowel gas pattern in the upper abdomen.   2.  Nasogastric tube with side-port at the gastroesophageal junction, recommend advancement.   3.  Right midlung nodular density, recommend follow-up CT chest for further characterization.   4.  Atherosclerosis      These findings were discussed with the patient's clinician, Dr. Puente, on 11/4/2022 7:25 AM.      EC-ECHOCARDIOGRAM COMPLETE W/O CONT   Final Result      DX-CHEST-PORTABLE (1 VIEW)   Final Result      No acute cardiac or pulmonary abnormalities are identified.      CT-CTA NECK WITH & W/O-POST PROCESSING   Final Result      1.  Occluded LEFT extracranial internal carotid artery. This could be due to thromboembolic disease or dissection.   2.  Estimated 50-75% stenosis of the proximal RIGHT internal carotid artery   3.  Thyroid nodules      CT-CEREBRAL PERFUSION ANALYSIS   Final Result      1.   Moderate sized completed area of infarction noted in the left frontal parasylvian region.      CT-CTA HEAD WITH & W/O-POST PROCESS   Final Result      1.  Occlusion of the imaged extracranial LEFT internal carotid artery through the cavernous segment with reconstitution of the supraclinoid segment   2.  LEFT M3 branch occlusion            Findings were communicated with and acknowledged by Dr. Sultana via [employer] on 11/2/2022 2:34 PM.      CT-HEAD W/O   Final Result      1.  Moderate sized acute area of infarction involving the left frontal parasylvian region.      2.  Smaller encephalomalacia thinning to the cortex in the right parietal-occipital region.      3.  Mild age-related cerebral atrophy.                Assessment/Plan  * Acute CVA (cerebrovascular accident) (HCC)- (present on admission)  Assessment & Plan  MRI noted with left frontal insular temporal infarct    Continue aspirin atorvastatin    Evaluated by neurology with recommendation for Zio patch on discharge    PT/OT recommending postacute placement no accepting SNF discussed with case management who will follow up with family about applying for Medicaid  S/p peg     Thyroid nodule  Assessment & Plan  Noted on CTA of neck will need outpatient follow-up  TSH normal    Carotid stenosis  Assessment & Plan  Aspirin statin  Outpatient monitoring and follow-up    Vision loss  Assessment & Plan  Will need outpatient ophthalmology evaluation    HLD (hyperlipidemia)  Assessment & Plan  Continue atorvastatin    Type 2 diabetes mellitus (HCC)  Assessment & Plan     Hemoglobin A1c 11.4    Patient will be n.p.o. for PEG tube placement tomorrow we will hold scheduled dose of Lantus 38 units and will give 15 units tonight to avoid hypoglycemia  Continue sliding-scale insulin monitor CBGs         VTE prophylaxis: SCDs/TEDs and enoxaparin ppx    I have performed a physical exam and reviewed and updated ROS and Plan today (11/16/2022). In review of yesterday's  note (11/15/2022), there are no changes except as documented above.

## 2022-11-16 NOTE — FACE TO FACE
Face to Face Supporting Documentation - Home Health    The encounter with this patient was in whole or in part the primary reason for home health admission.    Date of encounter:   Patient:                    MRN:                       YOB: 2024  Jacqueline Webb  [id number]  1965     Home health to see patient for:  Skilled Nursing care for assessment, interventions & education, Physical Therapy evaluation and treatment, and Occupational therapy evaluation and treatment    Skilled need for:  New Onset Medical Diagnosis acute CVA    Skilled nursing interventions to include:  Comment: assessment, intervention, education    Homebound status evidenced by:  Need the aid of supportive devices such as crutches, canes, wheelchairs or walkers or Needs the assistance of another person in order to leave the home. Leaving home requires a considerable and taxing effort. There is a normal inability to leave the home.    Community Physician to provide follow up care: No primary care provider on file.     Optional Interventions? No      I certify the face to face encounter for this home health care referral meets the CMS requirements and the encounter/clinical assessment with the patient was, in whole, or in part, for the medical condition(s) listed above, which is the primary reason for home health care. Based on my clinical findings: the service(s) are medically necessary, support the need for home health care, and the homebound criteria are met.  I certify that this patient has had a face to face encounter by myself.  Sánchez Olivia D.O. - NPI: [id number]

## 2022-11-16 NOTE — CARE PLAN
The patient is Stable - Low risk of patient condition declining or worsening    Shift Goals  Clinical Goals: monitor blood sugar; Q4 neuro checks  Patient Goals: DALE  Family Goals: DALE    Progress made toward(s) clinical / shift goals:  Pt. NPO until 12 hours post PEG placement. Currently NPO. Pt. Needed dextrose infusion for blood sugar<70. Will continue with Q4 neuro checks    Patient is not progressing towards the following goals:n/a

## 2022-11-16 NOTE — THERAPY
"Speech Language Pathology  Daily Treatment     Patient Name: Jacqueline Webb  Age:  56 y.o., Sex:  female  Medical Record #: [id number]  Today's Date: 11/16/2022     Precautions  Precautions: Fall Risk, Nasogastric Tube  Comments: Fall Risk; Nasogastric Tube    Assessment  Pt seen with Gilberto, pt's brother, present. Gilberto purchased write on board to use for aphasia tx as pt's eye opening and wakefulness improves. Pt with oxymask on s/p PEG placement 11/15. Pt with intermittent eye opening with right eye opening greater than left eye. Pt with two yes head nods during session. Pt imitating \"hi\" with max cues and model with low intensity whisper type voicing. Pt also squeezing SLP's hand, with her left hand, when asked \"squeeze my hand if you are listening to me Jacqueline\" and when SLP providing verbal educ regarding lower level aphasia tx. Pt's brother is very supportive but verbalizing concerns regarding caring for pt if she is dcd home at current status.       Plan  Target lower level responses-maintained eye opening, Y/N head nods, and imitating at the phoneme level.  Continue current treatment plan.    Discharge Recommendations: Recommend post-acute placement for additional speech therapy services prior to discharge home       11/16/22 1515   Verbal Expression   Verbal Expression / Aphasia Eval (WDL) X   Vocal Quality Decreased Intensity   Skilled Intervention Verbal Cueing   Comments low intensity to whisper x2 imitating \"hi\" with max cues. Two yes head nods.   Auditory Comprehension   Auditory Comprehension (WDL) X   Follows Two Unit Commands Profound (1)   Comments Pt following directive to squeeze SLP's hand using her left hand when asked if she was listening to the SLP provide family/pt educ regarding severe aphasia and possible POC   Patient / Family Goals   Patient / Family Goal #1 Shook head Y for more ice chips   Goal #1 Outcome Goal not met   Short Term Goals   Short Term Goal # 2 Pt will demonstrate a reliable Y " and N to express wants and needs x10 in a session.   Goal Outcome # 2  Progressing slower than expected   Short Term Goal # 3 11/11 Pt will maintain wakefulness for greater than 15 min with mod max stimulation.   Goal Outcome  # 3 Progressing slower than expected   Short Term Goal # 4 Pt will follow 1-step commands given mod multisensory cues from SLP with 80% accuracy.   Goal Outcome  # 4 Progressing slower than expected   Short Term Goal # 5 Pt will attempt vocalization x3 in a session given mod cues from SLP.   Goal Outcome  # 5 Progressing slower than expected

## 2022-11-17 NOTE — PROGRESS NOTES
APRN notified regarding bladder scan result. Orders received to straight cath. Straight cath performed. Output 525mL. Will continue to monitor I&O's.

## 2022-11-17 NOTE — PROGRESS NOTES
Hospital Medicine Daily Progress Note    Date of Service  11/17/2022    Chief Complaint  Jacqueline Webb is a 56 y.o. female admitted 11/2/2022 with altered mental status    Hospital Course  Jacqueline Webb is a 56 y.o. female who presented 11/2/2022 with a history of type 2 diabetes, high cholesterol, vision loss who presented with altered mental status.  ED patient was found to have a left-sided gaze preference, NIH 25.  Subsequent MRI noted with left frontal insular temporal infarct.  Evaluated by neurology recommended continue aspirin, Lipitor and follow-up with outpatient with [facility].  PT/OT recommending postacute placement.         Interval Problem Update  11/15: PEG placed this morning.  Patient tolerated procedure well.  No overnight events.  Resume anticoagulation tomorrow.  GI will clear patient when PEG can be used.  Glucose was 141 this morning.  11/16: PEG placed yesterday.  Tube feeds started.  No overnight events.  Case management working with family so that the patient can discharge home.  Home health order placed.  Anticipate discharge tomorrow.  11/17: No overnight events.  Case management reach out to the patient's sister she is considering taking the patient back.  Still making arrangements so the patient can discharge home with home health.  We will reach out to palliative care for possible readdressing goals of care with the family.  May be a benefit to discuss comfort care.    I have discussed this patient's plan of care and discharge plan at IDT rounds today with Case Management, Nursing, Nursing leadership, and other members of the IDT team.    Consultants/Specialty  neurology    Code Status  DNAR/DNI    Disposition  Patient is medically cleared for discharge.   Anticipate discharge to to skilled nursing facility.  I have placed the appropriate orders for post-discharge needs.    Review of Systems  Review of Systems   Unable to perform ROS: Medical condition      Physical  Exam  Temp:  [36.5 °C (97.7 °F)-36.8 °C (98.2 °F)] 36.7 °C (98.1 °F)  Pulse:  [75-84] 84  Resp:  [17-18] 17  BP: (108-132)/(60-74) 108/60  SpO2:  [98 %-99 %] 99 %    Physical Exam  Vitals and nursing note reviewed.   Constitutional:       General: She is not in acute distress.  HENT:      Head: Normocephalic and atraumatic.      Nose: Nose normal. No rhinorrhea.      Mouth/Throat:      Pharynx: No oropharyngeal exudate or posterior oropharyngeal erythema.   Eyes:      General: No scleral icterus.        Right eye: No discharge.         Left eye: No discharge.   Cardiovascular:      Rate and Rhythm: Normal rate and regular rhythm.      Heart sounds: Normal heart sounds. No murmur heard.    No friction rub. No gallop.   Pulmonary:      Effort: Pulmonary effort is normal. No respiratory distress.      Breath sounds: Normal breath sounds. No stridor. No wheezing, rhonchi or rales.   Chest:      Chest wall: No tenderness.   Abdominal:      General: Bowel sounds are normal. There is no distension.      Palpations: Abdomen is soft. There is no mass.      Tenderness: There is no abdominal tenderness. There is no rebound.      Comments: PEG   Musculoskeletal:         General: No swelling or tenderness.      Cervical back: Neck supple. No rigidity.   Skin:     General: Skin is warm and dry.      Coloration: Skin is not cyanotic or jaundiced.      Nails: There is no clubbing.   Neurological:      Mental Status: She is alert.      Cranial Nerves: Cranial nerve deficit present.      Motor: Weakness present.      Comments: Expressive aphasia  Right hemiparesis 1/5     Psychiatric:         Mood and Affect: Mood normal.         Behavior: Behavior normal.       Fluids    Intake/Output Summary (Last 24 hours) at 11/17/2022 1435  Last data filed at 11/17/2022 1100  Gross per 24 hour   Intake 710 ml   Output 525 ml   Net 185 ml         Laboratory                                Imaging  CT-CHEST (THORAX) WITH   Final Result      1.  No  evidence of pulmonary nodule. Questioned pulmonary nodule on abdominal film due to callus formation from subacute/chronic right anterior fourth rib fracture.      2.  Porcelain gallbladder.      Fleischner Society pulmonary nodule recommendations:   Not Applicable         DX-ABDOMEN FOR TUBE PLACEMENT   Final Result      Enteric tube with catheter tip in the left upper quadrant consistent with intragastric location.      MR-BRAIN-W/O   Final Result         Acute infarct in the left frontal, insular and left medial temporal region. Small acute infarct also noted in the left internal capsule anterior limb and adjacent basal ganglia.      Occlusion of the left distal cervical and petrous ICA.      Remote right parietal infarct.      Age-related volume loss and chronic microvascular ischemic changes.      DX-ABDOMEN FOR TUBE PLACEMENT   Final Result         1.  Nonspecific bowel gas pattern in the upper abdomen.   2.  Nasogastric tube tip terminates overlying the expected location of the pylorus or first duodenal segment.   3.  Atherosclerosis      DX-ABDOMEN FOR TUBE PLACEMENT   Final Result         1.  Nonspecific bowel gas pattern in the upper abdomen.   2.  Nasogastric tube with side-port at the gastroesophageal junction, recommend advancement.   3.  Right midlung nodular density, recommend follow-up CT chest for further characterization.   4.  Atherosclerosis      These findings were discussed with the patient's clinician, Dr. Puente, on 11/4/2022 7:25 AM.      EC-ECHOCARDIOGRAM COMPLETE W/O CONT   Final Result      DX-CHEST-PORTABLE (1 VIEW)   Final Result      No acute cardiac or pulmonary abnormalities are identified.      CT-CTA NECK WITH & W/O-POST PROCESSING   Final Result      1.  Occluded LEFT extracranial internal carotid artery. This could be due to thromboembolic disease or dissection.   2.  Estimated 50-75% stenosis of the proximal RIGHT internal carotid artery   3.  Thyroid nodules      CT-CEREBRAL  PERFUSION ANALYSIS   Final Result      1.  Moderate sized completed area of infarction noted in the left frontal parasylvian region.      CT-CTA HEAD WITH & W/O-POST PROCESS   Final Result      1.  Occlusion of the imaged extracranial LEFT internal carotid artery through the cavernous segment with reconstitution of the supraclinoid segment   2.  LEFT M3 branch occlusion            Findings were communicated with and acknowledged by Dr. Sultana via [employer] on 11/2/2022 2:34 PM.      CT-HEAD W/O   Final Result      1.  Moderate sized acute area of infarction involving the left frontal parasylvian region.      2.  Smaller encephalomalacia thinning to the cortex in the right parietal-occipital region.      3.  Mild age-related cerebral atrophy.                Assessment/Plan  * Acute CVA (cerebrovascular accident) (HCC)- (present on admission)  Assessment & Plan  MRI noted with left frontal insular temporal infarct    Continue aspirin atorvastatin    Evaluated by neurology with recommendation for Zio patch on discharge    PT/OT recommending postacute placement no accepting SNF discussed with case management who will follow up with family about applying for Medicaid  S/p peg     Thyroid nodule  Assessment & Plan  Noted on CTA of neck will need outpatient follow-up  TSH normal    Carotid stenosis  Assessment & Plan  Aspirin statin  Outpatient monitoring and follow-up    Vision loss  Assessment & Plan  Will need outpatient ophthalmology evaluation    HLD (hyperlipidemia)  Assessment & Plan  Continue atorvastatin    Type 2 diabetes mellitus (HCC)  Assessment & Plan     Hemoglobin A1c 11.4    Patient will be n.p.o. for PEG tube placement tomorrow we will hold scheduled dose of Lantus 38 units and will give 15 units tonight to avoid hypoglycemia  Continue sliding-scale insulin monitor CBGs         VTE prophylaxis: SCDs/TEDs and enoxaparin ppx    I have performed a physical exam and reviewed and updated ROS and Plan today  (11/17/2022). In review of yesterday's note (11/16/2022), there are no changes except as documented above.

## 2022-11-17 NOTE — THERAPY
Occupational Therapy  Daily Treatment     Patient Name: Jacqueline Webb  Age:  56 y.o., Sex:  female  Medical Record #: [id number]  Today's Date: 11/17/2022       Precautions: Fall Risk, Swallow Precautions ( See Comments), PEG Tube  Comments: aphasic, R side deficits    Assessment    Pt seen for OT tx. Continues to be limited by decreased activity tolerance, balance deficits and RUE weakness impacting ability to complete ADLs and ADL transfers independently. Continues to be aphasic but able to follow 1-step. RUE continues to have no active or purposeful movement. Will continue to benefit from OT services while in house.     Plan    Continue current treatment plan.    DC Equipment Recommendations: Unable to determine at this time  Discharge Recommendations: Recommend post-acute placement for additional occupational therapy services prior to discharge home       11/17/22 0915   Cognition    Cognition / Consciousness X   Safety Awareness Impaired   New Learning Impaired   Attention Impaired   Active ROM Upper Body   Active ROM Upper Body  X   Comments RUE flaccid, LUE ROM limited by weakness   Strength Upper Body   Upper Body Strength  X   Comments RUE flaccid, LUE grossly weakness   Balance   Sitting Balance (Static) Fair -   Sitting Balance (Dynamic) Poor +   Weight Shift Sitting Poor   Bed Mobility    Supine to Sit Maximal Assist   Sit to Supine Maximal Assist   Activities of Daily Living   Grooming Maximal Assist;Seated   Upper Body Dressing Moderate Assist   Lower Body Dressing Maximal Assist   Toileting Maximal Assist   Short Term Goals   Short Term Goal # 1 seated light grooming using left UE with min a.   Goal Outcome # 1 Progressing as expected   Short Term Goal # 2 Toilleting with min a on BSC or in bathroom.   Goal Outcome # 2 Progressing slower than expected   Anticipated Discharge Equipment and Recommendations   DC Equipment Recommendations Unable to determine at this time   Discharge Recommendations Recommend  post-acute placement for additional occupational therapy services prior to discharge home

## 2022-11-17 NOTE — CARE PLAN
The patient is Stable - Low risk of patient condition declining or worsening    Shift Goals  Clinical Goals: Monitor urinary output;  Patient Goals: DALE;  Family Goals: DALE;    Problem: Urinary Elimination  Goal: Establish and maintain regular urinary output  Outcome: Not Progressing  Note: Patient experiencing urinary retention this evening requiring straight catheterization X1. Will continue to monitor intake and output and bladder scan as appropriate.      Problem: Skin Integrity  Goal: Skin integrity is maintained or improved  Note: Patient at very high risk for skin breakdown due to deficits/weakness related to CVA. Skin assessed q shift. Pressure ulcer prevention protocol initiated. Pressure relieving devices (waffle overlay, heel float boots, mepilex, etc.) in use.

## 2022-11-17 NOTE — PROGRESS NOTES
Patient yet to void during shift. Bladder scan performed. Bladder scan result 547. Will call and update provider.

## 2022-11-17 NOTE — CARE PLAN
The patient is Stable - Low risk of patient condition declining or worsening    Shift Goals  Clinical Goals: monitor neuro status & blood sugar; skin integrity  Patient Goals: DALE  Family Goals: comfort    Progress made toward(s) clinical / shift goals:  Pt. Is non-verbal. Q4 neuro checks & Q2 turns in place.    Patient is not progressing towards the following goals:       Problem: Knowledge Deficit - Stroke Education  Goal: Patient's knowledge of stroke and risk factors will improve  Outcome: Not Progressing     Problem: Psychosocial - Patient Condition  Goal: Patient's ability to verbalize feelings about condition will improve  Outcome: Not Progressing  Goal: Patient's ability to re-evaluate and adapt role responsibilities will improve  Outcome: Not Progressing

## 2022-11-18 NOTE — PROGRESS NOTES
Patient remains on bolus GT feed and water flush gastric residual 30cc,prior to giving patient GT  feed well tolerated.patient turned and repositioned for comfort with pressure area

## 2022-11-18 NOTE — DISCHARGE PLANNING
Medical Social Work  PC from patient's sister Collette calling to say that she has a form to provide to [facility], also requested to know if the attending will complete a form stating her sister is not able to take care of herself. JAJA asked if this is for guardianship, JAJA told yes.  JAJA stated that the attending can complete a Physicians Assessment for Court.     JAJA told Collette that her sister discharge plan was discussed this morning.  That patient is medically clear to discharge, does not have any acute medical needs to remain in the hospital.   JAJA verified that patient was not on tube/bolus fees prior to admit. That [facility] would provide supplies for her as patient does not have insurance, possible a month.

## 2022-11-18 NOTE — PROGRESS NOTES
4 Eyes Skin Assessment Completed by ,Stephanie MALIK and Jayleen MALIK.    Head WDL  Ears WDL  Nose Scab  Mouth WDL  Neck Scar  Breast/Chest Scab  Shoulder Blades WDL  Spine WDL  (R) Arm/Elbow/Hand WDL  (L) Arm/Elbow/Hand WDL  Abdomen Incision  Groin WDL  Scrotum/Coccyx/Buttocks WDL  (R) Leg WDL  (L) Leg WDL  (R) Heel/Foot/Toe WDL  (L) Heel/Foot/Toe WDL          Devices In Places SCD's      Interventions In Place Heel Float Boots    Possible Skin Injury Yes    Pictures Uploaded Into Epic Yes  Wound Consult Placed N/A  RN Wound Prevention Protocol Ordered Yes

## 2022-11-18 NOTE — CARE PLAN
The patient is stable with low risk of patients condition declinning    Shift Goals  Clinical Goals: Neurological stability  Patient Goals: DALE  Family Goals: DALE    Progress made toward(s) clinical / shift goals:  progressing    Patient is not progressing towards the following goals:

## 2022-11-18 NOTE — DISCHARGE PLANNING
Medical Social Work  Patient's sister Collette visiting her sister.  Presented JAJA a Physician's Assessment so she and her brother can obtain guardianship of her sister.  Collette also presented JAJA a letter stating that it is unsafe to send her home. There is no care available in her home.     JAJA asked Collette where she lives, SW told in the same home as the patient.  Is unable to care for her, as she is bigger than her.  JAJA asked what is her solution, Collette stated she is applying for Medicaid and guardianship.  That this will take some time, she is unable to pay for the patient to go to a skilled nursing home.  JAJA stated that the patient is medically clear to discharge, that she does not have an acute need to remain in the hospital.  That the patient will need to go home, once the necessary feeding supplies are arranged.  JAJA stated that this would be no later than Monday or Tuesday.  Collette stated she does not think this fair, JAJA again stated patient is medically clear to discharge. That it is not the Hospitals responsibility to arrange or pay for caretakers for the patient.    Volt the Dr Olivia with an update

## 2022-11-18 NOTE — CARE PLAN
The patient is Stable - Low risk of patient condition declining or worsening    Shift Goals  Clinical Goals: monitor for urinary retention; stable neuro  Patient Goals: DALE  Family Goals: DALE    Progress made toward(s) clinical / shift goals:  Pt. Is non-verbal. Q4 neuro checks & Q2 turns in place.    Patient is not progressing towards the following goals:      Problem: Knowledge Deficit - Stroke Education  Goal: Patient's knowledge of stroke and risk factors will improve  11/17/2022 1758 by Wendy Meng R.N.  Outcome: Not Met  11/17/2022 1757 by SUHA Jensen.N.  Outcome: Progressing     Problem: Psychosocial - Patient Condition  Goal: Patient's ability to verbalize feelings about condition will improve  11/17/2022 1758 by Wendy Meng R.N.  Outcome: Not Met  11/17/2022 1757 by Wendy Meng R.N.  Outcome: Progressing     Problem: Knowledge Deficit - Standard  Goal: Patient and family/care givers will demonstrate understanding of plan of care, disease process/condition, diagnostic tests and medications  11/17/2022 1758 by Wendy Meng RGioN.  Outcome: Not Met  11/17/2022 1757 by Wendy Meng R.N.  Outcome: Progressing

## 2022-11-18 NOTE — PROGRESS NOTES
4 Eyes Skin Assessment Completed by HELENA Rodriguez and HELENA Joiner.    Head WDL  Ears WDL  Nose Redness and Blanching  Mouth Redness & blanching with some flaking around cheek area  Neck WDL  Breast/Chest Abrasion and Scab  Shoulder Blades WDL  Spine WDL  (R) Arm/Elbow/Hand WDL  (L) Arm/Elbow/Hand WDL  Abdomen Incision PEG-CDI  Groin WDL  Scrotum/Coccyx/Buttocks Redness and Blanching  (R) Leg Bruising  (L) Leg Scab  (R) Heel/Foot/Toe WDL  (L) Heel/Foot/Toe WDL          Devices In Places Pulse Ox, SCD's, Ortho Boot/Shoe, and Oxy Mask      Interventions In Place Heel Float Boots, Waffle Overlay, TAP System, Pillows, Q2 Turns, and Barrier Cream mepitel lite over chest skin tear, Band-Aid over scab on left knee, silicone lite pads for cheeks, nose and chin area where Oxy Mask makes contact with skin. Known nasal wound from previous cortrack ELISEO     Possible Skin Injury Yes    Pictures Uploaded Into Epic Yes  Wound Consult Placed Yes  RN Wound Prevention Protocol Ordered Yes

## 2022-11-18 NOTE — PROGRESS NOTES
Received patient from the neuro unit , pt care assumed. VS stable , pt head to toe assessment complete. Pt with expressive aphasia denies pain at this time. Plan of care discussed with patient .Patient unable to verbalize understanding locked and in lowest position, bed alarm . Pt educated on fall risk and verbalized understanding, call light within reach, hourly rounding initiated.

## 2022-11-18 NOTE — THERAPY
Physical Therapy   Daily Treatment     Patient Name: Jacqueline Webb  Age:  56 y.o., Sex:  female  Medical Record #: [id number]  Today's Date: 11/17/2022     Precautions  Precautions: Fall Risk;Swallow Precautions ( See Comments);PEG Tube  Comments: aphasic, R side deficits    Assessment    Pt agreed to therapy, greeted with eyes open, looking at lift hand. Pt req'd MaxA for bed mobility, but is able to walk LE over to EOB in prep for sitting. In sitting, verbal and visual cues given for anterior lean in response to pt's posterior lean, practiced shifting weight several times. Pt able to lift L UE and perform LAQ w/ L UE. No movement w/ R UE, minimal w/ R LE. Pt is limited by decreased balance, low activity tolerance, and R side deficits. Pt will continue to benefit from skilled PT to address deficits.     Plan    Continue current treatment plan.    DC Equipment Recommendations: Unable to determine at this time  Discharge Recommendations: Recommend post-acute placement for additional physical therapy services prior to discharge home       11/17/22 1459   Other Treatments   Other Treatments Provided Sitting EOB, focused on sitting and weight shifting. Pt able to lift L UE and perform LAQ w/ L UE. No movement w/ R UE, minimal w/ R LE. Pt fatigues quickly   Balance   Sitting Balance (Static) Fair -   Sitting Balance (Dynamic) Poor +   Weight Shift Sitting Poor   Skilled Intervention Verbal Cuing;Postural Facilitation   Comments posterior lean, minimally participating in anterior weight shift to maintain balance   Gait Analysis   Gait Level Of Assist Unable to Participate   Bed Mobility    Supine to Sit Maximal Assist   Sit to Supine Maximal Assist   Scooting Maximal Assist   Skilled Intervention Sequencing;Verbal Cuing;Tactile Cuing   Comments pt able to bring LE over to EOB in prep for sitting   Functional Mobility   Sit to Stand   (NT)   Activity Tolerance   Sitting Edge of Bed 10 min   Short Term Goals    Short Term Goal  # 1 Pt will perform supine <> sit with min A within 6 visits to progress bed mobility   Goal Outcome # 1 goal not met   Short Term Goal # 2 Pt will perform STS with min A and LRAD within 6 visits to progress OOB mobility   Goal Outcome # 2 Goal not met   Short Term Goal # 3 Pt will perform functional transfers with LRAD and min A within 6 visits within 6 visits to progress OOB mobility   Goal Outcome # 3 Goal not met   Short Term Goal # 4 Pt will ambulate 25 ft with LRAD and mod A within 6 visits to initiate gait training   Goal Outcome # 4 Goal not met

## 2022-11-18 NOTE — PROGRESS NOTES
Hospital Medicine Daily Progress Note    Date of Service  11/18/2022    Chief Complaint  Jacqueline Webb is a 56 y.o. female admitted 11/2/2022 with altered mental status    Hospital Course  Jacqueline Webb is a 56 y.o. female who presented 11/2/2022 with a history of type 2 diabetes, high cholesterol, vision loss who presented with altered mental status.  ED patient was found to have a left-sided gaze preference, NIH 25.  Subsequent MRI noted with left frontal insular temporal infarct.  Evaluated by neurology recommended continue aspirin, Lipitor and follow-up with [facility].  PT/OT recommending postacute placement.         Interval Problem Update  11/15: PEG placed this morning.  Patient tolerated procedure well.  No overnight events.  Resume anticoagulation tomorrow.  GI will clear patient when PEG can be used.  Glucose was 141 this morning.  11/16: PEG placed yesterday.  Tube feeds started.  No overnight events.  Case management working with family so that the patient can discharge home.  Home health order placed.  Anticipate discharge tomorrow.  11/17: No overnight events.  Case management reach out to the patient's sister she is considering taking the patient back.  Still making arrangements so the patient can discharge home with home health.  We will reach out to palliative care for possible readdressing goals of care with the family.  May be a benefit to discuss comfort care.  11/18: Patient transferred to the medical floor.  No overnight events.  Working with the family to discharge home.  White patient will likely discharge on Monday or Tuesday.  Family understands that the patient is medically cleared.    I have discussed this patient's plan of care and discharge plan at IDT rounds today with Case Management, Nursing, Nursing leadership, and other members of the IDT team.    Consultants/Specialty  neurology    Code Status  DNAR/DNI    Disposition  Patient is medically cleared for  discharge.   Anticipate discharge to to skilled nursing facility.  I have placed the appropriate orders for post-discharge needs.    Review of Systems  Review of Systems   Unable to perform ROS: Medical condition      Physical Exam  Temp:  [36.5 °C (97.7 °F)-38.1 °C (100.6 °F)] 38.1 °C (100.6 °F)  Pulse:  [81-95] 82  Resp:  [18] 18  BP: ()/(51-64) 149/64  SpO2:  [95 %-99 %] 99 %    Physical Exam  Vitals and nursing note reviewed.   Constitutional:       General: She is not in acute distress.  HENT:      Head: Normocephalic and atraumatic.      Nose: Nose normal. No rhinorrhea.      Mouth/Throat:      Pharynx: No oropharyngeal exudate or posterior oropharyngeal erythema.   Eyes:      General: No scleral icterus.        Right eye: No discharge.         Left eye: No discharge.   Cardiovascular:      Rate and Rhythm: Normal rate and regular rhythm.      Heart sounds: Normal heart sounds. No murmur heard.    No friction rub. No gallop.   Pulmonary:      Effort: Pulmonary effort is normal. No respiratory distress.      Breath sounds: Normal breath sounds. No stridor. No wheezing, rhonchi or rales.   Chest:      Chest wall: No tenderness.   Abdominal:      General: Bowel sounds are normal. There is no distension.      Palpations: Abdomen is soft. There is no mass.      Tenderness: There is no abdominal tenderness. There is no rebound.      Comments: PEG   Musculoskeletal:         General: No swelling or tenderness.      Cervical back: Neck supple. No rigidity.   Skin:     General: Skin is warm and dry.      Coloration: Skin is not cyanotic or jaundiced.      Nails: There is no clubbing.   Neurological:      Mental Status: She is alert.      Cranial Nerves: Cranial nerve deficit present.      Motor: Weakness present.      Comments: Expressive aphasia  Right hemiparesis 1/5     Psychiatric:         Mood and Affect: Mood normal.         Behavior: Behavior normal.       Fluids    Intake/Output Summary (Last 24 hours) at  11/18/2022 1534  Last data filed at 11/18/2022 1300  Gross per 24 hour   Intake 1150 ml   Output --   Net 1150 ml         Laboratory  Recent Labs     11/18/22  1002   WBC 12.0*   RBC 4.22   HEMOGLOBIN 12.2   HEMATOCRIT 38.4   MCV 91.0   MCH 28.9   MCHC 31.8*   RDW 38.0   PLATELETCT 461*   MPV 8.9*       Recent Labs     11/18/22  1002   SODIUM 142   POTASSIUM 3.9   CHLORIDE 99   CO2 35*   GLUCOSE 204*   BUN 33*   CREATININE 0.49*   CALCIUM 9.1                         Imaging  CT-CHEST (THORAX) WITH   Final Result      1.  No evidence of pulmonary nodule. Questioned pulmonary nodule on abdominal film due to callus formation from subacute/chronic right anterior fourth rib fracture.      2.  Porcelain gallbladder.      Fleischner Society pulmonary nodule recommendations:   Not Applicable         DX-ABDOMEN FOR TUBE PLACEMENT   Final Result      Enteric tube with catheter tip in the left upper quadrant consistent with intragastric location.      MR-BRAIN-W/O   Final Result         Acute infarct in the left frontal, insular and left medial temporal region. Small acute infarct also noted in the left internal capsule anterior limb and adjacent basal ganglia.      Occlusion of the left distal cervical and petrous ICA.      Remote right parietal infarct.      Age-related volume loss and chronic microvascular ischemic changes.      DX-ABDOMEN FOR TUBE PLACEMENT   Final Result         1.  Nonspecific bowel gas pattern in the upper abdomen.   2.  Nasogastric tube tip terminates overlying the expected location of the pylorus or first duodenal segment.   3.  Atherosclerosis      DX-ABDOMEN FOR TUBE PLACEMENT   Final Result         1.  Nonspecific bowel gas pattern in the upper abdomen.   2.  Nasogastric tube with side-port at the gastroesophageal junction, recommend advancement.   3.  Right midlung nodular density, recommend follow-up CT chest for further characterization.   4.  Atherosclerosis      These findings were discussed  with the patient's clinician, Dr. Puente, on 11/4/2022 7:25 AM.      EC-ECHOCARDIOGRAM COMPLETE W/O CONT   Final Result      DX-CHEST-PORTABLE (1 VIEW)   Final Result      No acute cardiac or pulmonary abnormalities are identified.      CT-CTA NECK WITH & W/O-POST PROCESSING   Final Result      1.  Occluded LEFT extracranial internal carotid artery. This could be due to thromboembolic disease or dissection.   2.  Estimated 50-75% stenosis of the proximal RIGHT internal carotid artery   3.  Thyroid nodules      CT-CEREBRAL PERFUSION ANALYSIS   Final Result      1.  Moderate sized completed area of infarction noted in the left frontal parasylvian region.      CT-CTA HEAD WITH & W/O-POST PROCESS   Final Result      1.  Occlusion of the imaged extracranial LEFT internal carotid artery through the cavernous segment with reconstitution of the supraclinoid segment   2.  LEFT M3 branch occlusion            Findings were communicated with and acknowledged by Dr. Sultana via [employer] on 11/2/2022 2:34 PM.      CT-HEAD W/O   Final Result      1.  Moderate sized acute area of infarction involving the left frontal parasylvian region.      2.  Smaller encephalomalacia thinning to the cortex in the right parietal-occipital region.      3.  Mild age-related cerebral atrophy.                Assessment/Plan  * Acute CVA (cerebrovascular accident) (HCC)- (present on admission)  Assessment & Plan  MRI noted with left frontal insular temporal infarct    Continue aspirin atorvastatin    Evaluated by neurology with recommendation for Zio patch on discharge    PT/OT recommending postacute placement no accepting SNF discussed with case management who will follow up with family about applying for Medicaid  S/p peg     Thyroid nodule  Assessment & Plan  Noted on CTA of neck will need outpatient follow-up  TSH normal    Carotid stenosis  Assessment & Plan  Aspirin statin  Outpatient monitoring and follow-up    Vision loss  Assessment &  Plan  Will need outpatient ophthalmology evaluation    HLD (hyperlipidemia)  Assessment & Plan  Continue atorvastatin    Type 2 diabetes mellitus (HCC)  Assessment & Plan     Hemoglobin A1c 11.4    Patient will be n.p.o. for PEG tube placement tomorrow we will hold scheduled dose of Lantus 38 units and will give 15 units tonight to avoid hypoglycemia  Continue sliding-scale insulin monitor CBGs         VTE prophylaxis: SCDs/TEDs and enoxaparin ppx    I have performed a physical exam and reviewed and updated ROS and Plan today (11/18/2022). In review of yesterday's note (11/17/2022), there are no changes except as documented above.

## 2022-11-19 NOTE — CARE PLAN
Jacqueline appears oriented to self only. Nonverbal, sever expressive aphasia. Nodded head yes to generalized pain, given PRN tylenol x2 via PEG tube. Pt also had low grade fever throughout the shift around 100f. Q2 turns performed. Incontinent care performed. 1 carton diabetic source given at each mealtime today. PEG dressing changed x2. Appears to be tolerating tube feed. Vitals stable, hourly rounding performed.

## 2022-11-19 NOTE — PROGRESS NOTES
Hospital Medicine Daily Progress Note    Date of Service  11/19/2022    Chief Complaint  Jacqueline Webb is a 56 y.o. female admitted 11/2/2022 with altered mental status    Hospital Course  Jacqueline Webb is a 56 y.o. female who presented 11/2/2022 with a history of type 2 diabetes, high cholesterol, vision loss who presented with altered mental status.  ED patient was found to have a left-sided gaze preference, NIH 25.  Subsequent MRI noted with left frontal insular temporal infarct.  Evaluated by neurology recommended continue aspirin, Lipitor and follow-up with [facility].  PT/OT recommending postacute placement.         Interval Problem Update  11/19:glucose elevated, restart glargine at 10U. No change in patient condition. Medically stable    I have discussed this patient's plan of care and discharge plan at IDT rounds today with Case Management, Nursing, Nursing leadership, and other members of the IDT team.    Consultants/Specialty  neurology    Code Status  DNAR/DNI    Disposition  Patient is medically cleared for discharge.   Anticipate discharge to to home with organized home healthcare and close outpatient follow-up.  I have placed the appropriate orders for post-discharge needs.    Review of Systems  Review of Systems   Unable to perform ROS: Medical condition      Physical Exam  Temp:  [36.1 °C (97 °F)-37.2 °C (99 °F)] 37.2 °C (99 °F)  Pulse:  [77-97] 77  Resp:  [16-20] 18  BP: (102-111)/(68-81) 108/74  SpO2:  [98 %-100 %] 100 %    Physical Exam  Vitals and nursing note reviewed.   Constitutional:       General: She is not in acute distress.  HENT:      Head: Normocephalic and atraumatic.      Nose: Nose normal. No rhinorrhea.      Mouth/Throat:      Pharynx: No oropharyngeal exudate or posterior oropharyngeal erythema.   Eyes:      General: No scleral icterus.        Right eye: No discharge.         Left eye: No discharge.   Cardiovascular:      Rate and Rhythm: Normal rate and  regular rhythm.      Heart sounds: Normal heart sounds. No murmur heard.    No friction rub. No gallop.   Pulmonary:      Effort: Pulmonary effort is normal. No respiratory distress.      Breath sounds: Normal breath sounds. No stridor. No wheezing, rhonchi or rales.   Chest:      Chest wall: No tenderness.   Abdominal:      General: Bowel sounds are normal. There is no distension.      Palpations: Abdomen is soft. There is no mass.      Tenderness: There is no abdominal tenderness. There is no rebound.      Comments: PEG   Musculoskeletal:         General: No swelling or tenderness.      Cervical back: Neck supple. No rigidity.   Skin:     General: Skin is warm and dry.      Coloration: Skin is not cyanotic or jaundiced.      Nails: There is no clubbing.   Neurological:      Mental Status: She is alert.      Cranial Nerves: Cranial nerve deficit present.      Motor: Weakness present.      Comments: Expressive aphasia  Right hemiparesis 1/5     Psychiatric:         Mood and Affect: Mood normal.         Behavior: Behavior normal.       Fluids    Intake/Output Summary (Last 24 hours) at 11/19/2022 1128  Last data filed at 11/19/2022 0829  Gross per 24 hour   Intake 610 ml   Output --   Net 610 ml         Laboratory  Recent Labs     11/18/22  1002   WBC 12.0*   RBC 4.22   HEMOGLOBIN 12.2   HEMATOCRIT 38.4   MCV 91.0   MCH 28.9   MCHC 31.8*   RDW 38.0   PLATELETCT 461*   MPV 8.9*       Recent Labs     11/18/22  1002   SODIUM 142   POTASSIUM 3.9   CHLORIDE 99   CO2 35*   GLUCOSE 204*   BUN 33*   CREATININE 0.49*   CALCIUM 9.1                         Imaging  CT-CHEST (THORAX) WITH   Final Result      1.  No evidence of pulmonary nodule. Questioned pulmonary nodule on abdominal film due to callus formation from subacute/chronic right anterior fourth rib fracture.      2.  Porcelain gallbladder.      Fleischner Society pulmonary nodule recommendations:   Not Applicable         DX-ABDOMEN FOR TUBE PLACEMENT   Final Result       Enteric tube with catheter tip in the left upper quadrant consistent with intragastric location.      MR-BRAIN-W/O   Final Result         Acute infarct in the left frontal, insular and left medial temporal region. Small acute infarct also noted in the left internal capsule anterior limb and adjacent basal ganglia.      Occlusion of the left distal cervical and petrous ICA.      Remote right parietal infarct.      Age-related volume loss and chronic microvascular ischemic changes.      DX-ABDOMEN FOR TUBE PLACEMENT   Final Result         1.  Nonspecific bowel gas pattern in the upper abdomen.   2.  Nasogastric tube tip terminates overlying the expected location of the pylorus or first duodenal segment.   3.  Atherosclerosis      DX-ABDOMEN FOR TUBE PLACEMENT   Final Result         1.  Nonspecific bowel gas pattern in the upper abdomen.   2.  Nasogastric tube with side-port at the gastroesophageal junction, recommend advancement.   3.  Right midlung nodular density, recommend follow-up CT chest for further characterization.   4.  Atherosclerosis      These findings were discussed with the patient's clinician, Dr. Puente, on 11/4/2022 7:25 AM.      EC-ECHOCARDIOGRAM COMPLETE W/O CONT   Final Result      DX-CHEST-PORTABLE (1 VIEW)   Final Result      No acute cardiac or pulmonary abnormalities are identified.      CT-CTA NECK WITH & W/O-POST PROCESSING   Final Result      1.  Occluded LEFT extracranial internal carotid artery. This could be due to thromboembolic disease or dissection.   2.  Estimated 50-75% stenosis of the proximal RIGHT internal carotid artery   3.  Thyroid nodules      CT-CEREBRAL PERFUSION ANALYSIS   Final Result      1.  Moderate sized completed area of infarction noted in the left frontal parasylvian region.      CT-CTA HEAD WITH & W/O-POST PROCESS   Final Result      1.  Occlusion of the imaged extracranial LEFT internal carotid artery through the cavernous segment with reconstitution of the  supraclinoid segment   2.  LEFT M3 branch occlusion            Findings were communicated with and acknowledged by Dr. Sultana via Vo[employer] Me on 11/2/2022 2:34 PM.      CT-HEAD W/O   Final Result      1.  Moderate sized acute area of infarction involving the left frontal parasylvian region.      2.  Smaller encephalomalacia thinning to the cortex in the right parietal-occipital region.      3.  Mild age-related cerebral atrophy.                Assessment/Plan  * Acute CVA (cerebrovascular accident) (HCC)- (present on admission)  Assessment & Plan  MRI noted with left frontal insular temporal infarct    Continue aspirin atorvastatin    Evaluated by neurology with recommendation for Zio patch on discharge    PT/OT recommending postacute placement no accepting SNF discussed with case management who will follow up with family about applying for Medicaid  S/p peg     Thyroid nodule  Assessment & Plan  Noted on CTA of neck will need outpatient follow-up  TSH normal    Carotid stenosis  Assessment & Plan  Aspirin statin  Outpatient monitoring and follow-up    Vision loss  Assessment & Plan  Will need outpatient ophthalmology evaluation    HLD (hyperlipidemia)  Assessment & Plan  Continue atorvastatin    Type 2 diabetes mellitus (HCC)  Assessment & Plan     Hemoglobin A1c 11.4    Patient will be n.p.o. for PEG tube placement tomorrow we will hold scheduled dose of Lantus 38 units and will give 15 units tonight to avoid hypoglycemia  Continue sliding-scale insulin monitor CBGs         VTE prophylaxis: SCDs/TEDs and enoxaparin ppx    I have performed a physical exam and reviewed and updated ROS and Plan today (11/19/2022). In review of yesterday's note (11/18/2022), there are no changes except as documented above.

## 2022-11-19 NOTE — PROGRESS NOTES
Received report from Christiana MALIK  pt able to follw some commands   at times will assist with turns

## 2022-11-19 NOTE — CARE PLAN
The patient is Stable - Low risk of patient condition declining or worsening    Shift Goals  Clinical Goals: Nerological stability  Patient Goals: DALE  Family Goals: DALE    Progress made toward(s) clinical / shift goals:    Problem: Knowledge Deficit - Stroke Education  Goal: Patient's knowledge of stroke and risk factors will improve  Outcome: Not Progressing     Problem: Optimal Care of the Stroke Patient  Goal: Optimal emergency care for the stroke patient  Outcome: Progressing     Problem: Optimal Care of the Stroke Patient  Goal: Optimal acute care for the stroke patient  Outcome: Progressing       Patient is not progressing towards the following goals:      Problem: Knowledge Deficit - Stroke Education  Goal: Patient's knowledge of stroke and risk factors will improve  Outcome: Not Progressing

## 2022-11-20 NOTE — PROGRESS NOTES
Hospital Medicine Daily Progress Note    Date of Service  11/20/2022    Chief Complaint  Jacqueline Webb is a 56 y.o. female admitted 11/2/2022 with altered mental status    Hospital Course  Jacqueline Webb is a 56 y.o. female who presented 11/2/2022 with a history of type 2 diabetes, high cholesterol, vision loss who presented with altered mental status.  ED patient was found to have a left-sided gaze preference, NIH 25.  Subsequent MRI noted with left frontal insular temporal infarct.  Evaluated by neurology recommended continue aspirin, Lipitor and follow-up with outpatient with [facility].  PT/OT recommending postacute placement.         Interval Problem Update  11/19:glucose elevated, restart glargine at 10U. No change in patient condition. Medically stable  11/20: Glucose improved to 172.  No overnight events vitals are stable.  Patient will discharge Monday or Tuesday    I have discussed this patient's plan of care and discharge plan at IDT rounds today with Case Management, Nursing, Nursing leadership, and other members of the IDT team.    Consultants/Specialty  neurology    Code Status  DNAR/DNI    Disposition  Patient is medically cleared for discharge.   Anticipate discharge to to home with organized home healthcare and close outpatient follow-up.  I have placed the appropriate orders for post-discharge needs.    Review of Systems  Review of Systems   Unable to perform ROS: Medical condition      Physical Exam  Temp:  [36.1 °C (97 °F)-36.8 °C (98.2 °F)] 36.4 °C (97.5 °F)  Pulse:  [76-83] 80  Resp:  [16-20] 18  BP: ()/(48-63) 101/49  SpO2:  [95 %-100 %] 95 %    Physical Exam  Vitals and nursing note reviewed.   Constitutional:       General: She is not in acute distress.  HENT:      Head: Normocephalic and atraumatic.      Nose: Nose normal. No rhinorrhea.      Mouth/Throat:      Pharynx: No oropharyngeal exudate or posterior oropharyngeal erythema.   Eyes:      General: No scleral icterus.         Right eye: No discharge.         Left eye: No discharge.   Cardiovascular:      Rate and Rhythm: Normal rate and regular rhythm.      Heart sounds: Normal heart sounds. No murmur heard.    No friction rub. No gallop.   Pulmonary:      Effort: Pulmonary effort is normal. No respiratory distress.      Breath sounds: Normal breath sounds. No stridor. No wheezing, rhonchi or rales.   Chest:      Chest wall: No tenderness.   Abdominal:      General: Bowel sounds are normal. There is no distension.      Palpations: Abdomen is soft. There is no mass.      Tenderness: There is no abdominal tenderness. There is no rebound.      Comments: PEG   Musculoskeletal:         General: No swelling or tenderness.      Cervical back: Neck supple. No rigidity.   Skin:     General: Skin is warm and dry.      Coloration: Skin is not cyanotic or jaundiced.      Nails: There is no clubbing.   Neurological:      Mental Status: She is alert.      Cranial Nerves: Cranial nerve deficit present.      Motor: Weakness present.      Comments: Expressive aphasia  Right hemiparesis 1/5     Psychiatric:         Mood and Affect: Mood normal.         Behavior: Behavior normal.       Fluids    Intake/Output Summary (Last 24 hours) at 11/20/2022 1209  Last data filed at 11/20/2022 0600  Gross per 24 hour   Intake 1060 ml   Output 0 ml   Net 1060 ml         Laboratory  Recent Labs     11/18/22  1002   WBC 12.0*   RBC 4.22   HEMOGLOBIN 12.2   HEMATOCRIT 38.4   MCV 91.0   MCH 28.9   MCHC 31.8*   RDW 38.0   PLATELETCT 461*   MPV 8.9*       Recent Labs     11/18/22  1002   SODIUM 142   POTASSIUM 3.9   CHLORIDE 99   CO2 35*   GLUCOSE 204*   BUN 33*   CREATININE 0.49*   CALCIUM 9.1                         Imaging  CT-CHEST (THORAX) WITH   Final Result      1.  No evidence of pulmonary nodule. Questioned pulmonary nodule on abdominal film due to callus formation from subacute/chronic right anterior fourth rib fracture.      2.  Porcelain gallbladder.       Fleischner Society pulmonary nodule recommendations:   Not Applicable         DX-ABDOMEN FOR TUBE PLACEMENT   Final Result      Enteric tube with catheter tip in the left upper quadrant consistent with intragastric location.      MR-BRAIN-W/O   Final Result         Acute infarct in the left frontal, insular and left medial temporal region. Small acute infarct also noted in the left internal capsule anterior limb and adjacent basal ganglia.      Occlusion of the left distal cervical and petrous ICA.      Remote right parietal infarct.      Age-related volume loss and chronic microvascular ischemic changes.      DX-ABDOMEN FOR TUBE PLACEMENT   Final Result         1.  Nonspecific bowel gas pattern in the upper abdomen.   2.  Nasogastric tube tip terminates overlying the expected location of the pylorus or first duodenal segment.   3.  Atherosclerosis      DX-ABDOMEN FOR TUBE PLACEMENT   Final Result         1.  Nonspecific bowel gas pattern in the upper abdomen.   2.  Nasogastric tube with side-port at the gastroesophageal junction, recommend advancement.   3.  Right midlung nodular density, recommend follow-up CT chest for further characterization.   4.  Atherosclerosis      These findings were discussed with the patient's clinician, Dr. Puente, on 11/4/2022 7:25 AM.      EC-ECHOCARDIOGRAM COMPLETE W/O CONT   Final Result      DX-CHEST-PORTABLE (1 VIEW)   Final Result      No acute cardiac or pulmonary abnormalities are identified.      CT-CTA NECK WITH & W/O-POST PROCESSING   Final Result      1.  Occluded LEFT extracranial internal carotid artery. This could be due to thromboembolic disease or dissection.   2.  Estimated 50-75% stenosis of the proximal RIGHT internal carotid artery   3.  Thyroid nodules      CT-CEREBRAL PERFUSION ANALYSIS   Final Result      1.  Moderate sized completed area of infarction noted in the left frontal parasylvian region.      CT-CTA HEAD WITH & W/O-POST PROCESS   Final Result      1.   Occlusion of the imaged extracranial LEFT internal carotid artery through the cavernous segment with reconstitution of the supraclinoid segment   2.  LEFT M3 branch occlusion            Findings were communicated with and acknowledged by Dr. Sultana via [employer] on 11/2/2022 2:34 PM.      CT-HEAD W/O   Final Result      1.  Moderate sized acute area of infarction involving the left frontal parasylvian region.      2.  Smaller encephalomalacia thinning to the cortex in the right parietal-occipital region.      3.  Mild age-related cerebral atrophy.                Assessment/Plan  * Acute CVA (cerebrovascular accident) (HCC)- (present on admission)  Assessment & Plan  MRI noted with left frontal insular temporal infarct    Continue aspirin atorvastatin    Evaluated by neurology with recommendation for Zio patch on discharge    PT/OT recommending postacute placement no accepting SNF discussed with case management who will follow up with family about applying for Medicaid  S/p peg     Thyroid nodule  Assessment & Plan  Noted on CTA of neck will need outpatient follow-up  TSH normal    Carotid stenosis  Assessment & Plan  Aspirin statin  Outpatient monitoring and follow-up    Vision loss  Assessment & Plan  Will need outpatient ophthalmology evaluation    HLD (hyperlipidemia)  Assessment & Plan  Continue atorvastatin    Type 2 diabetes mellitus (HCC)  Assessment & Plan     Hemoglobin A1c 11.4    Patient will be n.p.o. for PEG tube placement tomorrow we will hold scheduled dose of Lantus 38 units and will give 15 units tonight to avoid hypoglycemia  Continue sliding-scale insulin monitor CBGs         VTE prophylaxis: SCDs/TEDs and enoxaparin ppx    I have performed a physical exam and reviewed and updated ROS and Plan today (11/20/2022). In review of yesterday's note (11/19/2022), there are no changes except as documented above.

## 2022-11-20 NOTE — CARE PLAN
The patient is Stable - Low risk of patient condition declining or worsening    Shift Goals  Clinical Goals: comfort  Patient Goals: pt will be able to rest and sleep comfortably  Family Goals: DALE    Progress made toward(s) clinical / shift goals:      Patient is not progressing towards the following goals:

## 2022-11-20 NOTE — PROGRESS NOTES
Unable to assess orientation due to nonverbal. Feeding done with 1 carton of diabetisource bolus. Safety precautions in placed. Bed in lowest position. Upper side rails up. Treaded socks on. Reinforced the use of call light when needing assistance. Bed alarm is on.

## 2022-11-21 NOTE — THERAPY
Occupational Therapy  Daily Treatment     Patient Name: Jacqueline Webb  Age:  56 y.o., Sex:  female  Medical Record #: [id number]  Today's Date: 11/21/2022       Precautions: Fall Risk, Swallow Precautions ( See Comments), PEG Tube  Comments: aphasic, R side deficits    Assessment    Pt seen for OT tx. Continues to be limited by decreased activity tolerance, balance deficits, inattention and RUE weakness impacting ability to complete ADLs and ADL transfers independently. Improved command following during session but continues to be nonverbal. Mod A supine > sit, mod A sit > stand, took small steps and weight shifting to initiate BSC txfs. Will continue to benefit from OT services while in house.     Plan    Continue current treatment plan.    DC Equipment Recommendations: Bed Side Commode  Discharge Recommendations: Recommend post-acute placement for additional occupational therapy services prior to discharge home       11/21/22 1031   Cognition    Cognition / Consciousness X   Safety Awareness Impaired   New Learning Impaired   Attention Impaired   Active ROM Upper Body   Active ROM Upper Body  X   Comments RUE flaccid, LUE ROM limited by weakness but able to assist w/ ADLs   Strength Upper Body   Upper Body Strength  X   Comments RUE flaccid, LUE WFL   Balance   Sitting Balance (Static) Fair -   Sitting Balance (Dynamic) Poor +   Bed Mobility    Supine to Sit Moderate Assist   Sit to Supine Moderate Assist   Activities of Daily Living   Grooming Seated;Minimal Assist   Upper Body Dressing Moderate Assist   Lower Body Dressing Maximal Assist   Toileting Maximal Assist   Functional Mobility   Sit to Stand Moderate Assist   Bed, Chair, Wheelchair Transfer Moderate Assist   Short Term Goals   Short Term Goal # 1 seated light grooming using left UE with min a.   Goal Outcome # 1 Progressing as expected   Short Term Goal # 2 Toilleting with min a on BSC or in bathroom.   Goal Outcome # 2 Progressing slower than expected    Anticipated Discharge Equipment and Recommendations   DC Equipment Recommendations Bed Side Commode   Discharge Recommendations Recommend post-acute placement for additional occupational therapy services prior to discharge home

## 2022-11-21 NOTE — FACE TO FACE
"Face to Face Note  -  Durable Medical Equipment    Sánchez Olivia D.O. - NPI: [id number]  I certify that this patient is under my care and that they had a durable medical equipment(DME)face to face encounter by myself that meets the physician DME face-to-face encounter requirements with this patient on:    Date of encounter:   Patient:                    MRN:                       YOB: 2024  Jacqueline Webb  [id number]  1965     The encounter with the patient was in whole, or in part, for the following medical condition, which is the primary reason for durable medical equipment:  CVA and Other - acute respiratory failure with hypoxia    I certify that, based on my findings, the following durable medical equipment is medically necessary:    Oxygen   HOME O2 Saturation Measurements:(Values must be present for Home Oxygen orders)  Room air sat at rest: 84  Room air sat with amb: N/A  With liters of O2: 2, O2 sat at rest with O2: 94  With Liters of O2: N/A, O2 sat with amb with O2 : N/A  Is the patient mobile?: No  If patient feels more short of breath, they can go up to 6 liters per minute and contact healthcare provider.    Supporting Symptoms: The patient requires supplemental oxygen, as the following interventions have been tried with limited or no improvement: \"Positive expiratory pressure therapies and \"Incentive spirometry.    My Clinical findings support the need for the above equipment due to:  Hypoxia  "

## 2022-11-21 NOTE — DISCHARGE PLANNING
Agency/Facility Name: [facility]  Outcome: DPA received a voice message from [facility] regarding DME referral.    Per LSW Rigoberto, [facility] will give the pt 7 days of supplies.    @[id number]  Agency/Facility Name: [facility]  Spoke To: Tia  Outcome: DPA called and requested the DME referral be cancelled.

## 2022-11-21 NOTE — PROGRESS NOTES
Received bedside report from night shift RN.   Assumed care of patient at change of shift.   Unable to assess orientation due to patient being nonverbal.  No apparent signs of distress or discomfort.   Arrived in patient's room with mask off, patient satting 84%.  Placed patient back on 2L via mask, now satting at 92-94%.  BP 94/53 this AM, pressures have been running soft. MAP = 67.  PEG tube in place for bolus feeds, site is [facility].  200 mL free water flushes given Q 6 hours.  Hypoactive bowel sounds in all 4 quadrants.  Multiple bowel movements over the last couple of days.   Lungs clear in upper lobes, diminished in lower lobes.  Q 2 hour turns implemented.  SCDs on bilateral lower extremities.  Bed is in lowest/locked position.   Call light and belongings are within reach.   No further needs at this time.

## 2022-11-21 NOTE — CARE PLAN
The patient is Stable - Low risk of patient condition declining or worsening    Shift Goals  Clinical Goals: Continue to tolerate bolus feeds, safety, skin integrity  Patient Goals: rest  Family Goals: DALE    Progress made toward(s) clinical / shift goals:  Patient tolerating bolus feeds, no nausea or vomiting. Safety precautions in place (bed in lowest/locked position, call light and belongings within reach, desk bed, non-skid socks). Skin integrity maintained with waffle overlay, prevalon boots, mepilex, Q2 hour turns, pillows    Patient is not progressing towards the following goals:  Problem: Knowledge Deficit - Stroke Education  Goal: Patient's knowledge of stroke and risk factors will improve  Outcome: Not Progressing

## 2022-11-21 NOTE — THERAPY
Physical Therapy   Daily Treatment     Patient Name: Jacqueline Webb  Age:  56 y.o., Sex:  female  Medical Record #: [id number]  Today's Date: 11/21/2022     Precautions  Precautions: Fall Risk;Swallow Precautions ( See Comments);PEG Tube  Comments: aphasic, R side deficits    Assessment    Pt remains non verbal but able to follow simple commands and visuals. Improved ability to sit EOB. Able to stand with assist, however poor ability to weight shift. When facilitated by therapist able to take lateral steps along HOB (therapist facilitated weight shift and movement of R foot).     Aware of patient's lack of insurance and sister's concerns regarding discharge home. Pt not safe to ambulate without skilled caregiver/therapist assistance. Sister has established she is uncomfortable mobilizing patient. While not optimal, as patient is able to roll independently and re position herself in bed (for cleaning, linen changing) would recommend for patient's safety be bed bound and work on transfers/wheelchair mobility with home health therapies and/or paid caregivers if obtained. Acute PT to continue to follow.    Plan    Treatment plan modified to 3 times per week until therapy goals are met for the following treatments:  Bed Mobility, Gait Training, Neuro Re-Education / Balance, Therapeutic Activities, and Therapeutic Exercises.    DC Equipment Recommendations: Wheelchair (hospital bed, lisa)  Discharge Recommendations: Recommend post-acute placement for additional physical therapy services prior to discharge home           Objective       11/21/22 0931   Cognition    Level of Consciousness Responds to voice   Ability To Follow Commands 1 Step   Safety Awareness Impaired   New Learning Impaired   Attention Impaired   Sequencing Impaired   Initiation Impaired   Comments followed simple commands and visual cues   Balance   Sitting Balance (Static) Fair   Sitting Balance (Dynamic) Fair -   Standing Balance (Static) Poor -    Standing Balance (Dynamic) Trace +   Weight Shift Sitting Fair   Weight Shift Standing Poor   Skilled Intervention Verbal Cuing;Tactile Cuing;Sequencing;Postural Facilitation   Comments posterior lean, poor R weight shift   Gait Analysis   Gait Level Of Assist Moderate Assist   Assistive Device Hand Held Assist   Distance (Feet) 4  (ft lateral side step)   Deviation Shuffled Gait;Bradykinetic;Ataxic   Weight Bearing Status no restrictions   Skilled Intervention Verbal Cuing;Sequencing;Postural Facilitation;Tactile Cuing   Bed Mobility    Supine to Sit Moderate Assist   Sit to Supine Moderate Assist   Scooting Moderate Assist   Skilled Intervention Verbal Cuing;Tactile Cuing;Sequencing   Functional Mobility   Sit to Stand Moderate Assist   Skilled Intervention Verbal Cuing;Sequencing;Tactile Cuing   How much difficulty does the patient currently have...   Turning over in bed (including adjusting bedclothes, sheets and blankets)? 2   Sitting down on and standing up from a chair with arms (e.g., wheelchair, bedside commode, etc.) 1   Moving from lying on back to sitting on the side of the bed? 1   How much help from another person does the patient currently need...   Moving to and from a bed to a chair (including a wheelchair)? 2   Need to walk in a hospital room? 2   Climbing 3-5 steps with a railing? 1   6 clicks Mobility Score 9   Short Term Goals    Short Term Goal # 1 Pt will perform supine <> sit with min A within 6 visits to progress bed mobility   Goal Outcome # 1 goal not met   Short Term Goal # 2 Pt will perform STS with min A and LRAD within 6 visits to progress OOB mobility   Goal Outcome # 2 Goal not met   Short Term Goal # 3 Pt will perform functional transfers with LRAD and min A within 6 visits within 6 visits to progress OOB mobility   Goal Outcome # 3 Goal not met   Short Term Goal # 4 Pt will ambulate 25 ft with LRAD and mod A within 6 visits to initiate gait training   Goal Outcome # 4 Goal not  met

## 2022-11-21 NOTE — PROGRESS NOTES
Hospital Medicine Daily Progress Note    Date of Service  11/21/2022    Chief Complaint  Jacqueline Webb is a 56 y.o. female admitted 11/2/2022 with altered mental status    Hospital Course  Jacqueline Webb is a 56 y.o. female who presented 11/2/2022 with a history of type 2 diabetes, high cholesterol, vision loss who presented with altered mental status.  ED patient was found to have a left-sided gaze preference, NIH 25.  Subsequent MRI noted with left frontal insular temporal infarct.  Evaluated by neurology recommended continue aspirin, Lipitor and follow-up with outpatient [facility].  PT/OT recommending postacute placement.         Interval Problem Update  11/19:glucose elevated, restart glargine at 10U. No change in patient condition. Medically stable  11/20: Glucose improved to 172.  No overnight events vitals are stable.  Patient will discharge Monday or Tuesday 11/21: No overnight events.  Case management working with the sister to  the patient.    I have discussed this patient's plan of care and discharge plan at IDT rounds today with Case Management, Nursing, Nursing leadership, and other members of the IDT team.    Consultants/Specialty  neurology    Code Status  DNAR/DNI    Disposition  Patient is medically cleared for discharge.   Anticipate discharge to to home with organized home healthcare and close outpatient follow-up.  I have placed the appropriate orders for post-discharge needs.    Review of Systems  Review of Systems   Unable to perform ROS: Medical condition      Physical Exam  Temp:  [35.9 °C (96.7 °F)-36.7 °C (98 °F)] 36.6 °C (97.8 °F)  Pulse:  [68-85] 85  Resp:  [16-18] 16  BP: (88-95)/(39-56) 94/53  SpO2:  [84 %-100 %] 93 %    Physical Exam  Vitals and nursing note reviewed.   Constitutional:       General: She is not in acute distress.  HENT:      Head: Normocephalic and atraumatic.      Nose: Nose normal. No rhinorrhea.      Mouth/Throat:      Pharynx: No  oropharyngeal exudate or posterior oropharyngeal erythema.   Eyes:      General: No scleral icterus.        Right eye: No discharge.         Left eye: No discharge.   Cardiovascular:      Rate and Rhythm: Normal rate and regular rhythm.      Heart sounds: Normal heart sounds. No murmur heard.    No friction rub. No gallop.   Pulmonary:      Effort: Pulmonary effort is normal. No respiratory distress.      Breath sounds: Normal breath sounds. No stridor. No wheezing, rhonchi or rales.   Chest:      Chest wall: No tenderness.   Abdominal:      General: Bowel sounds are normal. There is no distension.      Palpations: Abdomen is soft. There is no mass.      Tenderness: There is no abdominal tenderness. There is no rebound.      Comments: PEG   Musculoskeletal:         General: No swelling or tenderness.      Cervical back: Neck supple. No rigidity.   Skin:     General: Skin is warm and dry.      Coloration: Skin is not cyanotic or jaundiced.      Nails: There is no clubbing.   Neurological:      Mental Status: She is alert.      Cranial Nerves: Cranial nerve deficit present.      Motor: Weakness present.      Comments: Expressive aphasia  Right hemiparesis 1/5     Psychiatric:         Mood and Affect: Mood normal.         Behavior: Behavior normal.       Fluids    Intake/Output Summary (Last 24 hours) at 11/21/2022 1126  Last data filed at 11/21/2022 0529  Gross per 24 hour   Intake 1400 ml   Output --   Net 1400 ml         Laboratory                                Imaging  CT-CHEST (THORAX) WITH   Final Result      1.  No evidence of pulmonary nodule. Questioned pulmonary nodule on abdominal film due to callus formation from subacute/chronic right anterior fourth rib fracture.      2.  Porcelain gallbladder.      Fleischner Society pulmonary nodule recommendations:   Not Applicable         DX-ABDOMEN FOR TUBE PLACEMENT   Final Result      Enteric tube with catheter tip in the left upper quadrant consistent with  intragastric location.      MR-BRAIN-W/O   Final Result         Acute infarct in the left frontal, insular and left medial temporal region. Small acute infarct also noted in the left internal capsule anterior limb and adjacent basal ganglia.      Occlusion of the left distal cervical and petrous ICA.      Remote right parietal infarct.      Age-related volume loss and chronic microvascular ischemic changes.      DX-ABDOMEN FOR TUBE PLACEMENT   Final Result         1.  Nonspecific bowel gas pattern in the upper abdomen.   2.  Nasogastric tube tip terminates overlying the expected location of the pylorus or first duodenal segment.   3.  Atherosclerosis      DX-ABDOMEN FOR TUBE PLACEMENT   Final Result         1.  Nonspecific bowel gas pattern in the upper abdomen.   2.  Nasogastric tube with side-port at the gastroesophageal junction, recommend advancement.   3.  Right midlung nodular density, recommend follow-up CT chest for further characterization.   4.  Atherosclerosis      These findings were discussed with the patient's clinician, Dr. Puente, on 11/4/2022 7:25 AM.      EC-ECHOCARDIOGRAM COMPLETE W/O CONT   Final Result      DX-CHEST-PORTABLE (1 VIEW)   Final Result      No acute cardiac or pulmonary abnormalities are identified.      CT-CTA NECK WITH & W/O-POST PROCESSING   Final Result      1.  Occluded LEFT extracranial internal carotid artery. This could be due to thromboembolic disease or dissection.   2.  Estimated 50-75% stenosis of the proximal RIGHT internal carotid artery   3.  Thyroid nodules      CT-CEREBRAL PERFUSION ANALYSIS   Final Result      1.  Moderate sized completed area of infarction noted in the left frontal parasylvian region.      CT-CTA HEAD WITH & W/O-POST PROCESS   Final Result      1.  Occlusion of the imaged extracranial LEFT internal carotid artery through the cavernous segment with reconstitution of the supraclinoid segment   2.  LEFT M3 branch occlusion            Findings were  communicated with and acknowledged by Dr. Sultana via Vo[employer] Me on 11/2/2022 2:34 PM.      CT-HEAD W/O   Final Result      1.  Moderate sized acute area of infarction involving the left frontal parasylvian region.      2.  Smaller encephalomalacia thinning to the cortex in the right parietal-occipital region.      3.  Mild age-related cerebral atrophy.                Assessment/Plan  * Acute CVA (cerebrovascular accident) (HCC)- (present on admission)  Assessment & Plan  MRI noted with left frontal insular temporal infarct    Continue aspirin atorvastatin    Evaluated by neurology with recommendation for Zio patch on discharge    PT/OT recommending postacute placement no accepting SNF discussed with case management who will follow up with family about applying for Medicaid  S/p peg     Thyroid nodule  Assessment & Plan  Noted on CTA of neck will need outpatient follow-up  TSH normal    Carotid stenosis  Assessment & Plan  Aspirin statin  Outpatient monitoring and follow-up    Vision loss  Assessment & Plan  Will need outpatient ophthalmology evaluation    HLD (hyperlipidemia)  Assessment & Plan  Continue atorvastatin    Type 2 diabetes mellitus (HCC)  Assessment & Plan     Hemoglobin A1c 11.4    Patient will be n.p.o. for PEG tube placement tomorrow we will hold scheduled dose of Lantus 38 units and will give 15 units tonight to avoid hypoglycemia  Continue sliding-scale insulin monitor CBGs         VTE prophylaxis: SCDs/TEDs and enoxaparin ppx    I have performed a physical exam and reviewed and updated ROS and Plan today (11/21/2022). In review of yesterday's note (11/20/2022), there are no changes except as documented above.

## 2022-11-21 NOTE — DISCHARGE PLANNING
Received Choice form at 9966  Agency/Facility Name: [facility]  Referral sent per Choice form @ 1017

## 2022-11-22 PROBLEM — R13.10 DYSPHAGIA: Status: ACTIVE | Noted: 2022-01-01

## 2022-11-22 NOTE — PROGRESS NOTES
[facility] Medicine Daily Progress Note    Date of Service  11/22/2022    Chief Complaint  Jacqueline Webb is a 56 y.o. female admitted 11/2/2022 with altered mental status    Hospital Course  Jacqueline Webb is a 56 y.o. female who presented 11/2/2022 with a history of type 2 diabetes, high cholesterol, vision loss who presented with altered mental status.  ED patient was found to have a left-sided gaze preference, NIH 25.  Subsequent MRI noted with left frontal insular temporal infarct.  Evaluated by neurology recommended continue aspirin, Lipitor and follow-up with outpatient with [facility].  PT/OT recommending postacute placement but patient has no insurance or payor source. Case management working with family to arrange discharge to home with family assistance.     Interval Problem Update  Non verbal, on 2L of oxygen - discussed with nursing will try to taper off, unclear she she really needs this. Patient is alert, she denies pain, responds by nodding, following commands. ROS otherwise negative or unobtainable.     I have discussed this patient's plan of care and discharge plan at IDT rounds today with Case Management, Nursing, Nursing leadership, and other members of the IDT team.    Consultants/Specialty  neurology    Code Status  DNAR/DNI    Disposition  Patient is medically cleared for discharge.   Anticipate discharge to to home with organized home healthcare and close outpatient follow-up.  I have placed the appropriate orders for post-discharge needs.    Review of Systems  Review of Systems   Unable to perform ROS: Medical condition   Constitutional: Negative.    HENT: Negative.     Eyes: Negative.    Respiratory: Negative.     Cardiovascular: Negative.    Gastrointestinal: Negative.    Genitourinary: Negative.    Musculoskeletal: Negative.    Skin: Negative.    Neurological: Negative.    Endo/Heme/Allergies: Negative.    Psychiatric/Behavioral: Negative.     All other systems reviewed and are  negative.     Physical Exam  Temp:  [36.4 °C (97.5 °F)-36.7 °C (98 °F)] 36.7 °C (98 °F)  Pulse:  [77-90] 81  Resp:  [14-16] 14  BP: ()/(42-74) 109/74  SpO2:  [96 %-99 %] 96 %    Physical Exam  Vitals and nursing note reviewed.   Constitutional:       General: She is not in acute distress.  HENT:      Head: Normocephalic and atraumatic.      Nose: Nose normal. No rhinorrhea.      Mouth/Throat:      Pharynx: No oropharyngeal exudate or posterior oropharyngeal erythema.   Eyes:      General: No scleral icterus.        Right eye: No discharge.         Left eye: No discharge.   Cardiovascular:      Rate and Rhythm: Normal rate and regular rhythm.      Heart sounds: Normal heart sounds. No murmur heard.    No friction rub. No gallop.   Pulmonary:      Effort: Pulmonary effort is normal. No respiratory distress.      Breath sounds: Normal breath sounds. No stridor. No wheezing, rhonchi or rales.   Chest:      Chest wall: No tenderness.   Abdominal:      General: Bowel sounds are normal. There is no distension.      Palpations: Abdomen is soft. There is no mass.      Tenderness: There is no abdominal tenderness. There is no rebound.      Comments: PEG   Musculoskeletal:         General: No swelling or tenderness.      Cervical back: Neck supple. No rigidity.   Skin:     General: Skin is warm and dry.      Coloration: Skin is not cyanotic or jaundiced.      Nails: There is no clubbing.   Neurological:      Mental Status: She is alert.      Cranial Nerves: Cranial nerve deficit present.      Motor: Weakness present.      Comments: Expressive aphasia  Right hemiparesis 1/5     Psychiatric:         Mood and Affect: Mood normal.         Behavior: Behavior normal.       Fluids    Intake/Output Summary (Last 24 hours) at 11/22/2022 1255  Last data filed at 11/22/2022 0836  Gross per 24 hour   Intake 1450 ml   Output --   Net 1450 ml       Laboratory                                Imaging  CT-CHEST (THORAX) WITH   Final  Result      1.  No evidence of pulmonary nodule. Questioned pulmonary nodule on abdominal film due to callus formation from subacute/chronic right anterior fourth rib fracture.      2.  Porcelain gallbladder.      Fleischner Society pulmonary nodule recommendations:   Not Applicable         DX-ABDOMEN FOR TUBE PLACEMENT   Final Result      Enteric tube with catheter tip in the left upper quadrant consistent with intragastric location.      MR-BRAIN-W/O   Final Result         Acute infarct in the left frontal, insular and left medial temporal region. Small acute infarct also noted in the left internal capsule anterior limb and adjacent basal ganglia.      Occlusion of the left distal cervical and petrous ICA.      Remote right parietal infarct.      Age-related volume loss and chronic microvascular ischemic changes.      DX-ABDOMEN FOR TUBE PLACEMENT   Final Result         1.  Nonspecific bowel gas pattern in the upper abdomen.   2.  Nasogastric tube tip terminates overlying the expected location of the pylorus or first duodenal segment.   3.  Atherosclerosis      DX-ABDOMEN FOR TUBE PLACEMENT   Final Result         1.  Nonspecific bowel gas pattern in the upper abdomen.   2.  Nasogastric tube with side-port at the gastroesophageal junction, recommend advancement.   3.  Right midlung nodular density, recommend follow-up CT chest for further characterization.   4.  Atherosclerosis      These findings were discussed with the patient's clinician, Dr. Puente, on 11/4/2022 7:25 AM.      EC-ECHOCARDIOGRAM COMPLETE W/O CONT   Final Result      DX-CHEST-PORTABLE (1 VIEW)   Final Result      No acute cardiac or pulmonary abnormalities are identified.      CT-CTA NECK WITH & W/O-POST PROCESSING   Final Result      1.  Occluded LEFT extracranial internal carotid artery. This could be due to thromboembolic disease or dissection.   2.  Estimated 50-75% stenosis of the proximal RIGHT internal carotid artery   3.  Thyroid nodules       CT-CEREBRAL PERFUSION ANALYSIS   Final Result      1.  Moderate sized completed area of infarction noted in the left frontal parasylvian region.      CT-CTA HEAD WITH & W/O-POST PROCESS   Final Result      1.  Occlusion of the imaged extracranial LEFT internal carotid artery through the cavernous segment with reconstitution of the supraclinoid segment   2.  LEFT M3 branch occlusion            Findings were communicated with and acknowledged by Dr. Sultana via Voalte Me on 11/2/2022 2:34 PM.      CT-HEAD W/O   Final Result      1.  Moderate sized acute area of infarction involving the left frontal parasylvian region.      2.  Smaller encephalomalacia thinning to the cortex in the right parietal-occipital region.      3.  Mild age-related cerebral atrophy.                Assessment/Plan  * Acute CVA (cerebrovascular accident) (HCC)- (present on admission)  Assessment & Plan  MRI noted with left frontal insular temporal infarct    Continue aspirin atorvastatin    Evaluated by neurology with recommendation for Zio patch on discharge    PT/OT recommending postacute placement no accepting SNF discussed with case management who will follow up with family about applying for Medicaid - plan to return to home with family in the meantime - case management arranging some follow up  S/p peg     Dysphagia  Assessment & Plan  Status post stroke  Speech following  S/p peg  Fees today    Thyroid nodule  Assessment & Plan  Noted on CTA of neck will need outpatient follow-up  TSH normal    Carotid stenosis  Assessment & Plan  Aspirin statin  Outpatient monitoring and follow-up    Vision loss  Assessment & Plan  Will need outpatient ophthalmology evaluation    HLD (hyperlipidemia)  Assessment & Plan  Continue atorvastatin    Type 2 diabetes mellitus (HCC)  Assessment & Plan     Hemoglobin A1c 11.4    Continue sliding-scale insulin       VTE prophylaxis: SCDs/TEDs and enoxaparin ppx    I have performed a physical exam and reviewed  and updated ROS and Plan today (11/22/2022). In review of yesterday's note (11/21/2022), there are no changes except as documented above.

## 2022-11-22 NOTE — PROGRESS NOTES
Assumed care of patient from dayshift RN  Pt nonverbal, unable to assess orientation. However, responds to name. Nods yes/no appropriately and follows commands.  +right facial droop  Right arm paralysis, RLE weakness  2L NC  Strict NPO  Peg in place. Tolerating QID Diabetisource feeds with 200 q6 Flush.  NO s/s of aspiration  Incontinent x2. Purewick in place  +BM 11/21  Q2 turns. No signs of skin injury  Heels floated, waffle overlay in place    Am Diabetisource given. Tolerated well.

## 2022-11-22 NOTE — THERAPY
"Speech Language Pathology  Daily Treatment     Patient Name: Jacqueline Webb  Age:  56 y.o., Sex:  female  Medical Record #: [id number]  Today's Date: 11/22/2022     Precautions  Precautions: Fall Risk, Swallow Precautions ( See Comments), PEG Tube  Comments: aphasic, R side deficits    Assessment    APHASIA TREATMENT    Completed low level aphasia therapy targeting receptive language. Patient shaking his head \"no\" to all questions, but does not seem consistent with patient refusing. I.e patient shakes head no but opens her mouth to accept all PO trials. Attempted therapy to address following simple 1 step commands, patient able to complete 1/5 commands (wiggle your toes). She was unable to point or gesture toward body parts, and unable to complete commands provided.     DYSPHAGIA TREATMENT     Patient provided PO trials of thin liquids, milldy thick liquids and liquidized textures. Patient with appropriate oral acceptance with complete bolus stripping from utensil. Anterior bolus loss from R side of oral cavity with thin and mildly thick liquids. The patient had immediate coughing with tsp sips of thin liquids. No cough or overt s/sx of aspiration with mildly thick liquids or liquidized textures. Patient triggering multiple swallows per bolus concerning for pharyngeal residue. The patient would benefit from a repeat diagnostic swallow evaluation. Recommend FEES to devaluate potential progress in swallow.    Plan    Recommendations  1)  NPO with continuation PEG  2) OK for ~5 single ice chips per hour with RN staff to minimize xerostomia   3)  Swallowing Instructions & Precautions:   4)  Medication: Non Oral   5)  Oral Care: Q2h  6)  Target lower level responses- Y/N head nods, and imitating at the phoneme level.    Continue current treatment plan.    Discharge Recommendations: Recommend post-acute placement for additional speech therapy services prior to discharge home     Objective       11/22/22 0831   Vitals   O2 " (LPM) 2   O2 Delivery Device Mask   Pain 0 - 10 Group   Therapist Pain Assessment During Activity;Nurse Notified   Patient / Family Goals   Patient / Family Goal #1 Shook head Y for more ice chips   Goal #1 Outcome Goal not met   Short Term Goals   Short Term Goal # 1 modified 11/7:  Patient will consume PO trials of single ice chips and 1/2 tsp boluses of MTL with SLP only with no overt S/Sx of aspiration noted   Goal Outcome # 1 Progressing slower than expected   Short Term Goal # 2 Pt will demonstrate a reliable Y and N to express wants and needs x10 in a session.   Goal Outcome # 2  Progressing slower than expected   Short Term Goal # 3 11/11 Pt will maintain wakefulness for greater than 15 min with mod max stimulation.   Goal Outcome  # 3 Goal met   Education Group   Education Provided Dysphagia   Dysphagia Patient Response Patient;Acceptance;Explanation;Action Demonstration;Reinforcement Needed;No Learning Evidence;Family   Anticipated Discharge Needs   Discharge Recommendations Recommend post-acute placement for additional speech therapy services prior to discharge home   Therapy Recommendations Upon DC Dysphagia Training

## 2022-11-22 NOTE — CARE PLAN
The patient is Stable - Low risk of patient condition declining or worsening    Shift Goals  Clinical Goals: PEG feeds, maintain skin integrity  Patient Goals: khadra  Family Goals: KHADRA    Progress made toward(s) clinical / shift goals:    Problem: Knowledge Deficit - Stroke Education  Goal: Patient's knowledge of stroke and risk factors will improve  Outcome: Progressing     Problem: Dysphagia  Goal: Dysphagia will improve  Outcome: Not Progressing  Note: Tube feeds given. NPO at this time.     Problem: Skin Integrity  Goal: Skin integrity is maintained or improved  Outcome: Progressing       Patient is not progressing towards the following goals:      Problem: Dysphagia  Goal: Dysphagia will improve  Outcome: Not Progressing  Note: Tube feeds given. NPO at this time.

## 2022-11-22 NOTE — ASSESSMENT & PLAN NOTE
Status post stroke  Speech following  S/p peg  Patient passed FEES exam.  Mild oropharyngeal dysphagia as per exam.  Diet per patient preference on Comfort Care

## 2022-11-22 NOTE — CARE PLAN
Problem: Optimal Care of the Stroke Patient  Goal: Optimal emergency care for the stroke patient  Outcome: Progressing  Goal: Optimal acute care for the stroke patient  Outcome: Progressing     Problem: Knowledge Deficit - Stroke Education  Goal: Patient's knowledge of stroke and risk factors will improve  Outcome: Progressing     Problem: Psychosocial - Patient Condition  Goal: Patient's ability to verbalize feelings about condition will improve  Outcome: Progressing  Goal: Patient's ability to re-evaluate and adapt role responsibilities will improve  Outcome: Progressing     Problem: Discharge Planning - Stroke  Goal: Ensure Stroke Core Measures are met prior to discharge  Outcome: Progressing  Goal: Patient’s continuum of care needs will be met  Outcome: Progressing     Problem: Neuro Status  Goal: Neuro status will remain stable or improve  Outcome: Progressing     Problem: Hemodynamic Monitoring  Goal: Patient's hemodynamics, fluid balance and neurologic status will be stable or improve  Outcome: Progressing     Problem: Respiratory - Stroke Patient  Goal: Patient will achieve/maintain optimum respiratory rate/effort  Outcome: Progressing     Problem: Dysphagia  Goal: Dysphagia will improve  Outcome: Progressing     Problem: Risk for Aspiration  Goal: Patient's risk for aspiration will be absent or decrease  Outcome: Progressing     Problem: Urinary Elimination  Goal: Establish and maintain regular urinary output  Outcome: Progressing     Problem: Bowel Elimination  Goal: Establish and maintain regular bowel function  Outcome: Progressing     Problem: Mobility - Stroke  Goal: Patient's capacity to carry out activities will improve  Outcome: Progressing  Goal: Spasticity will be prevented or improved  Outcome: Progressing  Goal: Subluxation will be prevented or improved  Outcome: Progressing     Problem: Self Care  Goal: Patient will have the ability to perform ADLs independently or with assistance (bathe,  groom, dress, toilet and feed)  Outcome: Progressing     Problem: Knowledge Deficit - Standard  Goal: Patient and family/care givers will demonstrate understanding of plan of care, disease process/condition, diagnostic tests and medications  Outcome: Progressing     Problem: Skin Integrity  Goal: Skin integrity is maintained or improved  Outcome: Progressing     Problem: Fall Risk  Goal: Patient will remain free from falls  Outcome: Progressing     Problem: Pain - Standard  Goal: Alleviation of pain or a reduction in pain to the patient’s comfort goal  Outcome: Progressing   The patient is Stable - Low risk of patient condition declining or worsening    Shift Goals  Clinical Goals: PEG feeds  Patient Goals: DALE  Family Goals: DALE    Progress made toward(s) clinical / shift goals:  Patient in no signs of distress or discomfort, nonverbal, eager to sleep, required no further intervention    Patient is not progressing towards the following goals:

## 2022-11-22 NOTE — DISCHARGE PLANNING
Case Management Discharge Planning    Admission Date: 11/2/2022  GMLOS: 4.5  ALOS: 20    6-Clicks ADL Score: 6  6-Clicks Mobility Score: 9  PT and/or OT Eval ordered: No  Post-acute Referrals Ordered: No  Post-acute Choice Obtained: Yes  Has referral(s) been sent to post-acute provider:  Yes      Anticipated Discharge Dispo: Discharge Disposition: Discharged to home/self care (01)    DME Needed: Yes    DME Ordered: No    Action(s) Taken: Updated Provider/Nurse on Discharge Plan    Escalations Completed: Long Length of Stay Committee     Medically Clear: Yes    Next Steps:     Barriers to Discharge: Does not have insurance    Is the patient up for discharge tomorrow: No    Patient is on O2, 2 liters was not on oxygen prior to admit will attempt to wean.  Patient will need [facility]  Patient has a peg, will need peg supplies and family training

## 2022-11-23 PROBLEM — R29.898 WEAKNESS OF RIGHT UPPER EXTREMITY: Status: ACTIVE | Noted: 2022-01-01

## 2022-11-23 PROBLEM — J96.01 ACUTE HYPOXEMIC RESPIRATORY FAILURE (HCC): Status: ACTIVE | Noted: 2022-01-01

## 2022-11-23 PROBLEM — I63.232 ACUTE ISCHEMIC LEFT ICA STROKE (HCC): Status: ACTIVE | Noted: 2022-01-01

## 2022-11-23 NOTE — WOUND TEAM
[facility]  Inpatient Services   Wound and Skin Care     Admission Date: 11/2/2022     Last order of IP CONSULT TO WOUND CARE was found on 11/15/2022 from Hospital Encounter on 11/2/2022     HPI, PMH, SH: Reviewed    Past Surgical History:   Procedure Laterality Date    IL UPPER GI ENDOSCOPY,DIAGNOSIS N/A 11/15/2022    Procedure: GASTROSCOPY WITH PEG TUBE PLACEMENT;  Surgeon: James Urena M.D.;  Location: SURGERY SAME DAY [facility];  Service: Gastroenterology     Social History     Tobacco Use    Smoking status: Never     Passive exposure: Never    Smokeless tobacco: Never   Substance Use Topics    Alcohol use: Never     Chief Complaint   Patient presents with    Altered Mental Status     Pt found by family this AM with difficulty speaking, pt went to the bathroom and was later found by family in the bathroom with altered mental status not following commands, no signs of trauma, no signs of substance or ETOH on scene per EMS. Hx DM, blood sugar 215     Diagnosis: Acute CVA (cerebrovascular accident) (HCC) [I63.9]  Failure to thrive in adult [R62.7]    Unit where seen by Wound Team: S183/02     WOUND CONSULT/FOLLOW UP RELATED TO:  nasal septum consult     WOUND HISTORY:  Patient had cortrak with bridle in place.     WOUND ASSESSMENT/LDA          Wounds resolved  Vascular:    TK:   No results found.    Lab Values:    Lab Results   Component Value Date/Time    WBC 12.0 (H) 11/18/2022 10:02 AM    RBC 4.22 11/18/2022 10:02 AM    HEMOGLOBIN 12.2 11/18/2022 10:02 AM    HEMATOCRIT 38.4 11/18/2022 10:02 AM    CREACTPROT 1.25 (H) 11/21/2022 04:45 PM    HBA1C 11.4 (H) 11/03/2022 01:02 AM        Culture Results show:  No results found for this or any previous visit (from the past 720 hour(s)).    Pain Level/Medicated:  painful       INTERVENTIONS BY WOUND TEAM:   Performed standard wound care which includes appropriate positioning, dressing removal and non-selective debridement. Pictures and measurements  obtained weekly if/when required.  Preparation for Dressing removal: ELISEO  Non-selectively Debrided with:  NS and gauze.  Sharp debridement: NA  Katarzyna wound: Cleansed with NS and gauze,   Primary Dressing: ELISEO  Secondary (Outer) Dressing: ELISEO    Interdisciplinary consultation: Patient, Bedside RN     EVALUATION / RATIONALE FOR TREATMENT:  Most Recent Date:  11/23/22 nose wounds resolved  11/16/22: Patient had cortrak placed with bridle for tube feeding.  Patient went to surgery yesterday for PEG tube placement.  Patient cortrak and bridle was removed to reveal non-blanching, non-determinable area of skin breakdown on the nasal septum.  Patient is still in need of oxygen and was placed on a mask to make sure that the nasal septum is properly offloaded.      Goals: Steady decrease in wound area and depth weekly.    WOUND TEAM PLAN OF CARE ([X] for frequency of wound follow up,):   Nursing to follow dressing orders written for wound care. Contact wound team if area fails to progress, deteriorates or with any questions/concerns if something comes up before next scheduled follow up (See below as to whether wound is following and frequency of wound follow up)  Dressing changes by wound team:                   Follow up 3 times weekly:                NPWT change 3 times weekly:     Follow up 1-2 times weekly:     Follow up Bi-Monthly:           Follow up Monthly (High Risk):                        Follow up as needed:   X  Other (explain):       Anticipated discharge plans:   LTACH:        SNF/Rehab:                  Home Health Care:           Outpatient Wound Center:            Self/Family Care:        Other:

## 2022-11-23 NOTE — THERAPY
Missed Therapy     Patient Name: Jacqueline Webb  Age:  56 y.o., Sex:  female  Medical Record #: [id number]  Today's Date: 11/23/2022    Pt greeted and declined therapy today. Therapist asked multiple questions to verify accuracy of responses. Pt reported she was too tired today. Will continue to follow.    Aware of patient's lack of insurance and sister's concerns regarding discharge home. Pt not safe to ambulate without skilled caregiver/therapist assistance. Sister has established she is uncomfortable mobilizing patient. While not optimal, as patient is able to roll independently and re position herself in bed (for cleaning, linen changing) would recommend for patient's safety be bed bound and work on transfers/wheelchair mobility with home health therapies and/or paid caregivers if obtained. Acute PT to continue to follow.     11/23/22 1053   Treatment Variance   Reason For Missed Therapy Non-Medical - Patient Refused   Precautions   Precautions Fall Risk;PEG Tube;Swallow Precautions ( See Comments)   Comments non verbal, R deficits   Pain 0 - 10 Group   Therapist Pain Assessment 0;Post Activity Pain Same as Prior to Activity  (pt denied having pain)   Interdisciplinary Plan of Care Collaboration   IDT Collaboration with  Physical Therapist;Nursing   Patient Position at End of Therapy In Bed;Bed Alarm On;Call Light within Reach  (all needs met)   Collaboration Comments RN aware of attemp. Pt denied therapy today, nodded yes to that she was tired.   Session Information   Date / Session Number  11/21-8(1/3, 11/24) attempt 11/23   Supervising Physical Therapist (PTA Treatments Only)   Supervising Physical Therapist Yamile Buchanan

## 2022-11-23 NOTE — CARE PLAN
The patient is Stable - Low risk of patient condition declining or worsening    Shift Goals  Clinical Goals: tolerate tube feeds  Patient Goals: khadra  Family Goals: KHADRA    Progress made toward(s) clinical / shift goals:      Problem: Risk for Aspiration  Goal: Patient's risk for aspiration will be absent or decrease  Outcome: Progressing   NPO, peg feeds.    Patient is not progressing towards the following goals:      Problem: Dysphagia  Goal: Dysphagia will improve  Outcome: Not Progressing   NPO, PEG feeds in place. Tolerating well.

## 2022-11-23 NOTE — PROGRESS NOTES
Patient refused meal through PEG tube. Using head nods for communication, answering questions appropriately with said method. Refused to take any bites of pureed diet orally. Patient nods north south to imply yes when asked if she is ok with us attempting to feed at a later time.

## 2022-11-23 NOTE — THERAPY
Speech Language Pathology   Fiberoptic Endoscopic Evaluation of Swallow     Patient Name: Jacqueline Webb  AGE:  56 y.o., SEX:  female  Medical Record #: [id number]  Today's Date: 11/23/2022     Precautions  Precautions: Fall Risk, Swallow Precautions ( See Comments), PEG Tube  Comments: aphasic, R side deficits    Assessment    Current Method of Nutrition   PEG tube    Pertinent Information:  Affect/Behavior: Calm  Oxygen Requirements:  2L Oxymask   Feeding Tube: PEG tube  Dentition: Good  Factor(s) Affecting Performance: Impaired mental status, Impaired command following    Discussed the risks, benefits, and alternatives of the FEES procedure. Patient/family acknowledged and agreed to proceed.     Assessment  Flexible Endoscopic Evaluation of Swallowing (FEES) completed at bedside today. The endoscope was passed transnasally via right nare to evaluate the anatomy and physiology of swallowing. Pt tolerated the procedure with no apparent distress.    Anatomic Findings: Unremarkable  Vocal Fold Motion: Bilateral movement  Secretion Management: WNL  PO trials: ice chips, thin liquids, mildly thick liquids, liquidized, puree, soft & bite sized, regular solids, mixed consistency      Consistency PAS Score Timing Comments   Ice Chips 1 N/A    Thin Liquid 8 During swallow (inferred) and Post swallow Tsp PAS 5  Tsp PAS 5  Cup sip PAS 8 spilling between in the interarytenoid space   Mildly Thick Liquid 3 During swallow (inferred) Tsp PAS 1  Tsp PAS 1   Cup sip PAS 1  Straw sip PAS 3   Liquidized 2 During swallow (inferred) PAS 1 with tsp   PAS 3 with heaping tsp   Puree 1 N/A    Soft & Bite Sized 1 N/A    Regular 1 N/A    Mixed Consistency 1 N/A      Penetration-Aspiration Scale (PAS)  1     No contrast enters airway  2     Contrast enters the airway, remains above the vocal folds, and is ejected from the airway (not seen in the airway at the end of the swallow).  3     Contrast enters the airway, remains above the vocal folds,  and is not ejected from the airway (is seen in the airway after the swallow).  4     Contrast enters the airway, contacts the vocal folds, and is ejected from the airway.  5     Contrast enters the airway, contacts the vocal folds, and is not ejected from the airway  6     Contrast enters the airway, crosses the plane of the vocal folds, and is ejected from the airway.  7     Contrast enters the airway, crosses the plane of the vocal folds, and is not ejected from the airway despite effort.  8     Contrast enters the airway, crosses the plane of the vocal folds, is not ejected from the airway and there is no response to aspiration.    Oral phase:    Patient with appropriate oral acceptance and containment. Prolonged bolus holding with solid textures, piecemeal deglutition and poor bolus control with thin liquids resulting in posterior spillage to the pyriform sinus before the swallow.     Pharyngeal phase:    Patient presents with trace vallecular and pyriform sinus residue across all consistencies. Aspiration of thin liquids resulted during the swallow related to premature spillage into the pyriforms with thin liquids eventually spilling over into the interarytenoid space. Suspect reduced epiglottic inversion, impaired tongue base retraction and impaired pharyngeal constriction. Patient has impaired sensation with SILENT ASPIRATION of thin liquids.     Clinical Impressions  The pt presents with a mild oropharyngeal dysphagia, likely acute related to CVA. She is appropriate to initiate a PO diet, but would benefit from a modified diet given her prolonged bolus holds suspect 2/2 impaired mentation/following directives. Will continue to follow up with patient, the patient would benefit from a diagnostic prior to upgrading to thin liquids, but may be able to upgrade solid consistencies as she shows improvement.      Recommendations  Pureed textures and mildly thick liquids NO THINS SILENT ASPIRATION.   2.  Swallowing  Instructions & Precautions:   Supervision: 1:1 feeding with constant supervision  Positioning: Fully upright and midline during oral intake  Medication: Crush with applesauce or puree, as appropriate, Non Oral , As tolerated  Strategies: None  Oral Care: Q6h    Plan    Recommend Speech Therapy 5 times per week until therapy goals are met for the following treatments:  Dysphagia Training.    Discharge Recommendations: Recommend post-acute placement for additional speech therapy services prior to discharge home    Objective       11/23/22 0912   Vitals   O2 (LPM) 2   O2 Delivery Device Oxymask   Pain 0 - 10 Group   Therapist Pain Assessment Post Activity Pain Same as Prior to Activity;Nurse Notified;0   Evaluation Recommendations   Diet / Liquid Recommendation Mildly Thick (2) - (Nectar Thick);Puree (4)   Medication Administration  Via Gastric Tube   Patient / Family Goals   Patient / Family Goal #1 Shook head Y for more ice chips   Goal #1 Outcome Goal not met   Short Term Goals   Short Term Goal # 1 modified 11/7:  Patient will consume PO trials of single ice chips and 1/2 tsp boluses of MTL with SLP only with no overt S/Sx of aspiration noted   Goal Outcome # 1 Goal met, new goal added   Short Term Goal # 1 B  Pt will consume PU4/MT2 diet without any overt s/sx of aspiration   Short Term Goal # 2 Pt will demonstrate a reliable Y and N to express wants and needs x10 in a session.   Goal Outcome # 2  Progressing slower than expected   Short Term Goal # 3 11/11 Pt will maintain wakefulness for greater than 15 min with mod max stimulation.   Goal Outcome  # 3 Goal met   Education Group   Dysphagia Patient Response Patient;Acceptance;Explanation;Action Demonstration;Reinforcement Needed;No Learning Evidence;Family

## 2022-11-24 PROBLEM — I95.9 HYPOTENSIVE EPISODE: Status: ACTIVE | Noted: 2022-01-01

## 2022-11-24 NOTE — PROGRESS NOTES
Hospital Medicine Daily Progress Note    Date of Service  11/23/2022    Chief Complaint  Jacqueline Webb is a 56 y.o. female admitted 11/2/2022 with altered mental status    Hospital Course  Jacqueline Webb is a 56 y.o. female who presented 11/2/2022 with a history of type 2 diabetes, high cholesterol, vision loss who presented with altered mental status.  ED patient was found to have a left-sided gaze preference, NIH 25.  Subsequent MRI noted with left frontal insular temporal infarct.  Evaluated by neurology recommended continue aspirin, Lipitor and follow-up with outpatient with [facility].  PT/OT recommending postacute placement but patient has no insurance or payor source. Case management working with family to arrange discharge to home with family assistance.       Interval Problem Update  11/23: No significant events overnight.  Patient remains nonverbal.  She remains on 2 LPM oxygen via nasal cannula.  Reported respiratory treatments as well as PEP therapy in order to wean off her oxygen.  NT proBNP is mildly elevated at 675.  On starting furosemide 20 mg by mouth daily.  Patient can be discharged to home if she is able to wean off of oxygen.  Blood glucose have been stable glargine insulin 10 units daily.  Patient continues on a puréed diet.  Also bolus feeds through the PEG tube.      I have discussed this patient's plan of care and discharge plan at IDT rounds today with Case Management, Nursing, Nursing leadership, and other members of the IDT team.    Consultants/Specialty  neurology    Code Status  DNAR/DNI    Disposition  Patient is medically cleared for discharge.   Anticipate discharge to to home with organized home healthcare and close outpatient follow-up.  I have placed the appropriate orders for post-discharge needs.    Review of Systems  Review of Systems   Unable to perform ROS: Patient nonverbal   Musculoskeletal:  Negative for joint pain and myalgias.   Neurological:  Negative for  headaches.      Physical Exam  Temp:  [36.1 °C (97 °F)-36.7 °C (98.1 °F)] 36.1 °C (97 °F)  Pulse:  [68-82] 68  Resp:  [14-18] 16  BP: ()/(54-68) 106/54  SpO2:  [93 %-96 %] 94 %    Physical Exam  Vitals and nursing note reviewed.   Constitutional:       General: She is not in acute distress.     Appearance: She is ill-appearing.   HENT:      Head: Normocephalic and atraumatic.      Nose: Nose normal. No rhinorrhea.      Mouth/Throat:      Pharynx: No oropharyngeal exudate or posterior oropharyngeal erythema.   Eyes:      General: No scleral icterus.        Right eye: No discharge.         Left eye: No discharge.   Cardiovascular:      Rate and Rhythm: Normal rate and regular rhythm.      Heart sounds: Normal heart sounds. No murmur heard.    No friction rub. No gallop.   Pulmonary:      Effort: Pulmonary effort is normal. No respiratory distress.      Breath sounds: Normal breath sounds. No stridor. No wheezing, rhonchi or rales.   Chest:      Chest wall: No tenderness.   Abdominal:      General: Bowel sounds are normal. There is no distension.      Palpations: Abdomen is soft. There is no mass.      Tenderness: There is no abdominal tenderness. There is no rebound.      Comments: PEG   Musculoskeletal:         General: No swelling or tenderness.      Cervical back: Neck supple. No rigidity.   Skin:     General: Skin is warm and dry.      Coloration: Skin is pale. Skin is not cyanotic or jaundiced.      Nails: There is no clubbing.   Neurological:      Mental Status: She is alert.      Cranial Nerves: No cranial nerve deficit.      Motor: Weakness present.      Comments: Expressive aphasia  Right hemiparesis 1/5    4/5 weakness in the bilateral lower extremities.   Psychiatric:         Mood and Affect: Mood normal.         Behavior: Behavior normal.       Fluids    Intake/Output Summary (Last 24 hours) at 11/23/2022 8032  Last data filed at 11/23/2022 1800  Gross per 24 hour   Intake 1250 ml   Output no  documentation   Net 1250 ml         Laboratory                                Imaging  CT-CHEST (THORAX) WITH   Final Result      1.  No evidence of pulmonary nodule. Questioned pulmonary nodule on abdominal film due to callus formation from subacute/chronic right anterior fourth rib fracture.      2.  Porcelain gallbladder.      Fleischner Society pulmonary nodule recommendations:   Not Applicable         DX-ABDOMEN FOR TUBE PLACEMENT   Final Result      Enteric tube with catheter tip in the left upper quadrant consistent with intragastric location.      MR-BRAIN-W/O   Final Result         Acute infarct in the left frontal, insular and left medial temporal region. Small acute infarct also noted in the left internal capsule anterior limb and adjacent basal ganglia.      Occlusion of the left distal cervical and petrous ICA.      Remote right parietal infarct.      Age-related volume loss and chronic microvascular ischemic changes.      DX-ABDOMEN FOR TUBE PLACEMENT   Final Result         1.  Nonspecific bowel gas pattern in the upper abdomen.   2.  Nasogastric tube tip terminates overlying the expected location of the pylorus or first duodenal segment.   3.  Atherosclerosis      DX-ABDOMEN FOR TUBE PLACEMENT   Final Result         1.  Nonspecific bowel gas pattern in the upper abdomen.   2.  Nasogastric tube with side-port at the gastroesophageal junction, recommend advancement.   3.  Right midlung nodular density, recommend follow-up CT chest for further characterization.   4.  Atherosclerosis      These findings were discussed with the patient's clinician, Dr. Puente, on 11/4/2022 7:25 AM.      EC-ECHOCARDIOGRAM COMPLETE W/O CONT   Final Result      DX-CHEST-PORTABLE (1 VIEW)   Final Result      No acute cardiac or pulmonary abnormalities are identified.      CT-CTA NECK WITH & W/O-POST PROCESSING   Final Result      1.  Occluded LEFT extracranial internal carotid artery. This could be due to thromboembolic disease  or dissection.   2.  Estimated 50-75% stenosis of the proximal RIGHT internal carotid artery   3.  Thyroid nodules      CT-CEREBRAL PERFUSION ANALYSIS   Final Result      1.  Moderate sized completed area of infarction noted in the left frontal parasylvian region.      CT-CTA HEAD WITH & W/O-POST PROCESS   Final Result      1.  Occlusion of the imaged extracranial LEFT internal carotid artery through the cavernous segment with reconstitution of the supraclinoid segment   2.  LEFT M3 branch occlusion            Findings were communicated with and acknowledged by Dr. Sultana via [employer] on 11/2/2022 2:34 PM.      CT-HEAD W/O   Final Result      1.  Moderate sized acute area of infarction involving the left frontal parasylvian region.      2.  Smaller encephalomalacia thinning to the cortex in the right parietal-occipital region.      3.  Mild age-related cerebral atrophy.                Assessment/Plan  * Acute CVA (cerebrovascular accident) (HCC)- (present on admission)  Assessment & Plan  MRI noted with left frontal insular temporal infarct    Continue aspirin atorvastatin    Evaluated by neurology with recommendation for Zio patch on discharge    PT/OT recommending postacute placement no accepting SNF discussed with case management who will follow up with family about applying for Medicaid - plan to return to home with family in the meantime - case management arranging some follow up  S/p peg     Patient does not have insurance and will be difficult placement to skilled nursing facility.    Acute ischemic left ICA stroke (HCC)- (present on admission)  Assessment & Plan  As per CT angiogram.  Because of patient's stroke.    Atorvastatin and aspirin.    Weakness of right upper extremity- (present on admission)  Assessment & Plan  Secondary to stroke.    Physical therapy and Occupational Therapy as tolerated    Acute hypoxemic respiratory failure (HCC)- (present on admission)  Assessment & Plan  11/23: Patient  continues to require 2 LPM oxygen via nasal cannula.  Have started on PEP therapy, DuoNeb treatments, in order to wean off patient's oxygen.  Have started the patient on low-dose furosemide 20 mg daily as her NT BNP is mildly elevated.    Dysphagia  Assessment & Plan  Status post stroke  Speech following  S/p peg  Fees today    Thyroid nodule  Assessment & Plan  Noted on CTA of neck will need outpatient follow-up  TSH normal    Carotid stenosis  Assessment & Plan  Aspirin statin  Outpatient monitoring and follow-up    Vision loss  Assessment & Plan  Will need outpatient ophthalmology evaluation    HLD (hyperlipidemia)  Assessment & Plan  Continue atorvastatin    Type 2 diabetes mellitus (HCC)  Assessment & Plan   Hemoglobin A1c 11.4  Blood glucose currently controlled    Continue glargine insulin 10 units evenings  Continue sliding-scale insulin  Hypoglycemia protocol         VTE prophylaxis: SCDs/TEDs and enoxaparin ppx    I have performed a physical exam and reviewed and updated ROS and Plan today (11/23/2022). In review of yesterday's note (11/22/2022), there are no changes except as documented above.

## 2022-11-24 NOTE — PROGRESS NOTES
Patient had nosebleed x1. Morning labs taken early. CHEIKH Mcgovern aware of results, OK to wait for lasix until K resulted.

## 2022-11-24 NOTE — CARE PLAN
The patient is Watcher - Medium risk of patient condition declining or worsening    Shift Goals  Clinical Goals: tube feeds, monitor sugars  Patient Goals: rest  Family Goals: khadra    Progress made toward(s) clinical / shift goals:        Problem: Optimal Care of the Stroke Patient  Goal: Optimal acute care for the stroke patient  Outcome: Progressing     Problem: Knowledge Deficit - Stroke Education  Goal: Patient's knowledge of stroke and risk factors will improve  Outcome: Progressing     Problem: Psychosocial - Patient Condition  Goal: Patient's ability to verbalize feelings about condition will improve  Outcome: Progressing     Problem: Neuro Status  Goal: Neuro status will remain stable or improve  Outcome: Progressing     Problem: Hemodynamic Monitoring  Goal: Patient's hemodynamics, fluid balance and neurologic status will be stable or improve  Outcome: Progressing     Problem: Respiratory - Stroke Patient  Goal: Patient will achieve/maintain optimum respiratory rate/effort  Outcome: Progressing     Problem: Skin Integrity  Goal: Skin integrity is maintained or improved  Outcome: Progressing     Problem: Fall Risk  Goal: Patient will remain free from falls  Outcome: Progressing     Problem: Pain - Standard  Goal: Alleviation of pain or a reduction in pain to the patient’s comfort goal  Outcome: Progressing       Patient is not progressing towards the following goals:

## 2022-11-24 NOTE — FLOWSHEET NOTE
11/24/22 1046   Bronchodilator Protocol   Is this an exacerbation of COPD/Asthma? No   Bronchodilator Indications History / Diagnosis   Breath Sounds Criteria 1    Benefit Criteria 1    Pulse Criteria 0    Respiratory Rate Criteria 0    S.O.B. Criteria 0    Criteria Total 2   Point Values 0-3 - PRN   Bronchodilator Goals/Outcome Patient at Stable Baseline   Hyperinflation Protocol   Hyperinflation Protocol Indications Atelectasis Documented by Chest X-Ray   Hyperinflation Protocol Goals/Outcome Improvement in Repeat CXR     Patient unable to follow commands and perform PEP/IS  Dc order at this time

## 2022-11-24 NOTE — DIETARY
Nutrition support weekly update:  Day 21 of admit.  Jacqueline Webb is a 56 y.o. female with admitting DX of acute CVA.  Tube feeding initiated on 11/04. Current TF via (route)  PEG is Diabetisource AC - 4 cartons per day.  Diabetisource AC goal rate 4 (Cartons) per day - providing 1200 kcals, 60 grams protein, 820 mL free water, 100 (g of CHO).      Assessment:  Weight 61.5 kg (11/19)      Evaluation:   Diet started per SLP this morning, pureed with mildly thick liquids and 1:1 supervision.   Last BM 11/22  Per RN, patient refusing meals bolus feeds this morning. Suspect that majority of nutrition will continue to come from PEG.   Labs and meds reviewed. Last chem panel 11/18.  Current feeding remain appropriate.       Malnutrition risk: does not appear to meet criteria at this time.     Recommendations/Plan:  Continue TF formula though will modify slightly for new addition of diet.   Goal to meet 100% of estimated nutrition needs via PEG:  Diabetisource AC - 4 cartons per day.   Offer PO diet first. Depending on PO intake at each meal , bolus the following amounts:  PO intake < or equal to 50% of meal: bolus 1 carton   PO intake 50-75% of meal: bolus 1/2 carton  PO intake >75% of meal: no meal time bolus.   At this time, recommend HS bolus of one full container regardless of PO intake at other meals.    Will continue to monitor and adjust recommendations as needed.     RD following

## 2022-11-25 PROBLEM — E11.65 TYPE 2 DIABETES MELLITUS WITH HYPERGLYCEMIA, WITHOUT LONG-TERM CURRENT USE OF INSULIN (HCC): Status: ACTIVE | Noted: 2022-01-01

## 2022-11-25 PROBLEM — D72.829 LEUKOCYTOSIS: Status: ACTIVE | Noted: 2022-01-01

## 2022-11-25 PROBLEM — R13.12 OROPHARYNGEAL DYSPHAGIA: Status: ACTIVE | Noted: 2022-01-01

## 2022-11-25 NOTE — PROGRESS NOTES
Patient refused PT/OT. When explained the cons of being in bed for an extended period of time the patient continued to refuse to work with PT/OT. When asked if she wants to get better she shook her head no.

## 2022-11-25 NOTE — PROGRESS NOTES
Hospital Medicine Daily Progress Note    Date of Service  11/25/2022    Chief Complaint  Jacqueline Webb is a 56 y.o. female admitted 11/2/2022 with altered mental status    Hospital Course  Jacqueline Webb is a 56 y.o. female who presented 11/2/2022 with a history of type 2 diabetes, high cholesterol, vision loss who presented with altered mental status.  ED patient was found to have a left-sided gaze preference, NIH 25.  Subsequent MRI noted with left frontal insular temporal infarct.  Evaluated by neurology recommended continue aspirin, Lipitor and follow-up with [facility].  PT/OT recommending postacute placement but patient has no insurance or payor source. Case management working with family to arrange discharge to home with family assistance.       Interval Problem Update  11/23: No significant events overnight.  Patient remains nonverbal.  She remains on 2 LPM oxygen via nasal cannula.  Reported respiratory treatments as well as PEP therapy in order to wean off her oxygen.  NT proBNP is mildly elevated at 675.  On starting furosemide 20 mg by mouth daily.  Patient can be discharged to home if she is able to wean off of oxygen.  Blood glucose have been stable glargine insulin 10 units daily.  Patient continues on a puréed diet.  Also bolus feeds through the PEG tube.    11/24: Patient becoming more lethargic today.  Blood pressures dropping to 77/46 today.  Furosemide discontinued.  Given LR 1 L bolus.  Started on midodrine 10 mg 3 times a day.  White count mildly increased at 14.3.  Procalcitonin yesterday was 0.1.  No signs of active infection.  Repeat procalcitonin level hospitalist urinalysis has been ordered.  Currently on 1 LPM oxygen mask.    11/25: Patient is more awake this morning.  Blood pressures remaining stable in the 100s/70s on midodrine 10 mg 3 times a day as IV fluids.  I have discontinued patient's IV fluids as well as decreased her midodrine to 5 mg 3 times a day.   White count is still elevated at 12.7.  Procalcitonin yesterday was 0.1.  There is no signs of infection but there is a concern about possibility of UTI.  We are obtaining urinalysis through straight catheterization.  She is currently on 1 LPM oxygen via oxygen mask.  Blood glucose in the 130s to 170s.  Glargine insulin increased to 11 units from 10 units.  Patient refusing to work with PT and OT today.      I have discussed this patient's plan of care and discharge plan with bedside nurse.    Consultants/Specialty  neurology    Code Status  DNAR/DNI    Disposition  Patient is medically cleared for discharge.   Anticipate discharge to to home with organized home healthcare and close outpatient follow-up.  I have placed the appropriate orders for post-discharge needs.    Review of Systems  Review of Systems   Unable to perform ROS: Patient nonverbal      Physical Exam  Temp:  [36.2 °C (97.1 °F)-36.7 °C (98 °F)] 36.2 °C (97.2 °F)  Pulse:  [80-94] 86  Resp:  [16-18] 18  BP: ()/(46-76) 108/76  SpO2:  [93 %-97 %] 96 %    Physical Exam  Vitals and nursing note reviewed.   Constitutional:       General: She is not in acute distress.     Appearance: She is ill-appearing.   HENT:      Head: Normocephalic and atraumatic.      Nose: No rhinorrhea.      Mouth/Throat:      Pharynx: No oropharyngeal exudate or posterior oropharyngeal erythema.   Eyes:      General: No scleral icterus.        Right eye: No discharge.         Left eye: No discharge.      Conjunctiva/sclera: Conjunctivae normal.   Cardiovascular:      Rate and Rhythm: Normal rate and regular rhythm.      Heart sounds: Normal heart sounds. No murmur heard.    No friction rub. No gallop.   Pulmonary:      Effort: Pulmonary effort is normal. No respiratory distress.      Breath sounds: Normal breath sounds. No stridor. No wheezing, rhonchi or rales.   Chest:      Chest wall: No tenderness.   Abdominal:      General: Bowel sounds are normal. There is no  distension.      Palpations: Abdomen is soft. There is no mass.      Tenderness: There is no abdominal tenderness. There is no rebound.      Comments: PEG   Musculoskeletal:         General: No swelling or tenderness.      Cervical back: Neck supple. No rigidity.   Skin:     General: Skin is warm and dry.      Coloration: Skin is pale. Skin is not cyanotic or jaundiced.      Nails: There is no clubbing.   Neurological:      Mental Status: She is alert.      Cranial Nerves: No cranial nerve deficit.      Motor: Weakness present.      Comments: Profound expressive aphasia.  Opening her eyes spontaneously.  Moving her bilateral lower extremities as well as left upper extremity with 3-5/5 strength.  1/5 strength in the right upper extremity.   Psychiatric:         Mood and Affect: Mood normal.         Behavior: Behavior normal.       Fluids    Intake/Output Summary (Last 24 hours) at 11/25/2022 0958  Last data filed at 11/25/2022 0800  Gross per 24 hour   Intake 120 ml   Output no documentation   Net 120 ml         Laboratory  Recent Labs     11/23/22 2358 11/25/22  0638   WBC 14.3* 12.7*   RBC 4.46 4.34   HEMOGLOBIN 12.9 12.6   HEMATOCRIT 39.1 38.0   MCV 87.7 87.6   MCH 28.9 29.0   MCHC 33.0* 33.2*   RDW 37.1 37.1   PLATELETCT 388 387   MPV 9.2 9.2         Recent Labs     11/23/22 2358 11/25/22  0638   SODIUM 135 134*   POTASSIUM 4.1 4.1   CHLORIDE 95* 97   CO2 31 31   GLUCOSE 181* 224*   BUN 23* 16   CREATININE 0.53 0.31*   CALCIUM 9.4 9.3                           Imaging  DX-CHEST-PORTABLE (1 VIEW)   Final Result      Decreasing lung volumes and new perihilar atelectasis favored over consolidation      CT-CHEST (THORAX) WITH   Final Result      1.  No evidence of pulmonary nodule. Questioned pulmonary nodule on abdominal film due to callus formation from subacute/chronic right anterior fourth rib fracture.      2.  Porcelain gallbladder.      Fleischner Society pulmonary nodule recommendations:   Not Applicable          DX-ABDOMEN FOR TUBE PLACEMENT   Final Result      Enteric tube with catheter tip in the left upper quadrant consistent with intragastric location.      MR-BRAIN-W/O   Final Result         Acute infarct in the left frontal, insular and left medial temporal region. Small acute infarct also noted in the left internal capsule anterior limb and adjacent basal ganglia.      Occlusion of the left distal cervical and petrous ICA.      Remote right parietal infarct.      Age-related volume loss and chronic microvascular ischemic changes.      DX-ABDOMEN FOR TUBE PLACEMENT   Final Result         1.  Nonspecific bowel gas pattern in the upper abdomen.   2.  Nasogastric tube tip terminates overlying the expected location of the pylorus or first duodenal segment.   3.  Atherosclerosis      DX-ABDOMEN FOR TUBE PLACEMENT   Final Result         1.  Nonspecific bowel gas pattern in the upper abdomen.   2.  Nasogastric tube with side-port at the gastroesophageal junction, recommend advancement.   3.  Right midlung nodular density, recommend follow-up CT chest for further characterization.   4.  Atherosclerosis      These findings were discussed with the patient's clinician, Dr. Puente, on 11/4/2022 7:25 AM.      EC-ECHOCARDIOGRAM COMPLETE W/O CONT   Final Result      DX-CHEST-PORTABLE (1 VIEW)   Final Result      No acute cardiac or pulmonary abnormalities are identified.      CT-CTA NECK WITH & W/O-POST PROCESSING   Final Result      1.  Occluded LEFT extracranial internal carotid artery. This could be due to thromboembolic disease or dissection.   2.  Estimated 50-75% stenosis of the proximal RIGHT internal carotid artery   3.  Thyroid nodules      CT-CEREBRAL PERFUSION ANALYSIS   Final Result      1.  Moderate sized completed area of infarction noted in the left frontal parasylvian region.      CT-CTA HEAD WITH & W/O-POST PROCESS   Final Result      1.  Occlusion of the imaged extracranial LEFT internal carotid artery through  the cavernous segment with reconstitution of the supraclinoid segment   2.  LEFT M3 branch occlusion            Findings were communicated with and acknowledged by Dr. Sultana via [employer] on 11/2/2022 2:34 PM.      CT-HEAD W/O   Final Result      1.  Moderate sized acute area of infarction involving the left frontal parasylvian region.      2.  Smaller encephalomalacia thinning to the cortex in the right parietal-occipital region.      3.  Mild age-related cerebral atrophy.              Personally reviewed the patient's chest x-ray performed on 11/24/2022.  Per my read mild right lower lobe interstitial infiltrates.  Sharp costophrenic angles bilaterally.  Contracted lung volumes.      Assessment/Plan  * Acute CVA (cerebrovascular accident) (HCC)- (present on admission)  Assessment & Plan  MRI noted with left frontal insular temporal infarct    Continue aspirin atorvastatin    Evaluated by neurology with recommendation for Zio patch on discharge    PT/OT recommending postacute placement no accepting SNF discussed with case management who will follow up with family about applying for Medicaid - plan to return to home with family in the meantime - case management arranging some follow up  S/p peg     Patient does not have insurance and will be difficult placement to skilled nursing facility.    Leukocytosis- (present on admission)  Assessment & Plan  11/25: Persistent leukocytosis of 12.7.  No fevers.  Procalcitonin yesterday was negative.  Obtaining urinalysis via straight catheterization.  Holding off antibiotics at this time.    Hypotensive episode- (present on admission)  Assessment & Plan  11/24: Bood pressures dropping to 77/46 today.  Furosemide and lisinopril discontinued.  Given LR 1 L bolus.  Started on midodrine 10 mg 3 times a day.  Started on maintenance LR at 75 mils per hour.  11/25: Blood pressures remaining stable in the 100s/70s on midodrine 10 mg 3 times a day as IV fluids.  I have discontinued  patient's IV fluids as well as decreased her midodrine to 5 mg 3 times a day.     Acute hypoxemic respiratory failure (HCC)- (present on admission)  Assessment & Plan  11/23: Patient continues to require 2 LPM oxygen via nasal cannula.  Have started on PEP therapy, DuoNeb treatments, in order to wean off patient's oxygen.  Have started the patient on low-dose furosemide 20 mg daily as her NT BNP is mildly elevated.  11/24: Improving to 1 LPM oxygen via oxygen mask.  Becoming hypotensive.  Furosemide discontinued and started 1 L LR bolus.  11/25: Continues on 1 LPM oxygen via oxygen mask.  Blood pressure is improving.  Trying to wean off oxygen.    Oropharyngeal dysphagia  Assessment & Plan  Status post stroke  Speech following  S/p peg  Patient passed FEES exam.  Mild oropharyngeal dysphagia as per exam.    11/25: Continue 1:1 feeding with modified diet and bolus tube feeds.    Acute ischemic left ICA stroke (HCC)- (present on admission)  Assessment & Plan  As per CT angiogram.  Because of patient's stroke.    Atorvastatin and aspirin.    Weakness of right upper extremity- (present on admission)  Assessment & Plan  Secondary to stroke.    Physical therapy and Occupational Therapy as tolerated    Failure to thrive in adult- (present on admission)  Assessment & Plan  Discharge disposition is difficult.  Patient does not have insurance.    Type 2 diabetes mellitus with hyperglycemia, without long-term current use of insulin ([facility])- (present on admission)  Assessment & Plan   Hemoglobin A1c 11.4  Blood glucose currently controlled    11/25:  Blood glucose in the 130s to 170s.  Glargine insulin increased to 11 units from 10 units.  Continue sliding-scale insulin  Hypoglycemia protocol      Thyroid nodule  Assessment & Plan  Noted on CTA of neck will need outpatient follow-up  TSH normal    Carotid stenosis  Assessment & Plan  Aspirin statin  Outpatient monitoring and follow-up    Vision loss  Assessment & Plan  Will need  outpatient ophthalmology evaluation    HLD (hyperlipidemia)  Assessment & Plan  Continue atorvastatin       VTE prophylaxis: SCDs/TEDs and enoxaparin ppx    I have performed a physical exam and reviewed and updated ROS and Plan today (11/25/2022). In review of yesterday's note (11/24/2022), there are no changes except as documented above.

## 2022-11-25 NOTE — CARE PLAN
The patient is Stable - Low risk of patient condition declining or worsening    Shift Goals  Clinical Goals: encourage po intake, maintain skin integrity  Patient Goals: rest  Family Goals: khadra    Progress made toward(s) clinical / shift goals:    Problem: Optimal Care of the Stroke Patient  Goal: Optimal emergency care for the stroke patient  Description: Target End Date:  End of day 1    Time of Onset    1.  Time of last known well obtained  2.  Patient and family/caregiver verbalize understanding of diagnosis, medications and testing  3.  NIHSS performed and documented, including date and time, for ischemic stroke patients prior to any acute recanalization therapy (thrombolytics or mechanical) or within 12 hours of arrival if no intervention is warranted  4.  Consults and referrals placed to appropriate departments    Medications Administration as Ordered:    1.  Implement appropriate reversal agents for INR greater than 1.5  2.  Pre-alteplase administration of antihypertensives for SBP >185 DBP >110  3.  Post-alteplase administration of antihypertensives for SBP >185, DBP >105  4.  Thrombolytic Therapy for qualifying ischemic stroke patients who arrive within 4.5 hours of time of Last Known Well. Thrombolytic therapy administered within 30 minutes or a documented reason for delay  Outcome: Progressing  Goal: Optimal acute care for the stroke patient  Description: Target End Date:  1 to 3 days    - Vital signs and neuro checks performed and documented per order  - NIHSS completed and documented per order  - Continuous telemetry monitoring for 72 hours or until discontinued by provider  - Head CT without contrast obtained  - Consideration of MRI/MRA  - MRI screening form completed in worklist if MRI ordered  - Echocardiogram with Bubble Study ordered/completed with consideration of VENESSA  - Carotid Doppler ordered/completed (Not required if CTA of neck completed in ED)  - Lipid Panel obtained within 48 hours of  admission  - PT, PTT, INR obtained per Anticoagulation orders (if applicable)  - Antithrombotic therapy by end of hospital day 2 for ischemic stroke. Provider must document reason if contraindicated.  - Venous Thromboembolism (VTE) Prophylaxis by end of hospital day 2 for ischemic and hemorrhagic stroke. Provider must document reason if contraindicated  - Dysphagia screen completed and documented prior to any PO intake. Patient to remain NPO until Speech Therapy evaluation if thrombolytic or thrombectomy performed  - Rehabilitation assessment including PT/OT/SLP evaluations for referral to Physical Medicine and Rehabilitation services. If none needed, provider needs to document reason  - Neurology consult placed  - Consideration of cardiology consult for cryptogenic strokes  Outcome: Progressing       Patient is not progressing towards the following goals:

## 2022-11-25 NOTE — PROGRESS NOTES
Hospital Medicine Daily Progress Note    Date of Service  11/24/2022    Chief Complaint  Jacqueline Webb is a 56 y.o. female admitted 11/2/2022 with altered mental status    Hospital Course  Jacqueline Webb is a 56 y.o. female who presented 11/2/2022 with a history of type 2 diabetes, high cholesterol, vision loss who presented with altered mental status.  ED patient was found to have a left-sided gaze preference, NIH 25.  Subsequent MRI noted with left frontal insular temporal infarct.  Evaluated by neurology recommended continue aspirin, Lipitor and follow-up with outpatient [facility].  PT/OT recommending postacute placement but patient has no insurance or payor source. Case management working with family to arrange discharge to home with family assistance.       Interval Problem Update  11/23: No significant events overnight.  Patient remains nonverbal.  She remains on 2 LPM oxygen via nasal cannula.  Reported respiratory treatments as well as PEP therapy in order to wean off her oxygen.  NT proBNP is mildly elevated at 675.  On starting furosemide 20 mg by mouth daily.  Patient can be discharged to home if she is able to wean off of oxygen.  Blood glucose have been stable glargine insulin 10 units daily.  Patient continues on a puréed diet.  Also bolus feeds through the PEG tube.    11/24: Patient becoming more lethargic today.  Blood pressures dropping to 77/46 today.  Furosemide discontinued.  Given LR 1 L bolus.  Started on midodrine 10 mg 3 times a day.  White count mildly increased at 14.3.  Procalcitonin yesterday was 0.1.  No signs of active infection.  Repeat procalcitonin level hospitalist urinalysis has been ordered.  Currently on 1 LPM oxygen mask.      I have discussed this patient's plan of care and discharge plan at IDT rounds today with Case Management, Nursing, Nursing leadership, and other members of the IDT team.    Consultants/Specialty  neurology    Code  Status  DNAR/DNI    Disposition  Patient is medically cleared for discharge.   Anticipate discharge to to home with organized home healthcare and close outpatient follow-up.  I have placed the appropriate orders for post-discharge needs.    Review of Systems  Review of Systems   Unable to perform ROS: Mental status change      Physical Exam  Temp:  [36.1 °C (97 °F)-36.7 °C (98.1 °F)] 36.7 °C (98 °F)  Pulse:  [68-88] 88  Resp:  [16] 16  BP: ()/(43-54) 77/46  SpO2:  [93 %-96 %] 96 %    Physical Exam  Vitals and nursing note reviewed.   Constitutional:       General: She is not in acute distress.     Appearance: She is ill-appearing.      Comments: Sleeping but arousable to pain.   HENT:      Head: Normocephalic and atraumatic.      Nose: Nose normal. No rhinorrhea.      Mouth/Throat:      Pharynx: No oropharyngeal exudate or posterior oropharyngeal erythema.   Eyes:      General: No scleral icterus.        Right eye: No discharge.         Left eye: No discharge.   Cardiovascular:      Rate and Rhythm: Normal rate and regular rhythm.      Heart sounds: Normal heart sounds. No murmur heard.    No friction rub. No gallop.   Pulmonary:      Effort: Pulmonary effort is normal. No respiratory distress.      Breath sounds: Normal breath sounds. No stridor. No wheezing, rhonchi or rales.   Chest:      Chest wall: No tenderness.   Abdominal:      General: Bowel sounds are normal. There is no distension.      Palpations: Abdomen is soft. There is no mass.      Tenderness: There is no abdominal tenderness. There is no rebound.      Comments: PEG   Musculoskeletal:         General: No swelling or tenderness.      Cervical back: Neck supple. No rigidity.   Skin:     General: Skin is warm and dry.      Coloration: Skin is pale. Skin is not cyanotic or jaundiced.      Nails: There is no clubbing.   Neurological:      Mental Status: She is lethargic.      Cranial Nerves: No cranial nerve deficit.      Motor: Weakness present.       Comments: Expressive aphasia  She is follow commands in bilateral lower extremities and wiggles her toes.  She has stable right-sided weakness 1/5 strength.  She is squeezing my fingers with her left hand.   Psychiatric:         Mood and Affect: Mood normal.         Behavior: Behavior normal.       Fluids    Intake/Output Summary (Last 24 hours) at 11/24/2022 1655  Last data filed at 11/24/2022 1230  Gross per 24 hour   Intake 1350 ml   Output no documentation   Net 1350 ml         Laboratory  Recent Labs     11/23/22  2358   WBC 14.3*   RBC 4.46   HEMOGLOBIN 12.9   HEMATOCRIT 39.1   MCV 87.7   MCH 28.9   MCHC 33.0*   RDW 37.1   PLATELETCT 388   MPV 9.2       Recent Labs     11/23/22  2358   SODIUM 135   POTASSIUM 4.1   CHLORIDE 95*   CO2 31   GLUCOSE 181*   BUN 23*   CREATININE 0.53   CALCIUM 9.4                         Imaging  DX-CHEST-PORTABLE (1 VIEW)   Final Result      Decreasing lung volumes and new perihilar atelectasis favored over consolidation      CT-CHEST (THORAX) WITH   Final Result      1.  No evidence of pulmonary nodule. Questioned pulmonary nodule on abdominal film due to callus formation from subacute/chronic right anterior fourth rib fracture.      2.  Porcelain gallbladder.      Fleischner Society pulmonary nodule recommendations:   Not Applicable         DX-ABDOMEN FOR TUBE PLACEMENT   Final Result      Enteric tube with catheter tip in the left upper quadrant consistent with intragastric location.      MR-BRAIN-W/O   Final Result         Acute infarct in the left frontal, insular and left medial temporal region. Small acute infarct also noted in the left internal capsule anterior limb and adjacent basal ganglia.      Occlusion of the left distal cervical and petrous ICA.      Remote right parietal infarct.      Age-related volume loss and chronic microvascular ischemic changes.      DX-ABDOMEN FOR TUBE PLACEMENT   Final Result         1.  Nonspecific bowel gas pattern in the upper  abdomen.   2.  Nasogastric tube tip terminates overlying the expected location of the pylorus or first duodenal segment.   3.  Atherosclerosis      DX-ABDOMEN FOR TUBE PLACEMENT   Final Result         1.  Nonspecific bowel gas pattern in the upper abdomen.   2.  Nasogastric tube with side-port at the gastroesophageal junction, recommend advancement.   3.  Right midlung nodular density, recommend follow-up CT chest for further characterization.   4.  Atherosclerosis      These findings were discussed with the patient's clinician, Dr. Puente, on 11/4/2022 7:25 AM.      EC-ECHOCARDIOGRAM COMPLETE W/O CONT   Final Result      DX-CHEST-PORTABLE (1 VIEW)   Final Result      No acute cardiac or pulmonary abnormalities are identified.      CT-CTA NECK WITH & W/O-POST PROCESSING   Final Result      1.  Occluded LEFT extracranial internal carotid artery. This could be due to thromboembolic disease or dissection.   2.  Estimated 50-75% stenosis of the proximal RIGHT internal carotid artery   3.  Thyroid nodules      CT-CEREBRAL PERFUSION ANALYSIS   Final Result      1.  Moderate sized completed area of infarction noted in the left frontal parasylvian region.      CT-CTA HEAD WITH & W/O-POST PROCESS   Final Result      1.  Occlusion of the imaged extracranial LEFT internal carotid artery through the cavernous segment with reconstitution of the supraclinoid segment   2.  LEFT M3 branch occlusion            Findings were communicated with and acknowledged by Dr. Sultana via [employer] on 11/2/2022 2:34 PM.      CT-HEAD W/O   Final Result      1.  Moderate sized acute area of infarction involving the left frontal parasylvian region.      2.  Smaller encephalomalacia thinning to the cortex in the right parietal-occipital region.      3.  Mild age-related cerebral atrophy.                Assessment/Plan  * Acute CVA (cerebrovascular accident) (HCC)- (present on admission)  Assessment & Plan  MRI noted with left frontal insular  temporal infarct    Continue aspirin atorvastatin    Evaluated by neurology with recommendation for Zio patch on discharge    PT/OT recommending postacute placement no accepting SNF discussed with case management who will follow up with family about applying for Medicaid - plan to return to home with family in the meantime - case management arranging some follow up  S/p peg     Patient does not have insurance and will be difficult placement to skilled nursing facility.    Hypotensive episode- (present on admission)  Assessment & Plan  11/24: Bood pressures dropping to 77/46 today.  Furosemide and lisinopril discontinued.  Given LR 1 L bolus.  Started on midodrine 10 mg 3 times a day.  Started on maintenance LR at 75 mils per hour.    Acute hypoxemic respiratory failure (HCC)- (present on admission)  Assessment & Plan  11/23: Patient continues to require 2 LPM oxygen via nasal cannula.  Have started on PEP therapy, DuoNeb treatments, in order to wean off patient's oxygen.  Have started the patient on low-dose furosemide 20 mg daily as her NT BNP is mildly elevated.  11/24: Improving to 1 LPM oxygen via oxygen mask.  Becoming hypotensive.  Furosemide discontinued and started 1 L LR bolus.    Acute ischemic left ICA stroke (HCC)- (present on admission)  Assessment & Plan  As per CT angiogram.  Because of patient's stroke.    Atorvastatin and aspirin.    Weakness of right upper extremity- (present on admission)  Assessment & Plan  Secondary to stroke.    Physical therapy and Occupational Therapy as tolerated    Dysphagia  Assessment & Plan  Status post stroke  Speech following  S/p peg  Fees today    Failure to thrive in adult- (present on admission)  Assessment & Plan  Discharge disposition is difficult.  Patient does not have insurance.    Thyroid nodule  Assessment & Plan  Noted on CTA of neck will need outpatient follow-up  TSH normal    Carotid stenosis  Assessment & Plan  Aspirin statin  Outpatient monitoring and  follow-up    Vision loss  Assessment & Plan  Will need outpatient ophthalmology evaluation    HLD (hyperlipidemia)  Assessment & Plan  Continue atorvastatin    Type 2 diabetes mellitus (HCC)  Assessment & Plan   Hemoglobin A1c 11.4  Blood glucose currently controlled    Continue glargine insulin 10 units evenings  Continue sliding-scale insulin  Hypoglycemia protocol         VTE prophylaxis: SCDs/TEDs and enoxaparin ppx    I have performed a physical exam and reviewed and updated ROS and Plan today (11/24/2022). In review of yesterday's note (11/23/2022), there are no changes except as documented above.

## 2022-11-25 NOTE — ASSESSMENT & PLAN NOTE
Persistent despite treatment for cystitis  Likely due to CVA and subclinical aspiration  Comfort care

## 2022-11-25 NOTE — THERAPY
Missed Therapy     Patient Name: Jacqueline Webb  Age:  56 y.o., Sex:  female  Medical Record #: [id number]  Today's Date: 11/25/2022    Pt greeted and declined therapy today. Pt reminded of benefits of continuing therapy and that we had previously discussed working together today since she declined last attempt, pt continued to decline. When therapist mentioned attempting again on Monday, the patient shook her head no.      11/25/22 4329   Treatment Variance   Reason For Missed Therapy Non-Medical - Patient Refused   Interdisciplinary Plan of Care Collaboration   IDT Collaboration with  Physical Therapist;Nursing   Patient Position at End of Therapy In Bed;Bed Alarm On;Call Light within Reach  (all needs met, positioning pillows in place)   Collaboration Comments RN aware of attempt   Session Information   Date / Session Number  11/21-8 (1/3, 11/24) attempt 11/23, 11/25   Supervising Physical Therapist (PTA Treatments Only)   Supervising Physical Therapist Mona Craft

## 2022-11-25 NOTE — THERAPY
Missed Therapy     Patient Name: Jacqueline Webb  Age:  56 y.o., Sex:  female  Medical Record #: [id number]  Today's Date: 11/25/2022    Attempted to see pt for OT tx. Pt declining at this time. Provided education and encouragement pt continued to decline. Pt nodded head yes when asked if she would be agreeable another time. When asking again if she would mobilize OOB pt continued to nod no. Will try again later as able.

## 2022-11-26 NOTE — CARE PLAN
The patient is Stable - Low risk of patient condition declining or worsening    Shift Goals  Clinical Goals: maintain skin integrity  Patient Goals: rest  Family Goals: khadra    Progress made toward(s) clinical / shift goals:  Pt turned Q2 hours, frequent linen changes, pillows for offloading.      Problem: Skin Integrity  Goal: Skin integrity is maintained or improved  Outcome: Progressing

## 2022-11-26 NOTE — CARE PLAN
The patient is Stable - Low risk of patient condition declining or worsening    Shift Goals  Clinical Goals: maintain skin integrity  Patient Goals: rest  Family Goals: khadra    Progress made toward(s) clinical / shift goals:  Barrier cream was put on sacrum and patient was turned q2 hours.    Patient is not progressing towards the following goals:

## 2022-11-26 NOTE — PROGRESS NOTES
Pt is nonverbal, oriented to self, able to gesture/ nod yes and no. Bolus diabetisource given with water flushes. Verified with lab U/A sample was sent. Skin and fall precautions in place.

## 2022-11-26 NOTE — PROGRESS NOTES
Received bedside report from RN, pt care assumed. VSS, pt assessment complete. Pt A&Ox1 to self, c/o 0/10 pain at this time. Pt denies any additional needs at this time. Free water flush given. Bed locked and in lowest position, bed alarm on. Call light within reach, hourly rounding initiated.

## 2022-11-27 NOTE — PROGRESS NOTES
Received bedside report from night shift RN.   Assumed care of patient at change of shift.   Assessment complete and POC discussed.   Patient is A&Ox1, vital signs are stable.  Patient weaned off oxygen this morning, satting at 93-96% on  room air, even while resting.  Will continue to monitor 02 sats with  at bedside.  Patient has flat affect, no interest in any oral food even with encouragement.  Patient took one bite of cream of wheat and one bite of pureed fruit, did not want anymore.  Diabetisource carton given for coverage.  PEG tube has small amount of purulent drainage - open to air.   No apparent signs of distress or discomfort.   Bed is in lowest/locked position.   Call light and belongings are within reach.   No further needs at this time.     1005:  machine alarming. Patient desatting ~85-86% on RA. Patient put back on 1.5 L of oxygen via mask to get 02 sats >90.

## 2022-11-27 NOTE — CARE PLAN
The patient is Stable - Low risk of patient condition declining or worsening    Shift Goals  Clinical Goals: maintain skin integrity  Patient Goals: rest  Family Goals: khadra    Progress made toward(s) clinical / shift goals:  Pt on q2 turns, frequent linen changes and offloading.      Problem: Skin Integrity  Goal: Skin integrity is maintained or improved  Outcome: Progressing

## 2022-11-27 NOTE — CARE PLAN
The patient is Stable - Low risk of patient condition declining or worsening    Shift Goals  Clinical Goals: Maintain skin integrity, increase oral intake  Patient Goals: rest  Family Goals: khadra    Progress made toward(s) clinical / shift goals:  Patient has no interest in eating any oral foods. This RN has offered multiple times throughout shift, patient shakes her head 'no'. Does not open mouth when prompted to take bite of food, despite encouragement. Skin integrity maintained with waffle, frequent turns and repositioning, patient refusing prevalon boots at this time.    Patient is not progressing towards the following goals:  Problem: Knowledge Deficit - Stroke Education  Goal: Patient's knowledge of stroke and risk factors will improve  Outcome: Not Progressing     Problem: Psychosocial - Patient Condition  Goal: Patient's ability to verbalize feelings about condition will improve  Outcome: Not Progressing  Goal: Patient's ability to re-evaluate and adapt role responsibilities will improve  Outcome: Not Progressing     Problem: Neuro Status  Goal: Neuro status will remain stable or improve  Outcome: Not Progressing     Problem: Knowledge Deficit - Standard  Goal: Patient and family/care givers will demonstrate understanding of plan of care, disease process/condition, diagnostic tests and medications  Outcome: Not Progressing

## 2022-11-27 NOTE — PROGRESS NOTES
Pt on bed, alert and oriented x1, nonverbal, on oxygen via oxymask, on . Tube feeding and flushes administered. Encouraged to use right hand with passive exercises to prevent spasticity. Skin preventions and fall precautions in place.

## 2022-11-28 PROBLEM — N30.00 ACUTE CYSTITIS WITHOUT HEMATURIA: Status: ACTIVE | Noted: 2022-01-01

## 2022-11-28 NOTE — PROGRESS NOTES
Received bedside report from night shift RN.   Assumed care of patient at change of shift.   Assessment complete and POC discussed.   Unable to assess orientation, patient is nonverbal.  Vital signs are stable, on 2L of oxygen via oxymask satting at 96%.  No apparent signs of distress or discomfort.   Patient continues to decline any oral intake.  Patient shakes her head no when prompting to take bite of food.  Patient allowing RN to give diabetisource supplement through PEG.  Bed is in lowest/locked position.   Call light and belongings are within reach.   No further needs at this time.

## 2022-11-28 NOTE — THERAPY
"Missed Therapy     Patient Name: Jacqueline Webb  Age:  56 y.o., Sex:  female  Medical Record #: [id number]  Today's Date: 11/28/2022    Attempted to see pt for OT tx. Pt declining therapy today. Discussed goals of OT and increasing OOB activity and strength. Pt continued to nod her head \"no\" when encouraged and education about increasing strength and independence in ADLs/ADL transfers. Pt appears to be withdrawn and when asked if she felt like her current limitations are making her not want to participate she nodded \"yes.\" Asked her if she understood that not getting OOB will make her weaker she nodded \"yes.\" Educated that if she declines to work w/ OT next attempt she will be discharged from OT services and she nodded her head that she understood. Will try again at a later time.   "

## 2022-11-28 NOTE — PROGRESS NOTES
Received report from HELENA Rodriguez. Pt is nonverbal. Pt on 4L via oxymask, no signs of acute distress. Pt has no SXS of pain. 1 carton of Diabetisource administered via peg tube. POC discussed with patient. Safety precautions in place, bed in lowest position, and call light and personal belongings within reach. Hourly rounding in place.

## 2022-11-28 NOTE — THERAPY
"Missed Therapy     Patient Name: Jacqueline Webb  Age:  56 y.o., Sex:  female  Medical Record #: [id number]  Today's Date: 11/28/2022    Attempted PT treatment session. Pt declines to participate and has had multiple consecutive refusals. Pt nods/shakes head yes/no appropriately. Pt ultimately states she does not want to particpate in mobility and does not want PT to return. She wishes to remain in bed. She denies pain, fatigue, or fear limiting mobility, and endorses \"just feeling not in the mood\" to participate. Pt nods head \"yes\" to understanding importance of mobility and role of PT, nods head \"yes\" that she understands PT can return should she decide she would like to work with therapy. Will complete order per pt's wishes at this time. Please re-consult should needs change.   "

## 2022-11-29 NOTE — PROGRESS NOTES
Received bedside report from RN, pt care assumed. VSS, pt assessment complete. Pt A&Ox1,  c/o 0/10 pain at this time. POC discussed with pt and verbalizes no questions. Pt denies any additional needs at this time. Bed locked and in lowest position, bed alarm on. Pt educated on fall risk and verbalized understanding, call light within reach, hourly rounding initiated.  Free water flush and bolus feeding given.

## 2022-11-29 NOTE — THERAPY
Occupational Therapy       Patient Name: Jacqueline Webb  Age:  56 y.o., Sex:  female  Medical Record #: [id number]  Today's Date: 11/29/2022      Precautions: Fall Risk, PEG Tube, Swallow Precautions ( See Comments)  Comments: non verbal but nods head, R side deficits    Assessment    Attempted to see pt for OT tx. Continued education on importance of therapy and increasing OOB activity tolerance. Pt continued to decline and nodding her head appropriately during discussion. Pt nodding her head that she understands that she is being discharged from OT services d/t not participating. At this time pt will be discharged from OT services. Should pt want to participate re order therapy.     Plan    Continue current treatment plan.    DC Equipment Recommendations: Other (Comments) (hospital bed, lisa and bedside commode)  Discharge Recommendations: Other - At this time pt is declining to work with therapy, recommend home w/ family support. If pt's family is unable to provide care recommend facility that is able to provide assist w/ ADLs.        11/29/22 1025   Short Term Goals   Short Term Goal # 1 seated light grooming using left UE with min a.   Goal Outcome # 1 Progressing slower than expected   Short Term Goal # 2 Toilleting with min a on BSC or in bathroom.   Goal Outcome # 2 Progressing slower than expected   Anticipated Discharge Equipment and Recommendations   DC Equipment Recommendations Other (Comments)  (hospital bed, lisa and bedside commode)   Discharge Recommendations Other -

## 2022-11-29 NOTE — CARE PLAN
The patient is Stable - Low risk of patient condition declining or worsening    Shift Goals  Clinical Goals: maintain skin integrity  Patient Goals: rest  Family Goals: khadra    Progress made toward(s) clinical / shift goals: Put mepilex on patient's heels for skin breakdown prophylaxis as well as barrier paste on sacrum.   Problem: Skin Integrity  Goal: Skin integrity is maintained or improved  Outcome: Progressing     Problem: Fall Risk  Goal: Patient will remain free from falls  Outcome: Progressing       Patient is not progressing towards the following goals:

## 2022-11-29 NOTE — THERAPY
Missed Therapy     Patient Name: Jacqueline Webb  Age:  56 y.o., Sex:  female  Medical Record #: [id number]  Today's Date: 11/28/2022    Discussed missed therapy with RN       11/28/22 7725   Interdisciplinary Plan of Care Collaboration   IDT Collaboration with  Nursing   Collaboration Comments Attempted dysphagia treatment. Pt declines to participate, shaking her head to all offers of different PO. She appears to have intact comprehension, shaking her head yes/no appropriately. She reports that she is not interested in eating. She understands that not moving will lead to muscle atrophy but continues to refuse. Will continue to follow x2 more times to encourage participation in swallow exercises.

## 2022-11-29 NOTE — PROGRESS NOTES
Hospital Medicine Daily Progress Note    Date of Service  11/28/2022    Chief Complaint  Jacqueline Webb is a 56 y.o. female admitted 11/2/2022 with altered mental status    Hospital Course  Jacqueline Webb is a 56 y.o. female who presented 11/2/2022 with a history of type 2 diabetes, high cholesterol, vision loss who presented with altered mental status.  ED patient was found to have a left-sided gaze preference, NIH 25.  Subsequent MRI noted with left frontal insular temporal infarct.  Evaluated by neurology recommended continue aspirin, Lipitor and follow-up with outpatient [facility].  PT/OT recommending postacute placement but patient has no insurance or payor source. Case management working with family to arrange discharge to home with family assistance.       Interval Problem Update  11/23: No significant events overnight.  Patient remains nonverbal.  She remains on 2 LPM oxygen via nasal cannula.  Reported respiratory treatments as well as PEP therapy in order to wean off her oxygen.  NT proBNP is mildly elevated at 675.  On starting furosemide 20 mg by mouth daily.  Patient can be discharged to home if she is able to wean off of oxygen.  Blood glucose have been stable glargine insulin 10 units daily.  Patient continues on a puréed diet.  Also bolus feeds through the PEG tube.    11/24: Patient becoming more lethargic today.  Blood pressures dropping to 77/46 today.  Furosemide discontinued.  Given LR 1 L bolus.  Started on midodrine 10 mg 3 times a day.  White count mildly increased at 14.3.  Procalcitonin yesterday was 0.1.  No signs of active infection.  Repeat procalcitonin level hospitalist urinalysis has been ordered.  Currently on 1 LPM oxygen mask.    11/25: Patient is more awake this morning.  Blood pressures remaining stable in the 100s/70s on midodrine 10 mg 3 times a day as IV fluids.  I have discontinued patient's IV fluids as well as decreased her midodrine to 5 mg 3 times a day.   White count is still elevated at 12.7.  Procalcitonin yesterday was 0.1.  There is no signs of infection but there is a concern about possibility of UTI.  We are obtaining urinalysis through straight catheterization.  She is currently on 1 LPM oxygen via oxygen mask.  Blood glucose in the 130s to 170s.  Glargine insulin increased to 11 units from 10 units.  Patient refusing to work with PT and OT today.    11/28: No significant events overnight. Continuing to require 2-3 LPM oxygen via mask.  IV fluids have been discontinued.  Continues on midodrine 5 mg TID.   Continues on glargine insuline 11 units with regular insulin sliding scale.  Plan to discharge with patient portable oxygen concentrator in next couple of days to the family.  Patient is on third day of ceftriaxone, which was started due to positive urinalysis and leukocytosis.  Urine cultures is growing pansensitive E. coli.         I have discussed this patient's plan of care and discharge plan with the Complex Discharge Planning Committee.    Consultants/Specialty  neurology    Code Status  DNAR/DNI    Disposition  Patient is medically cleared for discharge.   Anticipate discharge to to home with organized home healthcare and close outpatient follow-up.  I have placed the appropriate orders for post-discharge needs.    Review of Systems  Review of Systems   Unable to perform ROS: Patient nonverbal      Physical Exam  Temp:  [36.2 °C (97.1 °F)-36.4 °C (97.6 °F)] 36.4 °C (97.6 °F)  Pulse:  [82-89] 87  Resp:  [16-18] 17  BP: ()/(56-84) 98/61  SpO2:  [95 %-99 %] 97 %    Physical Exam  Vitals and nursing note reviewed.   Constitutional:       General: She is not in acute distress.     Appearance: She is ill-appearing.   HENT:      Head: Normocephalic and atraumatic.      Nose: No rhinorrhea.      Mouth/Throat:      Pharynx: No oropharyngeal exudate or posterior oropharyngeal erythema.   Eyes:      General: No scleral icterus.        Right eye: No  discharge.         Left eye: No discharge.      Conjunctiva/sclera: Conjunctivae normal.   Cardiovascular:      Rate and Rhythm: Normal rate and regular rhythm.      Heart sounds: Normal heart sounds. No murmur heard.    No friction rub. No gallop.   Pulmonary:      Effort: Pulmonary effort is normal. No respiratory distress.      Breath sounds: Normal breath sounds. No stridor. No wheezing, rhonchi or rales.   Chest:      Chest wall: No tenderness.   Abdominal:      General: Bowel sounds are normal. There is no distension.      Palpations: Abdomen is soft. There is no mass.      Tenderness: There is no abdominal tenderness. There is no rebound.      Comments: PEG   Musculoskeletal:         General: No swelling or tenderness.      Cervical back: Neck supple. No rigidity.   Skin:     General: Skin is warm and dry.      Coloration: Skin is pale. Skin is not cyanotic or jaundiced.      Nails: There is no clubbing.   Neurological:      Mental Status: She is alert.      Cranial Nerves: No cranial nerve deficit.      Motor: Weakness present.      Comments: Profound expressive aphasia.  Opening her eyes spontaneously.  Moving her bilateral lower extremities as well as left upper extremity with 3-5/5 strength.  1/5 strength in the right upper extremity.   Psychiatric:         Mood and Affect: Mood normal.         Behavior: Behavior normal.       Fluids    Intake/Output Summary (Last 24 hours) at 11/28/2022 1906  Last data filed at 11/28/2022 1220  Gross per 24 hour   Intake 1100 ml   Output no documentation   Net 1100 ml         Laboratory  Recent Labs     11/26/22  0650 11/27/22  0813   WBC 12.2* 13.1*   RBC 4.36 4.11*   HEMOGLOBIN 12.6 11.9*   HEMATOCRIT 38.6 36.4*   MCV 88.5 88.6   MCH 28.9 29.0   MCHC 32.6* 32.7*   RDW 37.0 37.5   PLATELETCT 367 413   MPV 8.9* 9.0         Recent Labs     11/26/22  0650 11/27/22  0813   SODIUM 134* 135   POTASSIUM 4.8 4.3   CHLORIDE 100 100   CO2 25 28   GLUCOSE 147* 135*   BUN 15 17    CREATININE 0.35* 0.30*   CALCIUM 9.2 9.1                           Imaging  DX-CHEST-PORTABLE (1 VIEW)   Final Result      Decreasing lung volumes and new perihilar atelectasis favored over consolidation      CT-CHEST (THORAX) WITH   Final Result      1.  No evidence of pulmonary nodule. Questioned pulmonary nodule on abdominal film due to callus formation from subacute/chronic right anterior fourth rib fracture.      2.  Porcelain gallbladder.      Fleischner Society pulmonary nodule recommendations:   Not Applicable         DX-ABDOMEN FOR TUBE PLACEMENT   Final Result      Enteric tube with catheter tip in the left upper quadrant consistent with intragastric location.      MR-BRAIN-W/O   Final Result         Acute infarct in the left frontal, insular and left medial temporal region. Small acute infarct also noted in the left internal capsule anterior limb and adjacent basal ganglia.      Occlusion of the left distal cervical and petrous ICA.      Remote right parietal infarct.      Age-related volume loss and chronic microvascular ischemic changes.      DX-ABDOMEN FOR TUBE PLACEMENT   Final Result         1.  Nonspecific bowel gas pattern in the upper abdomen.   2.  Nasogastric tube tip terminates overlying the expected location of the pylorus or first duodenal segment.   3.  Atherosclerosis      DX-ABDOMEN FOR TUBE PLACEMENT   Final Result         1.  Nonspecific bowel gas pattern in the upper abdomen.   2.  Nasogastric tube with side-port at the gastroesophageal junction, recommend advancement.   3.  Right midlung nodular density, recommend follow-up CT chest for further characterization.   4.  Atherosclerosis      These findings were discussed with the patient's clinician, Dr. Puente, on 11/4/2022 7:25 AM.      EC-ECHOCARDIOGRAM COMPLETE W/O CONT   Final Result      DX-CHEST-PORTABLE (1 VIEW)   Final Result      No acute cardiac or pulmonary abnormalities are identified.      CT-CTA NECK WITH & W/O-POST  PROCESSING   Final Result      1.  Occluded LEFT extracranial internal carotid artery. This could be due to thromboembolic disease or dissection.   2.  Estimated 50-75% stenosis of the proximal RIGHT internal carotid artery   3.  Thyroid nodules      CT-CEREBRAL PERFUSION ANALYSIS   Final Result      1.  Moderate sized completed area of infarction noted in the left frontal parasylvian region.      CT-CTA HEAD WITH & W/O-POST PROCESS   Final Result      1.  Occlusion of the imaged extracranial LEFT internal carotid artery through the cavernous segment with reconstitution of the supraclinoid segment   2.  LEFT M3 branch occlusion            Findings were communicated with and acknowledged by Dr. Sultana via [employer] on 11/2/2022 2:34 PM.      CT-HEAD W/O   Final Result      1.  Moderate sized acute area of infarction involving the left frontal parasylvian region.      2.  Smaller encephalomalacia thinning to the cortex in the right parietal-occipital region.      3.  Mild age-related cerebral atrophy.              Personally reviewed the patient's chest x-ray performed on 11/24/2022.  Per my read mild right lower lobe interstitial infiltrates.  Sharp costophrenic angles bilaterally.  Contracted lung volumes.      Assessment/Plan  * Acute CVA (cerebrovascular accident) (HCC)- (present on admission)  Assessment & Plan  MRI noted with left frontal insular temporal infarct    Continue aspirin atorvastatin    Evaluated by neurology with recommendation for Zio patch on discharge    PT/OT recommending postacute placement no accepting SNF discussed with case management who will follow up with family about applying for Medicaid - plan to return to home with family in the meantime - case management arranging some follow up  S/p peg     Patient does not have insurance and will be difficult placement to skilled nursing facility.    Acute cystitis without hematuria- (present on admission)  Assessment & Plan  11/28: Continues  on IV ceftriaxone this is the third day in the last day due to positive urinalysis.  Unclear whether patient is symptomatic.  But continues to have a leukocytosis.  Urine cultures is growing pansensitive E. coli.     Leukocytosis- (present on admission)  Assessment & Plan  11/25: Persistent leukocytosis of 12.7.  No fevers.  Procalcitonin yesterday was negative.  Obtaining urinalysis via straight catheterization.  Holding off antibiotics at this time.    Hypotensive episode- (present on admission)  Assessment & Plan  11/24: Bood pressures dropping to 77/46 today.  Furosemide and lisinopril discontinued.  Given LR 1 L bolus.  Started on midodrine 10 mg 3 times a day.  Started on maintenance LR at 75 mils per hour.  11/25: Blood pressures remaining stable in the 100s/70s on midodrine 10 mg 3 times a day as IV fluids.  I have discontinued patient's IV fluids as well as decreased her midodrine to 5 mg 3 times a day.  11/28: Systolic blood pressures in the 90s to 100s.  Continues on midodrine 5 mg 3 times a day.  IV fluids have been discontinued.     Acute hypoxemic respiratory failure (HCC)- (present on admission)  Assessment & Plan  11/23: Patient continues to require 2 LPM oxygen via nasal cannula.  Have started on PEP therapy, DuoNeb treatments, in order to wean off patient's oxygen.  Have started the patient on low-dose furosemide 20 mg daily as her NT BNP is mildly elevated.  11/24: Improving to 1 LPM oxygen via oxygen mask.  Becoming hypotensive.  Furosemide discontinued and started 1 L LR bolus.  11/25: Continues on 1 LPM oxygen via oxygen mask.  Blood pressure is improving.  Trying to wean off oxygen.  11/28: Unable to wean off oxygen despite diuresis.  Became hypotensive requiring additional fluids and midodrine.  Continues on 2-3 LPM oxygen via oxygen mask.  Plan to discharge to home with family with portable oxygen concentrator as the patient does not have insurance.    Oropharyngeal dysphagia- (present on  admission)  Assessment & Plan  Status post stroke  Speech following  S/p peg  Patient passed FEES exam.  Mild oropharyngeal dysphagia as per exam.    11/25: Continue 1:1 feeding with modified diet and bolus tube feeds.  11/28: Continue 1:1 feeding with modified diet and bolus tube feeds.    Acute ischemic left ICA stroke (HCC)- (present on admission)  Assessment & Plan  As per CT angiogram.  Because of patient's stroke.    Atorvastatin and aspirin.    Weakness of right upper extremity- (present on admission)  Assessment & Plan  Secondary to stroke.    Physical therapy and Occupational Therapy as tolerated    Failure to thrive in adult- (present on admission)  Assessment & Plan  Discharge disposition is difficult.  Patient does not have insurance.    Type 2 diabetes mellitus with hyperglycemia, without long-term current use of insulin ([facility])- (present on admission)  Assessment & Plan   Hemoglobin A1c 11.4  Blood glucose currently controlled    11/25:  Blood glucose in the 130s to 170s.  Glargine insulin increased to 11 units from 10 units.  Continue sliding-scale insulin  Hypoglycemia protocol      Thyroid nodule  Assessment & Plan  Noted on CTA of neck will need outpatient follow-up  TSH normal    Carotid stenosis  Assessment & Plan  Aspirin statin  Outpatient monitoring and follow-up    Vision loss  Assessment & Plan  Will need outpatient ophthalmology evaluation    HLD (hyperlipidemia)  Assessment & Plan  Continue atorvastatin       VTE prophylaxis: SCDs/TEDs and enoxaparin ppx    I have performed a physical exam and reviewed and updated ROS and Plan today (11/28/2022). In review of yesterday's note (11/27/2022), there are no changes except as documented above.

## 2022-11-29 NOTE — DISCHARGE PLANNING
Case Management Discharge Planning    Admission Date: 11/2/2022  GMLOS: 4.5  ALOS: 27    6-Clicks ADL Score: 6  6-Clicks Mobility Score: 9  PT and/or OT Eval ordered: Yes  Post-acute Referrals Ordered: Yes  Post-acute Choice Obtained: Yes  Has referral(s) been sent to post-acute provider:  Yes      Anticipated Discharge Dispo: Discharge Disposition: Discharged to home/self care (01)    DME Needed: Yes    DME Ordered: Yes    Action(s) Taken: Updated Provider/Nurse on Discharge Plan    Escalations Completed: Long Length of Stay Committee     Medically Clear: Yes    Next Steps: follow up with sister    Barriers to Discharge: None    Is the patient up for discharge tomorrow: No    PC to [employer], spoke to SERA Mills told that they have been speaking to patient's sister Karuna about applying for Medicaid for the patient. Karuna stated she was trying to either obtain guardianship or POA, working with an .  Lina stated that initially they though patient would not qualify due to her annuity, but have since learned will be approved as Medicaid would divide her income by 12 months.  Karuna was suppose to come in yesterday, but did not make it.  Sera told if patient discharged prior to 30 days then a regular medicaid application could be submitted with the sister signing.  If patient is here 30 days, then it would be a MABAD application, takes longer and they require supporting documentation     PC to Karuna [phone]; message left to call SERA about discharging patient home today.

## 2022-11-29 NOTE — CARE PLAN
The patient is Stable - Low risk of patient condition declining or worsening    Shift Goals  Clinical Goals: Maintain skin integrity, increase oral intake  Patient Goals: rest  Family Goals: khadra    Progress made toward(s) clinical / shift goals:  Skin integrity maintained with waffle, Q2 turns, frequent incontinent checks, pillows. Patient refusing prevalon boots and mepilex.

## 2022-11-30 NOTE — DIETARY
Nutrition support weekly update:  Day 28 of admit.  Jacqueline Webb is a 56 y.o. female with admitting DX of acute CVA, failure to thrive.  Tube feeding initiated on 11/4. Current TF via (route)  PEG is Diabetisource AC goal rate 4 Cartons providing 1200 kcals, 60 grams protein, 820 mL free water, 100 (g of CHO).      Assessment:  Weight 135 pounds/61.5 kg (11/19)  Requested updated weight as able.      Evaluation:   Discussed with RN who reports that patient is not eating and has been receiving full prescription of tube feed - Diabetisource AC 4 cartons per day.   Level 4 pureed diet with mildly thick liquids ordered.   Labs and meds reviewed   Last BM 11/30  Current feeding remain appropriate at this time.       Malnutrition risk: Does not meet criteria at this time.     Recommendations/Plan:  Continue with feeds as ordered.   PO first and bolus Diabetisource AC dependent on PO intake - per order.   Please obtain updated weight as able.     RD will continue to monitor.

## 2022-11-30 NOTE — CARE PLAN
The patient is Stable - Low risk of patient condition declining or worsening    Shift Goals  Clinical Goals: maintain skin integrity  Patient Goals: rest  Family Goals: DALE    Progress made toward(s) clinical / shift goals:  Patient has had two bowel movements during shift. Patient is being turned every two hours and does not show signs of skin breakdown. Patient does not show signs of pain.  Problem: Bowel Elimination  Goal: Establish and maintain regular bowel function  Outcome: Progressing     Problem: Skin Integrity  Goal: Skin integrity is maintained or improved  Outcome: Progressing     Problem: Pain - Standard  Goal: Alleviation of pain or a reduction in pain to the patient’s comfort goal  Outcome: Progressing       Patient is not progressing towards the following goals:

## 2022-11-30 NOTE — CARE PLAN
The patient is Stable - Low risk of patient condition declining or worsening    Shift Goals  Clinical Goals: safety  Patient Goals: rest  Family Goals: khadra    Progress made toward(s) clinical / shift goals:      Patient is not progressing towards the following goals:      Problem: Optimal Care of the Stroke Patient  Goal: Optimal emergency care for the stroke patient  Outcome: Not Progressing  Goal: Optimal acute care for the stroke patient  Outcome: Not Progressing     Problem: Knowledge Deficit - Stroke Education  Goal: Patient's knowledge of stroke and risk factors will improve  Outcome: Not Progressing     Problem: Psychosocial - Patient Condition  Goal: Patient's ability to verbalize feelings about condition will improve  Outcome: Not Progressing  Goal: Patient's ability to re-evaluate and adapt role responsibilities will improve  Outcome: Not Progressing     Problem: Discharge Planning - Stroke  Goal: Ensure Stroke Core Measures are met prior to discharge  Outcome: Not Progressing  Goal: Patient’s continuum of care needs will be met  Outcome: Not Progressing     Problem: Neuro Status  Goal: Neuro status will remain stable or improve  Outcome: Not Progressing     Problem: Hemodynamic Monitoring  Goal: Patient's hemodynamics, fluid balance and neurologic status will be stable or improve  Outcome: Not Progressing     Problem: Respiratory - Stroke Patient  Goal: Patient will achieve/maintain optimum respiratory rate/effort  Outcome: Not Progressing     Problem: Dysphagia  Goal: Dysphagia will improve  Outcome: Not Progressing     Problem: Risk for Aspiration  Goal: Patient's risk for aspiration will be absent or decrease  Outcome: Not Progressing     Problem: Urinary Elimination  Goal: Establish and maintain regular urinary output  Outcome: Not Progressing     Problem: Bowel Elimination  Goal: Establish and maintain regular bowel function  Outcome: Not Progressing     Problem: Mobility - Stroke  Goal: Patient's  capacity to carry out activities will improve  Outcome: Not Progressing  Goal: Spasticity will be prevented or improved  Outcome: Not Progressing  Goal: Subluxation will be prevented or improved  Outcome: Not Progressing     Problem: Self Care  Goal: Patient will have the ability to perform ADLs independently or with assistance (bathe, groom, dress, toilet and feed)  Outcome: Not Progressing     Problem: Knowledge Deficit - Standard  Goal: Patient and family/care givers will demonstrate understanding of plan of care, disease process/condition, diagnostic tests and medications  Outcome: Not Progressing     Problem: Skin Integrity  Goal: Skin integrity is maintained or improved  Outcome: Not Progressing     Problem: Fall Risk  Goal: Patient will remain free from falls  Outcome: Not Progressing     Problem: Pain - Standard  Goal: Alleviation of pain or a reduction in pain to the patient’s comfort goal  Outcome: Not Progressing

## 2022-11-30 NOTE — DISCHARGE PLANNING
Case Management Discharge Planning    Admission Date: 11/2/2022  GMLOS: 4.5  ALOS: 28    6-Clicks ADL Score: 6  6-Clicks Mobility Score: 9  PT and/or OT Eval ordered: Yes  Post-acute Referrals Ordered: Yes  Post-acute Choice Obtained: Yes  Has referral(s) been sent to post-acute provider:  Yes      Anticipated Discharge Dispo: Discharge Disposition: D/T to facility providing snf/supportive care (04)    DME Needed: Yes    DME Ordered: Yes    Action(s) Taken: Updated Provider/Nurse on Discharge Plan    Escalations Completed: Long Length of Stay Committee     Medically Clear: Yes    Next Steps: forward Physician's Assessment.     Barriers to Discharge: Pending Placement    Is the patient up for discharge tomorrow: No    PC from Marguerite Chu,  for family.  Calling to inform SW that the family is unable to take patient into their home.  Siblings all work full time and patient is dependent on care, blind, tube feeds.  Marguerite stated the family has requested a physician's assessment from [facility].  That if said item could be sent to her by email she will start on guardianship process for person and estate.  SW asked what the family is purposing for placement. Marguerite stated that since the family is unable to provide care it would be a skilled or a group home if one could be found.  SW asked about patient's income/Medicaid.  Marguerite stated from what the family has provided patient would qualify for income, but not for assist. Has about $100,000 in bank accounts, and besides interest in the home in [place] also has 1/4 interest in a home in [place].  Marguerite stated she would work on getting patient a Special Needs Trust so patient could qualify for Medicaid.  Marguerite is not sure on time frames on when a court hearing could be held, needs the assessment.  Family is open to placing patient in another facility prior to guardianship hearing.

## 2022-11-30 NOTE — PROGRESS NOTES
"Received report from off-going nurse.   Assumed pt care with reference nurse, at change of shift.   Assessment completed.   Unable to assess orientation, patient is nonverbal, does nod \"yes\" or \"no\" vital signs are stable on 2L of oxygen.   Denies pain, no apparent signs of discomfort.   Bed is in low and locked position, call light and belongings in reach, reminded to use call light.   No needs at this time.    "

## 2022-12-01 NOTE — PROGRESS NOTES
Hospital Medicine Daily Progress Note    Date of Service  11/30/2022    Chief Complaint  Jacqueline Webb is a 56 y.o. female admitted 11/2/2022 with altered mental status    Hospital Course  Jacqueline Webb is a 56 y.o. female who presented 11/2/2022 with a history of type 2 diabetes, high cholesterol, vision loss who presented with altered mental status.  ED patient was found to have a left-sided gaze preference, NIH 25.  Subsequent MRI noted with left frontal insular temporal infarct.  Evaluated by neurology recommended continue aspirin, Lipitor and follow-up with outpatient [facility].  PT/OT recommending postacute placement but patient has no insurance or payor source. Case management working with family to arrange discharge to home with family assistance.       Interval Problem Update  11/23: No significant events overnight.  Patient remains nonverbal.  She remains on 2 LPM oxygen via nasal cannula.  Reported respiratory treatments as well as PEP therapy in order to wean off her oxygen.  NT proBNP is mildly elevated at 675.  On starting furosemide 20 mg by mouth daily.  Patient can be discharged to home if she is able to wean off of oxygen.  Blood glucose have been stable glargine insulin 10 units daily.  Patient continues on a puréed diet.  Also bolus feeds through the PEG tube.    11/24: Patient becoming more lethargic today.  Blood pressures dropping to 77/46 today.  Furosemide discontinued.  Given LR 1 L bolus.  Started on midodrine 10 mg 3 times a day.  White count mildly increased at 14.3.  Procalcitonin yesterday was 0.1.  No signs of active infection.  Repeat procalcitonin level hospitalist urinalysis has been ordered.  Currently on 1 LPM oxygen mask.    11/25: Patient is more awake this morning.  Blood pressures remaining stable in the 100s/70s on midodrine 10 mg 3 times a day as IV fluids.  I have discontinued patient's IV fluids as well as decreased her midodrine to 5 mg 3 times a day.   White count is still elevated at 12.7.  Procalcitonin yesterday was 0.1.  There is no signs of infection but there is a concern about possibility of UTI.  We are obtaining urinalysis through straight catheterization.  She is currently on 1 LPM oxygen via oxygen mask.  Blood glucose in the 130s to 170s.  Glargine insulin increased to 11 units from 10 units.  Patient refusing to work with PT and OT today.    11/28: No significant events overnight. Continuing to require 2-3 LPM oxygen via mask.  IV fluids have been discontinued.  Continues on midodrine 5 mg TID.   Continues on glargine insuline 11 units with regular insulin sliding scale.  Plan to discharge with patient portable oxygen concentrator in next couple of days to the family.  Patient is on third day of ceftriaxone, which was started due to positive urinalysis and leukocytosis.  Urine cultures is growing pansensitive E. coli.       11/30: MIGEL. She is able to open her eyes, nod/shake head appropriately to questions, and follow commands. She is unable to speak when prompted. She indicates no pain. She nods for shortness of breath. We discussed that she had a stroke. I advised that she will need care for ADLs for the foreseeable future. She shook her head when asked if she wanted family to care for her, for paid caregivers at a group home, and shrugged when advised that to return home she would not have a caregiver. She physically requires significant assistance with ADLs but retains cognitive capacity to indicate preferences in her care. Certificate of needs completed for consideration of medical and financial guardian due to considerable assistance required to execute needs and desires.    I have discussed this patient's plan of care and discharge plan with the Complex Discharge Planning Committee.    Consultants/Specialty  neurology    Code Status  DNAR/DNI    Disposition  Patient is medically cleared for discharge.   Anticipate discharge to to home with  organized home healthcare and close outpatient follow-up.  I have placed the appropriate orders for post-discharge needs.    Review of Systems  Review of Systems   Unable to perform ROS: Patient nonverbal      Physical Exam  Temp:  [36 °C (96.8 °F)-36.4 °C (97.5 °F)] 36.1 °C (96.9 °F)  Pulse:  [81-96] 88  Resp:  [16-19] 18  BP: ()/(50-81) 127/56  SpO2:  [95 %-98 %] 95 %    Physical Exam  Vitals and nursing note reviewed.   Constitutional:       General: She is not in acute distress.     Appearance: She is ill-appearing.      Comments: Somnolent but arousable   HENT:      Head: Normocephalic.      Nose: Nose normal.      Mouth/Throat:      Mouth: Mucous membranes are dry.   Eyes:      General: No scleral icterus.     Conjunctiva/sclera: Conjunctivae normal.   Cardiovascular:      Rate and Rhythm: Normal rate and regular rhythm.      Pulses: Normal pulses.      Heart sounds: Normal heart sounds. No murmur heard.    No friction rub. No gallop.   Pulmonary:      Effort: Pulmonary effort is normal. No respiratory distress.      Breath sounds: Normal breath sounds. No wheezing, rhonchi or rales.   Abdominal:      General: Abdomen is flat. Bowel sounds are normal. There is no distension.      Palpations: Abdomen is soft.      Tenderness: There is no abdominal tenderness. There is no guarding or rebound.      Comments: PEG   Genitourinary:     Comments: No arroyo  Musculoskeletal:      Cervical back: Neck supple.      Right lower leg: No edema.      Left lower leg: No edema.   Skin:     General: Skin is warm and dry.      Coloration: Skin is pale. Skin is not cyanotic or jaundiced.      Nails: There is no clubbing.   Neurological:      Mental Status: She is alert.      Cranial Nerves: No cranial nerve deficit.      Motor: Weakness (1/5 RUE) present.      Comments: Profound expressive aphasia   Psychiatric:         Attention and Perception: Attention normal.         Mood and Affect: Affect is blunt.         Behavior:  Behavior is slowed. Behavior is cooperative.      Comments: Unable to fully assess - nonverbal       Fluids    Intake/Output Summary (Last 24 hours) at 11/30/2022 1902  Last data filed at 11/30/2022 1230  Gross per 24 hour   Intake 810 ml   Output 0 ml   Net 810 ml         Laboratory                                    Imaging  DX-CHEST-PORTABLE (1 VIEW)   Final Result      Decreasing lung volumes and new perihilar atelectasis favored over consolidation      CT-CHEST (THORAX) WITH   Final Result      1.  No evidence of pulmonary nodule. Questioned pulmonary nodule on abdominal film due to callus formation from subacute/chronic right anterior fourth rib fracture.      2.  Porcelain gallbladder.      Fleischner Society pulmonary nodule recommendations:   Not Applicable         DX-ABDOMEN FOR TUBE PLACEMENT   Final Result      Enteric tube with catheter tip in the left upper quadrant consistent with intragastric location.      MR-BRAIN-W/O   Final Result         Acute infarct in the left frontal, insular and left medial temporal region. Small acute infarct also noted in the left internal capsule anterior limb and adjacent basal ganglia.      Occlusion of the left distal cervical and petrous ICA.      Remote right parietal infarct.      Age-related volume loss and chronic microvascular ischemic changes.      DX-ABDOMEN FOR TUBE PLACEMENT   Final Result         1.  Nonspecific bowel gas pattern in the upper abdomen.   2.  Nasogastric tube tip terminates overlying the expected location of the pylorus or first duodenal segment.   3.  Atherosclerosis      DX-ABDOMEN FOR TUBE PLACEMENT   Final Result         1.  Nonspecific bowel gas pattern in the upper abdomen.   2.  Nasogastric tube with side-port at the gastroesophageal junction, recommend advancement.   3.  Right midlung nodular density, recommend follow-up CT chest for further characterization.   4.  Atherosclerosis      These findings were discussed with the patient's  clinician, Dr. Puente, on 11/4/2022 7:25 AM.      EC-ECHOCARDIOGRAM COMPLETE W/O CONT   Final Result      DX-CHEST-PORTABLE (1 VIEW)   Final Result      No acute cardiac or pulmonary abnormalities are identified.      CT-CTA NECK WITH & W/O-POST PROCESSING   Final Result      1.  Occluded LEFT extracranial internal carotid artery. This could be due to thromboembolic disease or dissection.   2.  Estimated 50-75% stenosis of the proximal RIGHT internal carotid artery   3.  Thyroid nodules      CT-CEREBRAL PERFUSION ANALYSIS   Final Result      1.  Moderate sized completed area of infarction noted in the left frontal parasylvian region.      CT-CTA HEAD WITH & W/O-POST PROCESS   Final Result      1.  Occlusion of the imaged extracranial LEFT internal carotid artery through the cavernous segment with reconstitution of the supraclinoid segment   2.  LEFT M3 branch occlusion            Findings were communicated with and acknowledged by Dr. Sultana via [employer] on 11/2/2022 2:34 PM.      CT-HEAD W/O   Final Result      1.  Moderate sized acute area of infarction involving the left frontal parasylvian region.      2.  Smaller encephalomalacia thinning to the cortex in the right parietal-occipital region.      3.  Mild age-related cerebral atrophy.           Assessment/Plan  * Acute CVA (cerebrovascular accident) (HCC)- (present on admission)  Assessment & Plan  MRI noted with left frontal insular temporal infarct  Continue aspirin atorvastatin  Evaluated by neurology with recommendation for Zio patch on discharge  PT/OT recommending postacute placement no accepting SNF discussed with case management who will follow up with family about applying for Medicaid     Family financial / medical guardianship in progress    Acute cystitis without hematuria- (present on admission)  Assessment & Plan  Completed ceftriaxone 3-day course for pan-susceptible E coli    Leukocytosis- (present on admission)  Assessment & Plan  Persistent  despite treatment for cystitis  Likely due to CVA and subclinical aspiration    Hypotensive episode- (present on admission)  Assessment & Plan  Likely neurogenic from CVA  Continue midodrine    Acute ischemic left ICA stroke (HCC)- (present on admission)  Assessment & Plan  As per CT angiogram.  Because of patient's stroke.    Atorvastatin and aspirin.    Weakness of right upper extremity- (present on admission)  Assessment & Plan  Secondary to stroke.  Declining further PT/OT    Acute hypoxemic respiratory failure (HCC)- (present on admission)  Assessment & Plan  Likely due to aspiration pneumonitis and atelectasis from CVA causing poor mobility    Oropharyngeal dysphagia- (present on admission)  Assessment & Plan  Status post stroke  Speech following  S/p peg  Patient passed FEES exam.  Mild oropharyngeal dysphagia as per exam.  Low volition for feeding, continue TFs per RD    Failure to thrive in adult- (present on admission)  Assessment & Plan  Discharge disposition is difficult.  Patient does not have insurance.    Thyroid nodule  Assessment & Plan  Noted on CTA of neck will need outpatient follow-up  TSH normal    Carotid stenosis  Assessment & Plan  Aspirin statin  Outpatient monitoring and follow-up    Vision loss  Assessment & Plan  Will need outpatient ophthalmology evaluation    HLD (hyperlipidemia)  Assessment & Plan  Continue atorvastatin    Type 2 diabetes mellitus with hyperglycemia, without long-term current use of insulin (HCC)- (present on admission)  Assessment & Plan   Hemoglobin A1c 11.4  Blood glucose currently controlled    11/25:  Blood glucose in the 130s to 170s.  Glargine insulin increased to 11 units from 10 units.  Continue sliding-scale insulin  Hypoglycemia protocol         VTE prophylaxis: SCDs/TEDs and enoxaparin ppx    I have performed a physical exam and reviewed and updated ROS and Plan today (11/30/2022). In review of yesterday's note (11/29/2022), there are no changes except as  documented above.

## 2022-12-01 NOTE — PROGRESS NOTES
Received report and assumed care at shift change. Unable to assess mentation, patient able to respond with a nod or move head side to side., cooperative with cares. Fall precautions in place, bed locked and in lowest position. Needs attended to. No complains of pain

## 2022-12-01 NOTE — CARE PLAN
Problem: Skin Integrity  Goal: Skin integrity is maintained or improved  Outcome: Progressing     Problem: Fall Risk  Goal: Patient will remain free from falls  Outcome: Progressing     The patient is Stable - Low risk of patient condition declining or worsening    Shift Goals  Clinical Goals: maintain skin integrity  Patient Goals: rest  Family Goals: DALE    Progress made toward(s) clinical / shift goals:  preventive measures for skin integrity in place. Fall precautions in place.     Patient is not progressing towards the following goals:

## 2022-12-01 NOTE — THERAPY
Missed Therapy     Patient Name: Jacqueline Webb  Age:  56 y.o., Sex:  female  Medical Record #: [id number]  Today's Date: 12/1/2022    Discussed missed therapy with RN       12/01/22 1031   Interdisciplinary Plan of Care Collaboration   IDT Collaboration with  Nursing   Collaboration Comments RN aware. Patient continues to refuse SLP services. Offered speech therapy to address both language and dysphagia with patient shaking her head no to both. She continues to nod/shake her head no appropriately to questions. Will re-attempt x1 more time as patient is willing to participate.

## 2022-12-01 NOTE — DOCUMENTATION QUERY
[facility]                                                                       Query Response Note      PATIENT:               KIERSTEN MITCHELL  ACCT #:                  [id number]  MRN:                     [id number]  :                      1965  ADMIT DATE:       2022 2:08 PM  DISCH DATE:          RESPONDING  PROVIDER #:        [id number]           QUERY TEXT:    Acute hypoxemic respiratory failure is documented beginning with the  Progress Note.  Per the  - current Progress Notes: pulmonary effort is normal, no respiratory distress, normal breath sounds.    Can the diagnosis of acute respiratory failure be clarified with supportive clinical indicators?    The patient's Clinical Indicators include:   Progress Note: pt nods for shortness of breath   - current Progress Notes: acute hypoxemic respiratory failure.   pt continues to require 2LPM oxygen via NC.  Have started on PEP therapy, duoneb treatments, in order to wean off pts O2.  Started on low dose furosemide 20mg daily as her NT BNP is mildly elevated.  Pulmonary: pulmonary effort is normal.  No respiratory distress.  Normal breath sounds.  No stridor, no wheezing, rhonchi or rales.   SpO2 85% on RA, 93% on 2L; max treatment of 4L mask noted on  w/ SpO2 95%  Per vitals flowsheet:  -  RR range 14-19  Risk Factors: elevated BNP  Treatment: supplemental O2, duoneb, PEP therapy, CXR    Thank you,  Mili Lozada RN, BSN  Clinical   Connect via [employer]  Options provided:   -- Acute respiratory failure ruled out, hypoxia ruled in   -- Acute respiratory failure exists, please document clinical indicators   -- Other explanation, please specify   -- Unable to determine      Query created by: Mili Lozada on 2022 11:09 AM    RESPONSE TEXT:    Acute respiratory failure ruled out, hypoxia ruled in           Electronically signed by:  FRANCISCA HARKINS MD 12/1/2022 7:32 AM

## 2022-12-01 NOTE — CARE PLAN
The patient is Stable - Low risk of patient condition declining or worsening    Shift Goals  Clinical Goals: Safety  Patient Goals: Rest  Family Goals: DALE    Progress made toward(s) clinical / shift goals:  Discussed plan of care with patient. Patient remains non-verbal, only nodding yes or no, but able to follow commands. Continuing to reposition frequently and check for incontinence         Patient is not progressing towards the following goals:

## 2022-12-02 NOTE — PROGRESS NOTES
Received report and assumed care at shift change. Unable to assess mentation, as patient is non verbal. Responds by nodding head or moving head side to side. Tolerating bolus feeding. No signs or symptoms of pain.  Fall precautions in place, bed locked and in lowest position. Needs attended to.

## 2022-12-02 NOTE — CARE PLAN
Problem: Skin Integrity  Goal: Skin integrity is maintained or improved  Outcome: Progressing     The patient is Stable - Low risk of patient condition declining or worsening    Shift Goals  Clinical Goals: safety  Patient Goals: Rest  Family Goals: DALE    Progress made toward(s) clinical / shift goals:  preventive measures for skin breakdown in place. Continue with Q 2 hour turns.     Patient is not progressing towards the following goals:

## 2022-12-03 NOTE — PROGRESS NOTES
0745: Pt refused tube bolus feed.    1230: Pt refused afternoon tube bolus feed.  Encouraged and educated pt to keep her nutrition up.  Pt still refused.  Notified MD.      1730: Refused dinner bolus and medications.  Pt indicating no by nodding head and covering peg with arms.  Encouraged and educated.  Pt still refused.

## 2022-12-03 NOTE — CARE PLAN
The patient is Stable - Low risk of patient condition declining or worsening    Shift Goals  Clinical Goals: safety  Patient Goals: comfort  Family Goals: DALE    Progress made toward(s) clinical / shift goals:  Pt refusing tube feeding.  Will allow blood glucose checks.  MD aware    Patient is not progressing towards the following goals: Pt refusing tube feeding.  Will allow blood glucose checks.  MD aware      Problem: Optimal Care of the Stroke Patient  Goal: Optimal emergency care for the stroke patient  Outcome: Progressing  Goal: Optimal acute care for the stroke patient  Outcome: Progressing     Problem: Knowledge Deficit - Stroke Education  Goal: Patient's knowledge of stroke and risk factors will improve  Outcome: Progressing     Problem: Psychosocial - Patient Condition  Goal: Patient's ability to verbalize feelings about condition will improve  Outcome: Progressing  Goal: Patient's ability to re-evaluate and adapt role responsibilities will improve  Outcome: Progressing     Problem: Discharge Planning - Stroke  Goal: Ensure Stroke Core Measures are met prior to discharge  Outcome: Progressing  Goal: Patient’s continuum of care needs will be met  Outcome: Progressing     Problem: Neuro Status  Goal: Neuro status will remain stable or improve  Outcome: Progressing     Problem: Hemodynamic Monitoring  Goal: Patient's hemodynamics, fluid balance and neurologic status will be stable or improve  Outcome: Progressing     Problem: Respiratory - Stroke Patient  Goal: Patient will achieve/maintain optimum respiratory rate/effort  Outcome: Progressing     Problem: Dysphagia  Goal: Dysphagia will improve  Outcome: Progressing     Problem: Risk for Aspiration  Goal: Patient's risk for aspiration will be absent or decrease  Outcome: Progressing     Problem: Urinary Elimination  Goal: Establish and maintain regular urinary output  Outcome: Progressing     Problem: Bowel Elimination  Goal: Establish and maintain regular  bowel function  Outcome: Progressing     Problem: Mobility - Stroke  Goal: Patient's capacity to carry out activities will improve  Outcome: Progressing  Goal: Spasticity will be prevented or improved  Outcome: Progressing  Goal: Subluxation will be prevented or improved  Outcome: Progressing     Problem: Self Care  Goal: Patient will have the ability to perform ADLs independently or with assistance (bathe, groom, dress, toilet and feed)  Outcome: Progressing     Problem: Knowledge Deficit - Standard  Goal: Patient and family/care givers will demonstrate understanding of plan of care, disease process/condition, diagnostic tests and medications  Outcome: Progressing     Problem: Skin Integrity  Goal: Skin integrity is maintained or improved  Outcome: Progressing     Problem: Fall Risk  Goal: Patient will remain free from falls  Outcome: Progressing     Problem: Pain - Standard  Goal: Alleviation of pain or a reduction in pain to the patient’s comfort goal  Outcome: Progressing

## 2022-12-03 NOTE — PROGRESS NOTES
Received report and assumed care at shift change. Still unable to assess mentation due to absence of speech. Patient is able to answer by nodding head. Tolerating bolus tube feeding.  No signs or symptoms of pain or distress. Fall precautions in place, bed locked and in lowest position. Needs attended to.

## 2022-12-03 NOTE — THERAPY
Speech Language Pathology  Daily Treatment     Patient Name: Jacqueline Webb  Age:  56 y.o., Sex:  female  Medical Record #: [id number]  Today's Date: 12/2/2022     Precautions  Precautions: Fall Risk, PEG Tube, Swallow Precautions ( See Comments)  Comments: non verbal but nods head, R side deficits    Assessment    The patient continues to refuse therapy sessions. She appropriately nods/shakes head to answer questions and follow simple commands. Per MD, she retains the cognitive capacity to indicate preferences for her care and patient currently is refusing SLP services. Since patient has refused x3, will d/c from services. Please reorder should patient want to continue to participate in speech therapy.     Plan    Discharge secondary to patient non compliance.    Discharge Recommendations: At this time pt is declining to work with therapy, recommend home w/ family support. If pt's family is unable to provide care recommend facility that is able to provide assist w/ ADLs.      Objective       12/02/22 1713   Interdisciplinary Plan of Care Collaboration   IDT Collaboration with  Nursing   Patient Position at End of Therapy In Bed;Bed Alarm On;Call Light within Reach;Tray Table within Reach   Collaboration Comments Patient continues to refuse SLP services despite extensive encouragement and education regarding the benefits of therapy and risks of decline without participation. Patient still shakes her head no with any attempt to provide therapy.

## 2022-12-03 NOTE — CARE PLAN
Problem: Skin Integrity  Goal: Skin integrity is maintained or improved  Outcome: Progressing     The patient is Stable - Low risk of patient condition declining or worsening    Shift Goals  Clinical Goals: safety  Patient Goals: comfort  Family Goals: DALE    Progress made toward(s) clinical / shift goals:  continue with Q 2 hour turns. Waffle overlay in place, use of barrier paste.     Patient is not progressing towards the following goals:

## 2022-12-04 NOTE — CARE PLAN
The patient is stable compliant with tube feeding and water flush regimen well tolerated with less than 10cc gastric residual no nausea or vomitting    Shift Goals  Clinical Goals: safety  Patient Goals: comfort  Family Goals: DALE    Progress made toward(s) clinical / shift goals:  on going    Patient is slowly progressing towards the goals above

## 2022-12-04 NOTE — PROGRESS NOTES
Hospital Medicine Daily Progress Note    Date of Service  12/4/2022    Chief Complaint  Jacqueline Webb is a 56 y.o. female admitted 11/2/2022 with altered mental status    Hospital Course  Jacqueline Webb is a 56 y.o. female who presented 11/2/2022 with a history of type 2 diabetes, high cholesterol, vision loss who presented with altered mental status.  ED patient was found to have a left-sided gaze preference, NIH 25.  Subsequent MRI noted with left frontal insular temporal infarct.  Evaluated by neurology recommended continue aspirin, Lipitor and follow-up with outpatient [facility].  PT/OT recommending postacute placement but patient has no insurance or payor source. Case management working with family to arrange discharge to home with family assistance.       Interval Problem Update  11/23: No significant events overnight.  Patient remains nonverbal.  She remains on 2 LPM oxygen via nasal cannula.  Reported respiratory treatments as well as PEP therapy in order to wean off her oxygen.  NT proBNP is mildly elevated at 675.  On starting furosemide 20 mg by mouth daily.  Patient can be discharged to home if she is able to wean off of oxygen.  Blood glucose have been stable glargine insulin 10 units daily.  Patient continues on a puréed diet.  Also bolus feeds through the PEG tube.    11/24: Patient becoming more lethargic today.  Blood pressures dropping to 77/46 today.  Furosemide discontinued.  Given LR 1 L bolus.  Started on midodrine 10 mg 3 times a day.  White count mildly increased at 14.3.  Procalcitonin yesterday was 0.1.  No signs of active infection.  Repeat procalcitonin level hospitalist urinalysis has been ordered.  Currently on 1 LPM oxygen mask.    11/25: Patient is more awake this morning.  Blood pressures remaining stable in the 100s/70s on midodrine 10 mg 3 times a day as IV fluids.  I have discontinued patient's IV fluids as well as decreased her midodrine to 5 mg 3 times a day.   White count is still elevated at 12.7.  Procalcitonin yesterday was 0.1.  There is no signs of infection but there is a concern about possibility of UTI.  We are obtaining urinalysis through straight catheterization.  She is currently on 1 LPM oxygen via oxygen mask.  Blood glucose in the 130s to 170s.  Glargine insulin increased to 11 units from 10 units.  Patient refusing to work with PT and OT today.    11/28: No significant events overnight. Continuing to require 2-3 LPM oxygen via mask.  IV fluids have been discontinued.  Continues on midodrine 5 mg TID.   Continues on glargine insuline 11 units with regular insulin sliding scale.  Plan to discharge with patient portable oxygen concentrator in next couple of days to the family.  Patient is on third day of ceftriaxone, which was started due to positive urinalysis and leukocytosis.  Urine cultures is growing pansensitive E. coli.       11/30: MIGEL. She is able to open her eyes, nod/shake head appropriately to questions, and follow commands. She is unable to speak when prompted. She indicates no pain. She nods for shortness of breath. We discussed that she had a stroke. I advised that she will need care for ADLs for the foreseeable future. She shook her head when asked if she wanted family to care for her, for paid caregivers at a group home, and shrugged when advised that to return home she would not have a caregiver. She physically requires significant assistance with ADLs but retains cognitive capacity to indicate preferences in her care. Certificate of needs completed for consideration of medical and financial guardian due to considerable assistance required to execute needs and desires.    12/4: She refused TF yesterday. I met with her at bedside. She awoke and opened her eyes to make eye contact with me. She shook her head no when asked if she had pain or shortness of breath. She nodded when asked if she was tired. I asked if she was ready to die, for which  she shook her head. I advised that given her severe CVA and uncertain recovery, if she continues to decline PT/OT and TF she will remain bedbound and develop decubitus infections, which will cause her death. I asked if she would prefer to focus only on remaining comfort and be allowed to pass when it is her time, for which she nodded. Transitioned to comfort-only measures. POC communicated with primary RN. Left VM for sister Karuna and will call again tomorrow.    I have discussed this patient's plan of care and discharge plan with the Complex Discharge Planning Committee.    Consultants/Specialty  neurology    Code Status  Comfort Care/DNR    Disposition  Patient is medically cleared for discharge.   Anticipate discharge to to hospice.  I have placed the appropriate orders for post-discharge needs.    Review of Systems  Review of Systems   Unable to perform ROS: Patient nonverbal   Respiratory:  Negative for shortness of breath.       Physical Exam  Temp:  [35.9 °C (96.6 °F)-36.5 °C (97.7 °F)] 36.5 °C (97.7 °F)  Pulse:  [84-93] 93  Resp:  [16-17] 17  BP: ()/(63-79) 98/69  SpO2:  [95 %-98 %] 96 %    Physical Exam  Vitals and nursing note reviewed.   Constitutional:       General: She is not in acute distress.     Appearance: She is ill-appearing.      Comments: Somnolent but arousable   HENT:      Head: Normocephalic.      Nose: Nose normal.      Mouth/Throat:      Mouth: Mucous membranes are dry.   Eyes:      General: No scleral icterus.     Conjunctiva/sclera: Conjunctivae normal.   Pulmonary:      Effort: Pulmonary effort is normal. No respiratory distress.   Genitourinary:     Comments: No arroyo  Musculoskeletal:      Cervical back: Neck supple.   Skin:     General: Skin is dry.      Coloration: Skin is pale. Skin is not cyanotic.      Nails: There is no clubbing.   Neurological:      Mental Status: She is alert.      Comments: Profound expressive aphasia   Psychiatric:         Attention and Perception:  Attention normal.         Mood and Affect: Affect is blunt.         Behavior: Behavior is slowed. Behavior is cooperative.      Comments: Unable to fully assess - nonverbal       Fluids    Intake/Output Summary (Last 24 hours) at 12/4/2022 1531  Last data filed at 12/4/2022 1300  Gross per 24 hour   Intake 500 ml   Output --   Net 500 ml         Laboratory                                    Imaging  DX-CHEST-PORTABLE (1 VIEW)   Final Result      Decreasing lung volumes and new perihilar atelectasis favored over consolidation      CT-CHEST (THORAX) WITH   Final Result      1.  No evidence of pulmonary nodule. Questioned pulmonary nodule on abdominal film due to callus formation from subacute/chronic right anterior fourth rib fracture.      2.  Porcelain gallbladder.      Fleischner Society pulmonary nodule recommendations:   Not Applicable         DX-ABDOMEN FOR TUBE PLACEMENT   Final Result      Enteric tube with catheter tip in the left upper quadrant consistent with intragastric location.      MR-BRAIN-W/O   Final Result         Acute infarct in the left frontal, insular and left medial temporal region. Small acute infarct also noted in the left internal capsule anterior limb and adjacent basal ganglia.      Occlusion of the left distal cervical and petrous ICA.      Remote right parietal infarct.      Age-related volume loss and chronic microvascular ischemic changes.      DX-ABDOMEN FOR TUBE PLACEMENT   Final Result         1.  Nonspecific bowel gas pattern in the upper abdomen.   2.  Nasogastric tube tip terminates overlying the expected location of the pylorus or first duodenal segment.   3.  Atherosclerosis      DX-ABDOMEN FOR TUBE PLACEMENT   Final Result         1.  Nonspecific bowel gas pattern in the upper abdomen.   2.  Nasogastric tube with side-port at the gastroesophageal junction, recommend advancement.   3.  Right midlung nodular density, recommend follow-up CT chest for further characterization.    4.  Atherosclerosis      These findings were discussed with the patient's clinician, Dr. Puente, on 11/4/2022 7:25 AM.      EC-ECHOCARDIOGRAM COMPLETE W/O CONT   Final Result      DX-CHEST-PORTABLE (1 VIEW)   Final Result      No acute cardiac or pulmonary abnormalities are identified.      CT-CTA NECK WITH & W/O-POST PROCESSING   Final Result      1.  Occluded LEFT extracranial internal carotid artery. This could be due to thromboembolic disease or dissection.   2.  Estimated 50-75% stenosis of the proximal RIGHT internal carotid artery   3.  Thyroid nodules      CT-CEREBRAL PERFUSION ANALYSIS   Final Result      1.  Moderate sized completed area of infarction noted in the left frontal parasylvian region.      CT-CTA HEAD WITH & W/O-POST PROCESS   Final Result      1.  Occlusion of the imaged extracranial LEFT internal carotid artery through the cavernous segment with reconstitution of the supraclinoid segment   2.  LEFT M3 branch occlusion            Findings were communicated with and acknowledged by Dr. Sultana via [employer] on 11/2/2022 2:34 PM.      CT-HEAD W/O   Final Result      1.  Moderate sized acute area of infarction involving the left frontal parasylvian region.      2.  Smaller encephalomalacia thinning to the cortex in the right parietal-occipital region.      3.  Mild age-related cerebral atrophy.           Assessment/Plan  * Acute CVA (cerebrovascular accident) (HCC)- (present on admission)  Assessment & Plan  MRI noted with left frontal insular temporal infarct  Continue aspirin atorvastatin  Evaluated by neurology with recommendation for Zio patch on discharge  PT/OT recommending postacute placement no accepting SNF discussed with case management who will follow up with family about applying for Medicaid     Family financial / medical guardianship in progress    Acute cystitis without hematuria- (present on admission)  Assessment & Plan  Completed ceftriaxone 3-day course for pan-susceptible E  coli    Leukocytosis- (present on admission)  Assessment & Plan  Persistent despite treatment for cystitis  Likely due to CVA and subclinical aspiration    Hypotensive episode- (present on admission)  Assessment & Plan  Likely neurogenic from CVA  Continue midodrine    Acute ischemic left ICA stroke (HCC)- (present on admission)  Assessment & Plan  As per CT angiogram.  Because of patient's stroke.    Atorvastatin and aspirin.    Weakness of right upper extremity- (present on admission)  Assessment & Plan  Secondary to stroke.  Declining further PT/OT    Acute hypoxemic respiratory failure (HCC)- (present on admission)  Assessment & Plan  Likely due to aspiration pneumonitis and atelectasis from CVA causing poor mobility    Oropharyngeal dysphagia- (present on admission)  Assessment & Plan  Status post stroke  Speech following  S/p peg  Patient passed FEES exam.  Mild oropharyngeal dysphagia as per exam.  Low volition for feeding, continue TFs per RD    Failure to thrive in adult- (present on admission)  Assessment & Plan  Discharge disposition is difficult.  Patient does not have insurance.    Thyroid nodule  Assessment & Plan  Noted on CTA of neck will need outpatient follow-up  TSH normal    Carotid stenosis  Assessment & Plan  Aspirin statin  Outpatient monitoring and follow-up    Vision loss  Assessment & Plan  Will need outpatient ophthalmology evaluation    HLD (hyperlipidemia)  Assessment & Plan  Continue atorvastatin    Type 2 diabetes mellitus with hyperglycemia, without long-term current use of insulin (HCC)- (present on admission)  Assessment & Plan   Hemoglobin A1c 11.4  Blood glucose currently controlled    11/25:  Blood glucose in the 130s to 170s.  Glargine insulin increased to 11 units from 10 units.  Continue sliding-scale insulin  Hypoglycemia protocol           VTE prophylaxis: SCDs/TEDs and enoxaparin ppx    I have performed a physical exam and reviewed and updated ROS and Plan today  (12/4/2022). In review of yesterday's note (12/3/2022), there are no changes except as documented above.

## 2022-12-05 PROBLEM — Z51.5 COMFORT MEASURES ONLY STATUS: Status: ACTIVE | Noted: 2022-01-01

## 2022-12-05 PROBLEM — F33.1 MODERATE EPISODE OF RECURRENT MAJOR DEPRESSIVE DISORDER (HCC): Status: ACTIVE | Noted: 2022-01-01

## 2022-12-05 NOTE — CARE PLAN
Jacqueline is oriented to self on 2L simple face mask. Pt able to shake head and answer yes/no questions. Q2 hour turns. Incontinent care performed. Pt not tolerating PO intake, diabeticsource boluses given via PEG tube. Vitals stable, hourly rounding performed.

## 2022-12-05 NOTE — PROGRESS NOTES
Called patient's sister, Karuna, and updated her on patient arrival to our unit and room number. She was unaware of patient's comfort care code status, explained comfort measures to her. She said her sister has been battling depression for quite some time now and does not blame her for her decision. She asked if patient has capacity to make the choice for comfort measures and this RN advised Kaurna to speak to the doctor about this, because it is not in my scope to say so. She said she received Dr. Chang's voicemail and will we waiting for his phone call today. She has a class in the morning, but is hopeful not to miss his call.

## 2022-12-05 NOTE — PROGRESS NOTES
Orders received from on call APRN to insert arroyo for hospice/comfort care. Attempted insertion, but patient refused and would not allow this RN to place.

## 2022-12-05 NOTE — PROGRESS NOTES
Assumed care of patient at 0645. Bedside report received. Assessment complete. Patient is comfort care.  Patients eyes are open. Patient nods and shakes head but provides contradictory head nods when asked about preferences. Patient not exhibiting s/s of SOB. Breathing is unlabored on 2L NC   Reporting no pain.   Skin per flow sheets. G tube in place to LUQ is malodorous and oozing pus. MD Chang notified.   Patient was receiving bolus tube feeds but dietary signed off this morning, reached out to hospitalist for clarification on nutritional needs/orders.    Denies N/V.  + void, pure wick in place. + BM12/4  Patient not ambulating at this time, comfort care, Q2 hour turns in place.   Call light is within reach, treaded slipper socks on, bed in lowest/ locked position, hourly rounding in place, all needs met at this time.

## 2022-12-05 NOTE — PROGRESS NOTES
4 Eyes Skin Assessment Completed by HELENA Castorena and HELENA Payne.    Head WDL  Ears WDL  Nose WDL  Mouth WDL  Neck WDL  Breast/Chest WDL  Shoulder Blades WDL  Spine WDL  (R) Arm/Elbow/Hand WDL  (L) Arm/Elbow/Hand WDL  Abdomen Incision  Groin WDL  Scrotum/Coccyx/Buttocks Redness and Blanching  (R) Leg WDL  (L) Leg Abrasion  (R) Heel/Foot/Toe WDL  (L) Heel/Foot/Toe WDL          Devices In Places Pulse Ox, Oxy Mask, and G Tube      Interventions In Place NC W/Ear Foams, TAP System, Pillows, Q2 Turns, Barrier Cream, and Pressure Redistribution Mattress  Switched from oxymask to NC w/ ear foams.  Possible Skin Injury No    Pictures Uploaded Into Epic N/A  Wound Consult Placed N/A  RN Wound Prevention Protocol Ordered No

## 2022-12-05 NOTE — PROGRESS NOTES
Nutrition and fluids offered to patient. Educated patient that she is now permitted to have diet. Patient given multiple options for nutrition. Patient declines at this time. Will continue to offer throughout day.

## 2022-12-05 NOTE — DIETARY
Nutrition Services: Update     Pt followed by RD for tube feeding. Pt has transitioned to comfort care measures.    RD signing off though available PRN.

## 2022-12-05 NOTE — DISCHARGE PLANNING
Medical Social Work  Mag Gore, [facility] assessed the patient for acceptance into her group home, [address].  Mag stated she will get back to SW tomorrow on if she can accept the patient.

## 2022-12-05 NOTE — DISCHARGE PLANNING
Case Management Discharge Planning    Admission Date: 11/2/2022  GMLOS: 4.5  ALOS: 33    6-Clicks ADL Score: 6  6-Clicks Mobility Score: 9  PT and/or OT Eval ordered: Yes  Post-acute Referrals Ordered: Yes  Post-acute Choice Obtained: Yes  Has referral(s) been sent to post-acute provider:  Yes      Anticipated Discharge Dispo: Discharge Disposition: D/T to facility providing assisted/supportive care (04)    DME Needed: Yes    DME Ordered: No    Action(s) Taken: Updated Provider/Nurse on Discharge Plan    Escalations Completed: Long Length of Stay Committee     Medically Clear: Yes    Next Steps: follow up with [facility] for acceptance    Barriers to Discharge: Pending Placement    Is the patient up for discharge tomorrow: No    Discussed patient during Long Length of Stay, patient is now on Comfort Care.  Dr Chang will be discussing Hospice with her sister today.   Quant has been requested, will need a COVID prior to admit.     PC to [facility],  [phone],  Luisa stated she has an opening for a female.  Sw provided reason for need of placement, Hospice.  Luisa stated she can come out tomorrow after 5:00 to assess patient for her home.

## 2022-12-05 NOTE — PROGRESS NOTES
Patient was transferred from 603 bed 2 to R315.for comfort level of care report given to  receiving RN kyndra Holstein.

## 2022-12-06 NOTE — ASSESSMENT & PLAN NOTE
Precedes CVA per discussion with sister Karuna    12/7/2022  Continues to feel depressed  Increase escitalopram to 10 mg daily

## 2022-12-06 NOTE — CARE PLAN
The patient is Stable - Low risk of patient condition declining or worsening    Shift Goals  Clinical Goals: rest, comfort  Patient Goals: non-verbal  Family Goals: N/A    Progress made toward(s) clinical / shift goals:    Problem: Optimal Care of the Stroke Patient  Goal: Optimal emergency care for the stroke patient  Outcome: Progressing  Goal: Optimal acute care for the stroke patient  Outcome: Progressing     Problem: Knowledge Deficit - Stroke Education  Goal: Patient's knowledge of stroke and risk factors will improve  Outcome: Progressing     Problem: Psychosocial - Patient Condition  Goal: Patient's ability to verbalize feelings about condition will improve  Outcome: Progressing  Goal: Patient's ability to re-evaluate and adapt role responsibilities will improve  Outcome: Progressing     Problem: Discharge Planning - Stroke  Goal: Ensure Stroke Core Measures are met prior to discharge  Outcome: Progressing  Goal: Patient’s continuum of care needs will be met  Outcome: Progressing     Problem: Neuro Status  Goal: Neuro status will remain stable or improve  Outcome: Progressing     Problem: Respiratory - Stroke Patient  Goal: Patient will achieve/maintain optimum respiratory rate/effort  Outcome: Progressing     Problem: Dysphagia  Goal: Dysphagia will improve  Outcome: Progressing     Problem: Risk for Aspiration  Goal: Patient's risk for aspiration will be absent or decrease  Outcome: Progressing     Problem: Urinary Elimination  Goal: Establish and maintain regular urinary output  Outcome: Progressing     Problem: Bowel Elimination  Goal: Establish and maintain regular bowel function  Outcome: Progressing     Problem: Mobility - Stroke  Goal: Patient's capacity to carry out activities will improve  Outcome: Progressing  Goal: Spasticity will be prevented or improved  Outcome: Progressing  Goal: Subluxation will be prevented or improved  Outcome: Progressing     Problem: Self Care  Goal: Patient will have  the ability to perform ADLs independently or with assistance (bathe, groom, dress, toilet and feed)  Outcome: Progressing     Problem: Knowledge Deficit - Standard  Goal: Patient and family/care givers will demonstrate understanding of plan of care, disease process/condition, diagnostic tests and medications  Outcome: Progressing     Problem: Skin Integrity  Goal: Skin integrity is maintained or improved  Outcome: Progressing     Problem: Fall Risk  Goal: Patient will remain free from falls  Outcome: Progressing     Problem: Pain - Standard  Goal: Alleviation of pain or a reduction in pain to the patient’s comfort goal  Outcome: Progressing     Problem: Hemodynamic Monitoring  Goal: Patient's hemodynamics, fluid balance and neurologic status will be stable or improve  Outcome: Met       Patient is not progressing towards the following goals:

## 2022-12-06 NOTE — PROGRESS NOTES
Different food options offered to patient throughout they day, patient declined. Water offered to patient throughout day, patient declined. Repositioning offered to patient throughout day, patient declined. Patient nods yes and no to answer questions

## 2022-12-06 NOTE — PROGRESS NOTES
Hospital Medicine Daily Progress Note    Date of Service  12/5/2022    Chief Complaint  Jacqueline Webb is a 56 y.o. female admitted 11/2/2022 with altered mental status    Hospital Course  Jacqueline Webb is a 56 y.o. female who presented 11/2/2022 with a history of type 2 diabetes, high cholesterol, vision loss who presented with altered mental status.  ED patient was found to have a left-sided gaze preference, NIH 25.  Subsequent MRI noted with left frontal insular temporal infarct.  Evaluated by neurology recommended continue aspirin, Lipitor and follow-up with outpatient [facility].  PT/OT recommending postacute placement but patient has no insurance or payor source. Case management working with family to arrange discharge to home with family assistance.       Interval Problem Update  11/23: No significant events overnight.  Patient remains nonverbal.  She remains on 2 LPM oxygen via nasal cannula.  Reported respiratory treatments as well as PEP therapy in order to wean off her oxygen.  NT proBNP is mildly elevated at 675.  On starting furosemide 20 mg by mouth daily.  Patient can be discharged to home if she is able to wean off of oxygen.  Blood glucose have been stable glargine insulin 10 units daily.  Patient continues on a puréed diet.  Also bolus feeds through the PEG tube.    11/24: Patient becoming more lethargic today.  Blood pressures dropping to 77/46 today.  Furosemide discontinued.  Given LR 1 L bolus.  Started on midodrine 10 mg 3 times a day.  White count mildly increased at 14.3.  Procalcitonin yesterday was 0.1.  No signs of active infection.  Repeat procalcitonin level hospitalist urinalysis has been ordered.  Currently on 1 LPM oxygen mask.    11/25: Patient is more awake this morning.  Blood pressures remaining stable in the 100s/70s on midodrine 10 mg 3 times a day as IV fluids.  I have discontinued patient's IV fluids as well as decreased her midodrine to 5 mg 3 times a day.   White count is still elevated at 12.7.  Procalcitonin yesterday was 0.1.  There is no signs of infection but there is a concern about possibility of UTI.  We are obtaining urinalysis through straight catheterization.  She is currently on 1 LPM oxygen via oxygen mask.  Blood glucose in the 130s to 170s.  Glargine insulin increased to 11 units from 10 units.  Patient refusing to work with PT and OT today.    11/28: No significant events overnight. Continuing to require 2-3 LPM oxygen via mask.  IV fluids have been discontinued.  Continues on midodrine 5 mg TID.   Continues on glargine insuline 11 units with regular insulin sliding scale.  Plan to discharge with patient portable oxygen concentrator in next couple of days to the family.  Patient is on third day of ceftriaxone, which was started due to positive urinalysis and leukocytosis.  Urine cultures is growing pansensitive E. coli.       11/30: MIGEL. She is able to open her eyes, nod/shake head appropriately to questions, and follow commands. She is unable to speak when prompted. She indicates no pain. She nods for shortness of breath. We discussed that she had a stroke. I advised that she will need care for ADLs for the foreseeable future. She shook her head when asked if she wanted family to care for her, for paid caregivers at a group home, and shrugged when advised that to return home she would not have a caregiver. She physically requires significant assistance with ADLs but retains cognitive capacity to indicate preferences in her care. Certificate of needs completed for consideration of medical and financial guardian due to considerable assistance required to execute needs and desires.    12/4: She refused TF yesterday. I met with her at bedside. She awoke and opened her eyes to make eye contact with me. She shook her head no when asked if she had pain or shortness of breath. She nodded when asked if she was tired. I asked if she was ready to die, for which  she shook her head. I advised that given her severe CVA and uncertain recovery, if she continues to decline PT/OT and TF she will remain bedbound and develop decubitus infections, which will cause her death. I asked if she would prefer to focus only on remaining comfort and be allowed to pass when it is her time, for which she nodded. Transitioned to comfort-only measures. POC communicated with primary RN. Left  for sister Karuna and will call again tomorrow.    12/5: Revisited with her today. RN indicated malodor and purulence from PEG site, which was not apparent on my exam. Mrs. Webb shook her head no when asked about abdominal pain or malodor. Asked if it was nice to not have a roommate and she nodded. Asked again if she wishes to use her PEG for feeding. She shook her head. Asked if she was comfortable and she nodded yes. Again asked if she only wants us to make her comfortable and she nodded yes. Shook her head when asked if she was short of breath. When asked if she was cold she shook her head.    Hospice consultation discussed with Hospice Dr. Ca, who will meet with her to get his own impression of capacity. She qualifies, and would be able to enroll if NO is willing to consent to hospice. Discussed with her sister, Karuna, who is not surprised that Jacqueline is communicating this. Karuna reports that Jacqueline has been depressed since her cancer treatment, loss of her job at [employer], and her cat disappearing. They had discusses as sisters in the past that they would not want to be dependent on care if having a major illness. Karuna inquired about how we came to the decision to transition to comfort-only measures if Jacqueline is nonverbal, for which I advised that she has consistently indicated she does not wish for therapy, food, nor water. She has been asked yes/no questions regarding therapy and food after being counseled that without these she will likely remain bedbound and develop infections or  aspiration which will cause her to die. Karuna agreed with this reasoning and comfort measures. She will discuss hospice with the hospice RN and Dr. Ca tomorrow.    I have discussed this patient's plan of care and discharge plan with the Complex Discharge Planning Committee.    Consultants/Specialty  neurology and hospice    Code Status  Comfort Care/DNR    Disposition  Patient is medically cleared for discharge.   Anticipate discharge to to hospice.  I have placed the appropriate orders for post-discharge needs.    Review of Systems  Review of Systems   Unable to perform ROS: Patient nonverbal   Respiratory:  Negative for shortness of breath.    Gastrointestinal:  Negative for abdominal pain.      Physical Exam  Temp:  [36.1 °C (96.9 °F)-36.2 °C (97.2 °F)] 36.2 °C (97.2 °F)  Pulse:  [87-94] 94  Resp:  [16] 16  BP: (90-93)/(60-62) 93/62  SpO2:  [99 %] 99 %    Physical Exam  Vitals and nursing note reviewed.   Constitutional:       General: She is not in acute distress.     Appearance: She is ill-appearing.      Comments: Somnolent but arousable   HENT:      Head: Normocephalic.      Nose: Nose normal.      Mouth/Throat:      Mouth: Mucous membranes are dry.   Eyes:      General: No scleral icterus.     Conjunctiva/sclera: Conjunctivae normal.   Cardiovascular:      Rate and Rhythm: Normal rate and regular rhythm.      Pulses: Normal pulses.      Heart sounds: Normal heart sounds. No murmur heard.    No friction rub. No gallop.   Pulmonary:      Effort: Pulmonary effort is normal. No respiratory distress.      Breath sounds: Normal breath sounds. No wheezing, rhonchi or rales.   Chest:   Breasts:     Left: Absent. No swelling.      Comments: Left mastectomy incision well-healed  Abdominal:      General: Abdomen is flat. Bowel sounds are normal. There is no distension.      Palpations: Abdomen is soft.      Tenderness: There is no abdominal tenderness. There is no guarding or rebound.      Comments: +LLQ PEG -  nonerythematous, nontender, nonpurulent.   Genitourinary:     Comments: No arroyo  Musculoskeletal:      Cervical back: Neck supple.   Skin:     General: Skin is dry.      Coloration: Skin is pale. Skin is not cyanotic.   Neurological:      Mental Status: She is alert.      Comments: Profound expressive aphasia   Psychiatric:         Attention and Perception: Attention normal.         Mood and Affect: Affect is blunt.         Behavior: Behavior is slowed. Behavior is cooperative.      Comments: Unable to fully assess - nonverbal       Fluids  No intake or output data in the 24 hours ending 12/05/22 1901      Laboratory                                    Imaging  DX-CHEST-PORTABLE (1 VIEW)   Final Result      Decreasing lung volumes and new perihilar atelectasis favored over consolidation      CT-CHEST (THORAX) WITH   Final Result      1.  No evidence of pulmonary nodule. Questioned pulmonary nodule on abdominal film due to callus formation from subacute/chronic right anterior fourth rib fracture.      2.  Porcelain gallbladder.      Fleischner Society pulmonary nodule recommendations:   Not Applicable         DX-ABDOMEN FOR TUBE PLACEMENT   Final Result      Enteric tube with catheter tip in the left upper quadrant consistent with intragastric location.      MR-BRAIN-W/O   Final Result         Acute infarct in the left frontal, insular and left medial temporal region. Small acute infarct also noted in the left internal capsule anterior limb and adjacent basal ganglia.      Occlusion of the left distal cervical and petrous ICA.      Remote right parietal infarct.      Age-related volume loss and chronic microvascular ischemic changes.      DX-ABDOMEN FOR TUBE PLACEMENT   Final Result         1.  Nonspecific bowel gas pattern in the upper abdomen.   2.  Nasogastric tube tip terminates overlying the expected location of the pylorus or first duodenal segment.   3.  Atherosclerosis      DX-ABDOMEN FOR TUBE PLACEMENT    Final Result         1.  Nonspecific bowel gas pattern in the upper abdomen.   2.  Nasogastric tube with side-port at the gastroesophageal junction, recommend advancement.   3.  Right midlung nodular density, recommend follow-up CT chest for further characterization.   4.  Atherosclerosis      These findings were discussed with the patient's clinician, Dr. Puente, on 11/4/2022 7:25 AM.      EC-ECHOCARDIOGRAM COMPLETE W/O CONT   Final Result      DX-CHEST-PORTABLE (1 VIEW)   Final Result      No acute cardiac or pulmonary abnormalities are identified.      CT-CTA NECK WITH & W/O-POST PROCESSING   Final Result      1.  Occluded LEFT extracranial internal carotid artery. This could be due to thromboembolic disease or dissection.   2.  Estimated 50-75% stenosis of the proximal RIGHT internal carotid artery   3.  Thyroid nodules      CT-CEREBRAL PERFUSION ANALYSIS   Final Result      1.  Moderate sized completed area of infarction noted in the left frontal parasylvian region.      CT-CTA HEAD WITH & W/O-POST PROCESS   Final Result      1.  Occlusion of the imaged extracranial LEFT internal carotid artery through the cavernous segment with reconstitution of the supraclinoid segment   2.  LEFT M3 branch occlusion            Findings were communicated with and acknowledged by Dr. Sultana via [employer] on 11/2/2022 2:34 PM.      CT-HEAD W/O   Final Result      1.  Moderate sized acute area of infarction involving the left frontal parasylvian region.      2.  Smaller encephalomalacia thinning to the cortex in the right parietal-occipital region.      3.  Mild age-related cerebral atrophy.           Assessment/Plan  * Acute CVA (cerebrovascular accident) (HCC)- (present on admission)  Assessment & Plan  MRI noted with left frontal insular temporal infarct  Evaluated by neurology with recommendation for Zio patch on discharge  PT/OT recommending postacute placement no accepting SNF discussed with case management who will  follow up with family about applying for Medicaid   Family financial / medical guardianship in progress  Transitioned to comfort care per communicated preference to focus only on remaining comfort    Comfort measures only status  Assessment & Plan  Morphine PRN dyspnea, pain  Lorazepam PRN anxiety  Lactulose and bisacodyl PRN constipation  Refusing PEG fluids and TF - not indicated for EOL care  Hospice evaluation - discussed capacity assessment with [facility] Dr. Ca, confirmed qualifies and can enroll if NOK able to consent  Indicated preference for comfort only is consistent with prior expressed wishes per sister Karuna   referral sent, quantiferon in process    Moderate episode of recurrent major depressive disorder (HCC)- (present on admission)  Assessment & Plan  Precedes CVA per discussion with sister Karuna  Continue escitalopram    Acute cystitis without hematuria- (present on admission)  Assessment & Plan  Completed ceftriaxone 3-day course for pan-susceptible E coli    Leukocytosis- (present on admission)  Assessment & Plan  Persistent despite treatment for cystitis  Likely due to CVA and subclinical aspiration  Comfort care    Hypotensive episode- (present on admission)  Assessment & Plan  Likely neurogenic from CVA  Midodrine discontinued per patient preference  Comfort care    Acute ischemic left ICA stroke (HCC)- (present on admission)  Assessment & Plan  As per CT angiogram.  Because of patient's stroke.  Comfort care    Weakness of right upper extremity- (present on admission)  Assessment & Plan  Secondary to stroke.  Declining further PT/OT  Comfort care    Acute hypoxemic respiratory failure (HCC)- (present on admission)  Assessment & Plan  Likely due to aspiration pneumonitis and atelectasis from CVA causing poor mobility    Oropharyngeal dysphagia- (present on admission)  Assessment & Plan  Status post stroke  Speech following  S/p peg  Patient passed FEES exam.  Mild oropharyngeal  dysphagia as per exam.  Diet per patient preference on Comfort Care    Failure to thrive in adult- (present on admission)  Assessment & Plan  Discharge disposition is difficult.  Patient does not have insurance.  Comfort care    Thyroid nodule  Assessment & Plan  Comfort care    Carotid stenosis- (present on admission)  Assessment & Plan  Comfort care    Vision loss- (present on admission)  Assessment & Plan  Will need outpatient ophthalmology evaluation    HLD (hyperlipidemia)- (present on admission)  Assessment & Plan  Comfort care    Type 2 diabetes mellitus with hyperglycemia, without long-term current use of insulin (HCC)- (present on admission)  Assessment & Plan   Hemoglobin A1c 11.4  Blood glucose currently controlled  Comfort care       VTE prophylaxis: pharmacologic prophylaxis contraindicated due to comfort measures    I have performed a physical exam and reviewed and updated ROS and Plan today (12/5/2022). In review of yesterday's note (12/4/2022), there are no changes except as documented above.

## 2022-12-06 NOTE — HOSPICE
Call to Sister Karuna to arrange an appointment to discuss hospice services, no answer, message left.

## 2022-12-06 NOTE — ASSESSMENT & PLAN NOTE
Morphine PRN dyspnea, pain  Lorazepam PRN anxiety  Lactulose and bisacodyl PRN constipation  Refusing PEG fluids and TF - not indicated for EOL care  Hospice evaluation - discussed capacity assessment with [facility] Dr. Ca, confirmed qualifies and can enroll if NOK able to consent  Indicated preference for comfort only is consistent with prior expressed wishes per sister Karuna   referral sent, quantiferon in process

## 2022-12-07 PROBLEM — F32.A DEPRESSION: Status: ACTIVE | Noted: 2022-01-01

## 2022-12-07 NOTE — CARE PLAN
Problem: Skin Integrity  Goal: Skin integrity is maintained or improved  Outcome: Progressing     Problem: Fall Risk  Goal: Patient will remain free from falls  Outcome: Progressing     The patient is Watcher - Medium risk of patient condition declining or worsening    Shift Goals  Clinical Goals: rest, comfort  Patient Goals: non-verbal  Family Goals: N/A    Progress made toward(s) clinical / shift goals:  Pt turns self side to side    Patient is not progressing towards the following goals:

## 2022-12-07 NOTE — DISCHARGE PLANNING
"Medical Social Work  PC to patient's sister Karuna [phone]; SERA called to see if she has followed up on the group homes that where provided to her.  Karuna stated she has an appointment with one of them today, and will be calling [facility] this morning.    Karuna asked if patient stay at [facility] until next week.  SERA asked why, Karuna asked Sera if he was aware that patient's birthday is on [birth year] and that is the day we are wanting to discharge her.  Karuna told SERA that this is very wrong and hurtful to the patient and her family.  SERA stated that patient is medically clear to discharge the hospital and does not have a need to continue to be here.  Karuna then asked what kind of credentials SERA has, \"are you a licensed Social Worker.\"  SERA stated yes and has been a licensed Social Worker for over 30 years.  SERA stated he will present her concerns, but patient needs to be discharged Friday.  SERA stated he will follow up with her on Thursday.     "

## 2022-12-07 NOTE — CARE PLAN
The patient is Stable - Low risk of patient condition declining or worsening    Shift Goals  Clinical Goals: rest, comfort  Patient Goals: non-verbal  Family Goals: n/a    Progress made toward(s) clinical / shift goals:    Problem: Knowledge Deficit - Stroke Education  Goal: Patient's knowledge of stroke and risk factors will improve  Outcome: Progressing     Problem: Psychosocial - Patient Condition  Goal: Patient's ability to verbalize feelings about condition will improve  Outcome: Progressing  Goal: Patient's ability to re-evaluate and adapt role responsibilities will improve  Outcome: Progressing     Problem: Dysphagia  Goal: Dysphagia will improve  Outcome: Progressing     Problem: Risk for Aspiration  Goal: Patient's risk for aspiration will be absent or decrease  Outcome: Progressing     Problem: Urinary Elimination  Goal: Establish and maintain regular urinary output  Outcome: Progressing     Problem: Bowel Elimination  Goal: Establish and maintain regular bowel function  Outcome: Progressing     Problem: Self Care  Goal: Patient will have the ability to perform ADLs independently or with assistance (bathe, groom, dress, toilet and feed)  Outcome: Progressing     Problem: Knowledge Deficit - Standard  Goal: Patient and family/care givers will demonstrate understanding of plan of care, disease process/condition, diagnostic tests and medications  Outcome: Progressing     Problem: Skin Integrity  Goal: Skin integrity is maintained or improved  Outcome: Progressing     Problem: Fall Risk  Goal: Patient will remain free from falls  Outcome: Progressing     Problem: Pain - Standard  Goal: Alleviation of pain or a reduction in pain to the patient’s comfort goal  Outcome: Progressing     Problem: Optimal Care of the Stroke Patient  Goal: Optimal emergency care for the stroke patient  Outcome: Met  Goal: Optimal acute care for the stroke patient  Outcome: Met     Problem: Discharge Planning - Stroke  Goal: Ensure  Stroke Core Measures are met prior to discharge  Outcome: Met  Goal: Patient’s continuum of care needs will be met  Outcome: Met     Problem: Neuro Status  Goal: Neuro status will remain stable or improve  Outcome: Met     Problem: Respiratory - Stroke Patient  Goal: Patient will achieve/maintain optimum respiratory rate/effort  Outcome: Met     Problem: Mobility - Stroke  Goal: Patient's capacity to carry out activities will improve  Outcome: Met  Goal: Spasticity will be prevented or improved  Outcome: Met  Goal: Subluxation will be prevented or improved  Outcome: Met       Patient is not progressing towards the following goals:

## 2022-12-07 NOTE — PROGRESS NOTES
Hospital Medicine Daily Progress Note    Date of Service  12/7/2022    Chief Complaint  Jacqueline Webb is a 56 y.o. female admitted 11/2/2022 with altered mental status    Hospital Course  Jacqueline Webb is a 56 y.o. female who presented 11/2/2022 with a history of type 2 diabetes, high cholesterol, vision loss who presented with altered mental status.  ED patient was found to have a left-sided gaze preference, NIH 25.  Subsequent MRI noted with left frontal insular temporal infarct.  Evaluated by neurology recommended continue aspirin, Lipitor and follow-up with outpatient [facility].  PT/OT recommending postacute placement but patient has no insurance or payor source. Case management working with family to arrange discharge to home with family assistance.       Interval Problem Update  11/23: No significant events overnight.  Patient remains nonverbal.  She remains on 2 LPM oxygen via nasal cannula.  Reported respiratory treatments as well as PEP therapy in order to wean off her oxygen.  NT proBNP is mildly elevated at 675.  On starting furosemide 20 mg by mouth daily.  Patient can be discharged to home if she is able to wean off of oxygen.  Blood glucose have been stable glargine insulin 10 units daily.  Patient continues on a puréed diet.  Also bolus feeds through the PEG tube.    11/24: Patient becoming more lethargic today.  Blood pressures dropping to 77/46 today.  Furosemide discontinued.  Given LR 1 L bolus.  Started on midodrine 10 mg 3 times a day.  White count mildly increased at 14.3.  Procalcitonin yesterday was 0.1.  No signs of active infection.  Repeat procalcitonin level hospitalist urinalysis has been ordered.  Currently on 1 LPM oxygen mask.    11/25: Patient is more awake this morning.  Blood pressures remaining stable in the 100s/70s on midodrine 10 mg 3 times a day as IV fluids.  I have discontinued patient's IV fluids as well as decreased her midodrine to 5 mg 3 times a day.   White count is still elevated at 12.7.  Procalcitonin yesterday was 0.1.  There is no signs of infection but there is a concern about possibility of UTI.  We are obtaining urinalysis through straight catheterization.  She is currently on 1 LPM oxygen via oxygen mask.  Blood glucose in the 130s to 170s.  Glargine insulin increased to 11 units from 10 units.  Patient refusing to work with PT and OT today.    11/28: No significant events overnight. Continuing to require 2-3 LPM oxygen via mask.  IV fluids have been discontinued.  Continues on midodrine 5 mg TID.   Continues on glargine insuline 11 units with regular insulin sliding scale.  Plan to discharge with patient portable oxygen concentrator in next couple of days to the family.  Patient is on third day of ceftriaxone, which was started due to positive urinalysis and leukocytosis.  Urine cultures is growing pansensitive E. coli.       11/30: MIGEL. She is able to open her eyes, nod/shake head appropriately to questions, and follow commands. She is unable to speak when prompted. She indicates no pain. She nods for shortness of breath. We discussed that she had a stroke. I advised that she will need care for ADLs for the foreseeable future. She shook her head when asked if she wanted family to care for her, for paid caregivers at a group home, and shrugged when advised that to return home she would not have a caregiver. She physically requires significant assistance with ADLs but retains cognitive capacity to indicate preferences in her care. Certificate of needs completed for consideration of medical and financial guardian due to considerable assistance required to execute needs and desires.    12/4: She refused TF yesterday. I met with her at bedside. She awoke and opened her eyes to make eye contact with me. She shook her head no when asked if she had pain or shortness of breath. She nodded when asked if she was tired. I asked if she was ready to die, for which  she shook her head. I advised that given her severe CVA and uncertain recovery, if she continues to decline PT/OT and TF she will remain bedbound and develop decubitus infections, which will cause her death. I asked if she would prefer to focus only on remaining comfort and be allowed to pass when it is her time, for which she nodded. Transitioned to comfort-only measures. POC communicated with primary RN. Left  for sister Karuna and will call again tomorrow.    12/5: Revisited with her today. RN indicated malodor and purulence from PEG site, which was not apparent on my exam. Mrs. Webb shook her head no when asked about abdominal pain or malodor. Asked if it was nice to not have a roommate and she nodded. Asked again if she wishes to use her PEG for feeding. She shook her head. Asked if she was comfortable and she nodded yes. Again asked if she only wants us to make her comfortable and she nodded yes. Shook her head when asked if she was short of breath. When asked if she was cold she shook her head.    Hospice consultation discussed with Hospice Dr. Ca, who will meet with her to get his own impression of capacity. She qualifies, and would be able to enroll if NO is willing to consent to hospice. Discussed with her sister, Karuna, who is not surprised that Jacqueline is communicating this. Karuna reports that Jacqueline has been depressed since her cancer treatment, loss of her job at [employer], and her cat disappearing. They had discusses as sisters in the past that they would not want to be dependent on care if having a major illness. Karuna inquired about how we came to the decision to transition to comfort-only measures if Jacqueline is nonverbal, for which I advised that she has consistently indicated she does not wish for therapy, food, nor water. She has been asked yes/no questions regarding therapy and food after being counseled that without these she will likely remain bedbound and develop infections or  aspiration which will cause her to die. Karuna agreed with this reasoning and comfort measures. She will discuss hospice with the hospice RN and Dr. Ca tomorrow.    12/7: No significant events overnight.  Patient continues on 3 LPM oxygen via nasal cannula.  I confirmed with the patient that she wanted to continue comfort care measures without aggressive medical intervention.  She denies any current pain.  She does report feeling depressed.  I have increased patient's escitalopram to 10 mg daily.  Patient's states that she does not want to go to a group home.  However, she understands that this might be her on any choice for discharge.  Patient indicates that discharged to her own home is not a possibility.  Patient is medically stable for discharge to a group home.  Sister is looking at group homes and will be calling [facility] today.      I have discussed this patient's plan of care and discharge plan with the Complex Discharge Planning Committee.    Consultants/Specialty  neurology and hospice    Code Status  Comfort Care/DNR    Disposition  Patient is medically cleared for discharge.   Anticipate discharge to to hospice.  I have placed the appropriate orders for post-discharge needs.    Review of Systems  Review of Systems   Unable to perform ROS: Patient nonverbal   Respiratory:  Negative for shortness of breath.    Gastrointestinal:  Negative for abdominal pain.   Musculoskeletal:  Negative for joint pain and neck pain.   Neurological:  Positive for weakness. Negative for headaches.      Physical Exam  Temp:  [36.7 °C (98.1 °F)] 36.7 °C (98.1 °F)  Pulse:  [104] 104  Resp:  [15] 15  BP: (117)/(64) 117/64  SpO2:  [90 %] 90 %    Physical Exam  Vitals and nursing note reviewed.   Constitutional:       General: She is not in acute distress.     Appearance: She is ill-appearing.   HENT:      Head: Normocephalic.      Nose: Nose normal.      Mouth/Throat:      Mouth: Mucous membranes are moist.    Eyes:      General: No scleral icterus.     Conjunctiva/sclera: Conjunctivae normal.   Cardiovascular:      Rate and Rhythm: Normal rate and regular rhythm.      Pulses: Normal pulses.      Heart sounds: Normal heart sounds. No murmur heard.    No friction rub. No gallop.   Pulmonary:      Effort: Pulmonary effort is normal. No respiratory distress.      Breath sounds: Normal breath sounds. No wheezing, rhonchi or rales.   Chest:   Breasts:     Left: Absent. No swelling.      Comments: Left mastectomy incision well-healed  Abdominal:      General: Abdomen is flat. Bowel sounds are normal. There is no distension.      Palpations: Abdomen is soft.      Tenderness: There is no abdominal tenderness. There is no guarding or rebound.      Comments: +LLQ PEG - nonerythematous, nontender, nonpurulent.   Genitourinary:     Comments: No arroyo  Musculoskeletal:      Cervical back: Neck supple.   Skin:     General: Skin is dry.      Coloration: Skin is pale. Skin is not cyanotic.   Neurological:      Mental Status: She is alert.      Motor: Weakness (Stable right upper extremity weakness 0/5) present.      Comments: Profound expressive aphasia.  Moving all extremities to command except for the right upper extremity.   Psychiatric:         Attention and Perception: Attention normal.         Mood and Affect: Affect is blunt.         Behavior: Behavior is slowed. Behavior is cooperative.      Comments: Unable to fully assess - nonverbal       Fluids    Intake/Output Summary (Last 24 hours) at 12/7/2022 1132  Last data filed at 12/7/2022 0500  Gross per 24 hour   Intake no documentation   Output 400 ml   Net -400 ml         Laboratory                                    Imaging  DX-CHEST-PORTABLE (1 VIEW)   Final Result      Decreasing lung volumes and new perihilar atelectasis favored over consolidation      CT-CHEST (THORAX) WITH   Final Result      1.  No evidence of pulmonary nodule. Questioned pulmonary nodule on abdominal  film due to callus formation from subacute/chronic right anterior fourth rib fracture.      2.  Porcelain gallbladder.      Fleischner Society pulmonary nodule recommendations:   Not Applicable         DX-ABDOMEN FOR TUBE PLACEMENT   Final Result      Enteric tube with catheter tip in the left upper quadrant consistent with intragastric location.      MR-BRAIN-W/O   Final Result         Acute infarct in the left frontal, insular and left medial temporal region. Small acute infarct also noted in the left internal capsule anterior limb and adjacent basal ganglia.      Occlusion of the left distal cervical and petrous ICA.      Remote right parietal infarct.      Age-related volume loss and chronic microvascular ischemic changes.      DX-ABDOMEN FOR TUBE PLACEMENT   Final Result         1.  Nonspecific bowel gas pattern in the upper abdomen.   2.  Nasogastric tube tip terminates overlying the expected location of the pylorus or first duodenal segment.   3.  Atherosclerosis      DX-ABDOMEN FOR TUBE PLACEMENT   Final Result         1.  Nonspecific bowel gas pattern in the upper abdomen.   2.  Nasogastric tube with side-port at the gastroesophageal junction, recommend advancement.   3.  Right midlung nodular density, recommend follow-up CT chest for further characterization.   4.  Atherosclerosis      These findings were discussed with the patient's clinician, Dr. uPente, on 11/4/2022 7:25 AM.      EC-ECHOCARDIOGRAM COMPLETE W/O CONT   Final Result      DX-CHEST-PORTABLE (1 VIEW)   Final Result      No acute cardiac or pulmonary abnormalities are identified.      CT-CTA NECK WITH & W/O-POST PROCESSING   Final Result      1.  Occluded LEFT extracranial internal carotid artery. This could be due to thromboembolic disease or dissection.   2.  Estimated 50-75% stenosis of the proximal RIGHT internal carotid artery   3.  Thyroid nodules      CT-CEREBRAL PERFUSION ANALYSIS   Final Result      1.  Moderate sized completed area of  infarction noted in the left frontal parasylvian region.      CT-CTA HEAD WITH & W/O-POST PROCESS   Final Result      1.  Occlusion of the imaged extracranial LEFT internal carotid artery through the cavernous segment with reconstitution of the supraclinoid segment   2.  LEFT M3 branch occlusion            Findings were communicated with and acknowledged by Dr. Sultana via Voalte Me on 11/2/2022 2:34 PM.      CT-HEAD W/O   Final Result      1.  Moderate sized acute area of infarction involving the left frontal parasylvian region.      2.  Smaller encephalomalacia thinning to the cortex in the right parietal-occipital region.      3.  Mild age-related cerebral atrophy.           Assessment/Plan  * Acute CVA (cerebrovascular accident) (HCC)- (present on admission)  Assessment & Plan  MRI noted with left frontal insular temporal infarct  Evaluated by neurology with recommendation for Zio patch on discharge  PT/OT recommending postacute placement no accepting SNF discussed with case management who will follow up with family about applying for Medicaid   Family financial / medical guardianship in progress  Transitioned to comfort care per communicated preference to focus only on remaining comfort    Moderate episode of recurrent major depressive disorder (HCC)- (present on admission)  Assessment & Plan  Precedes CVA per discussion with sister Karuna    12/7/2022  Continues to feel depressed  Increase escitalopram to 10 mg daily    Acute cystitis without hematuria- (present on admission)  Assessment & Plan  Completed ceftriaxone 3-day course for pan-susceptible E coli    Leukocytosis- (present on admission)  Assessment & Plan  Persistent despite treatment for cystitis  Likely due to CVA and subclinical aspiration  Comfort care    Hypotensive episode- (present on admission)  Assessment & Plan  Likely neurogenic from CVA  Midodrine discontinued per patient preference  Comfort care    Acute hypoxemic respiratory failure  (HCC)- (present on admission)  Assessment & Plan  Likely due to aspiration pneumonitis and atelectasis from CVA causing poor mobility    Oropharyngeal dysphagia- (present on admission)  Assessment & Plan  Status post stroke  Speech following  S/p peg  Patient passed FEES exam.  Mild oropharyngeal dysphagia as per exam.  Diet per patient preference on Comfort Care    Acute ischemic left ICA stroke (HCC)- (present on admission)  Assessment & Plan  As per CT angiogram.  Because of patient's stroke.  Comfort care    Weakness of right upper extremity- (present on admission)  Assessment & Plan  Secondary to stroke.  Declining further PT/OT  Comfort care    Failure to thrive in adult- (present on admission)  Assessment & Plan  Discharge disposition is difficult.  Patient does not have insurance.  Comfort care    Type 2 diabetes mellitus with hyperglycemia, without long-term current use of insulin ([facility])- (present on admission)  Assessment & Plan   Hemoglobin A1c 11.4  Blood glucose currently controlled  Comfort care      Comfort measures only status  Assessment & Plan  Morphine PRN dyspnea, pain  Lorazepam PRN anxiety  Lactulose and bisacodyl PRN constipation  Refusing PEG fluids and TF - not indicated for EOL care  Hospice evaluation - discussed capacity assessment with [facility] aC, confirmed qualifies and can enroll if NOK able to consent  Indicated preference for comfort only is consistent with prior expressed wishes per sister Karuna   referral sent, quantiferon in process    Thyroid nodule  Assessment & Plan  Comfort care    Carotid stenosis- (present on admission)  Assessment & Plan  Comfort care    Vision loss- (present on admission)  Assessment & Plan  Will need outpatient ophthalmology evaluation    HLD (hyperlipidemia)- (present on admission)  Assessment & Plan  Comfort care     VTE prophylaxis: pharmacologic prophylaxis contraindicated due to comfort measures    I have performed a physical exam  and reviewed and updated ROS and Plan today (12/7/2022). In review of yesterday's note (12/6/2022), there are no changes except as documented above.

## 2022-12-08 NOTE — HOSPICE
In depth discussion with patient's sister, Karuna, about hospice services and her sister's pending discharge. Karuna asked questions about local group homes and what they could provide. I reassured her that our team has worked with [facility] many times, and that they provide quality care at that facility. Reviewed hospice disciplines and who would be available to help her sister after discharge.    [facility] will round with Karuna and family on Friday morning. Karuna is very hesitant to make a decision with input from their other sister, who arrives tomorrow night. Karuna expressed feeling rushed, and I encouraged her to discuss her concerns and questions with the hospital team.

## 2022-12-08 NOTE — CARE PLAN
The patient is Watcher - Medium risk of patient condition declining or worsening    Shift Goals  Clinical Goals: comfort  Patient Goals: comfort  Family Goals: n/a    Progress made toward(s) clinical / shift goals:      Problem: Skin Integrity  Goal: Skin integrity is maintained or improved  Outcome: Progressing     Problem: Fall Risk  Goal: Patient will remain free from falls  Outcome: Progressing     Problem: Pain - Standard  Goal: Alleviation of pain or a reduction in pain to the patient’s comfort goal  Outcome: Progressing     Patient is nonverbal but able to shake and nod her head. Pt denies any pain at this time and declines need for water by shaking her head no. Purewick in place, pt able to make frequent movements in bed.

## 2022-12-08 NOTE — CARE PLAN
The patient is Watcher - Medium risk of patient condition declining or worsening    Shift Goals  Clinical Goals: Pt will remain comfortable throughout shift  Patient Goals: DALE  Family Goals: n/a    Progress made toward(s) clinical / shift goals:        Problem: Pain - Standard  Goal: Alleviation of pain or a reduction in pain to the patient’s comfort goal  Outcome: Progressing  Note: Pt has remained comfortable throughout the shift  PRN roxanol available if needed       Patient is not progressing towards the following goals:

## 2022-12-08 NOTE — CARE PLAN
Problem: Fall Risk  Goal: Patient will remain free from falls  Outcome: Progressing     Problem: Pain - Standard  Goal: Alleviation of pain or a reduction in pain to the patient’s comfort goal  Outcome: Progressing     The patient is Watcher - Medium risk of patient condition declining or worsening    Shift Goals  Clinical Goals: comfort  Patient Goals: comfort  Family Goals: n/a    Progress made toward(s) clinical / shift goals:  Pt denies pain at this time.    Patient is not progressing towards the following goals:

## 2022-12-09 PROBLEM — G93.6 CEREBRAL EDEMA (HCC): Status: ACTIVE | Noted: 2022-01-01

## 2022-12-09 PROBLEM — J81.0 ACUTE PULMONARY EDEMA (HCC): Status: ACTIVE | Noted: 2022-01-01

## 2022-12-09 PROBLEM — I95.9 HYPOTENSIVE EPISODE: Status: RESOLVED | Noted: 2022-01-01 | Resolved: 2022-01-01

## 2022-12-09 PROBLEM — Z66 DNR (DO NOT RESUSCITATE): Status: ACTIVE | Noted: 2022-01-01

## 2022-12-09 PROBLEM — N30.00 ACUTE CYSTITIS WITHOUT HEMATURIA: Status: RESOLVED | Noted: 2022-01-01 | Resolved: 2022-01-01

## 2022-12-09 PROBLEM — F33.1 MODERATE EPISODE OF RECURRENT MAJOR DEPRESSIVE DISORDER (HCC): Status: RESOLVED | Noted: 2022-01-01 | Resolved: 2022-01-01

## 2022-12-09 NOTE — PROGRESS NOTES
Discussed patient with Sánchez Briggs RN. Patient admitted to community hospice at this time for primary dx cerebrovascular accident with dysphagia. MSIR and Lorazepam ordered for comfort.

## 2022-12-09 NOTE — DISCHARGE PLANNING
Medical Social Work  PC to Mag at [place], [phone], SW told that the patient's sister did call and come by her home in [place].  Family is okay with sending patient to her home on Friday afternoon.

## 2022-12-09 NOTE — DISCHARGE PLANNING
DC Transport Scheduled    Received request at: 1311 on 12/9/2022    Transport Company Scheduled:  [employer]  Spoke with CARMEN at [facility] to schedule transport.  [facility] Trip #: [id number]    Scheduled Date: 12/9/2022  Scheduled Time: 1600    Destination: GROUP HOME AT [address] [place]    Notified care team of scheduled transport via Voalte.     If there are any changes needed to the DC transportation scheduled, please contact [facility] at ext. [phone] between the hours of 1659-3116 Mon-Fri. If outside those hours, contact the ED Case Manager at ext. [phone].

## 2022-12-09 NOTE — CARE PLAN
The patient is Stable - Low risk of patient condition declining or worsening    Shift Goals  Clinical Goals: comfort  Patient Goals: comfort  Family Goals: n/a    Progress made toward(s) clinical / shift goals:      Problem: Fall Risk  Goal: Patient will remain free from falls  Outcome: Progressing     Problem: Psychosocial - Comfort Care  Goal: Patient's level of anxiety will decrease  Outcome: Progressing  Goal: Patient and family will demonstrate ability to cope with life altering diagnosis and/or procedure  Outcome: Progressing  Goal: Privacy will be maintained for patient and family  Outcome: Progressing     Problem: Respiratory - Comfort Care  Goal: Patient's respiratory function will be supported  Outcome: Progressing    Patient is nonverbal but can nod and shake her head. Pt nods her head when asked if she is uncomfortable but declines to be repositioned and declines need for pain medication. Pt on 2 L nasal cannula for comfort. Pt often seen resting with relaxed facial expression and eyes closed and does not appear to be in any distress.

## 2022-12-09 NOTE — DISCHARGE SUMMARY
Discharge Summary    CHIEF COMPLAINT ON ADMISSION  Chief Complaint   Patient presents with    Altered Mental Status     Pt found by family this AM with difficulty speaking, pt went to the bathroom and was later found by family in the bathroom with altered mental status not following commands, no signs of trauma, no signs of substance or ETOH on scene per EMS. Hx DM, blood sugar 215       Reason for Admission  Stroke     Admission Date  11/2/2022    CODE STATUS  Comfort Care/DNR    HPI & HOSPITAL COURSE  Jacqueline Webb is a 56 y.o. female who presented 11/2/2022 with altered mental status, speech difficulties, and left gaze preference.  This is a pleasant woman with a history of type 2 diabetes, high cholesterol, and vision loss.  On presentation, she had a NIH stroke score of 25.  CT angiogram showed occlusion of the left internal carotid artery through the cavernous segment with reconstitution of the supraclinoid segment along with a left M3 branch occlusion.  Patient was deemed not to be a candidate for thrombolytic therapy.  Subsequent MRI noted with left frontal insular temporal infarct secondary to occlusion of left carotid artery evaluated by neurology recommended continue aspirin and atorvastatin and follow-up with outpatient [facility].  Patient was evaluated by occupational physical, and speech therapy.  Postacute placement was recommended.      Gastroenterology was consulted for her dysphagia and the patient underwent a percutaneous endoscopic gastrostomy tube placement on 11/15/2022.  She was continued on tube feeds.  She developed some hypoxia requiring 2 LPM oxygen via nasal cannula.  Patient was noted to have mild pulmonary edema more prominent on the right.  NT proBNP was mildly elevated at 675.  Echocardiogram showed ejection fraction of 60% with normal right ventricular size and systolic function.  Aortic valve sclerosis without stenosis.  Patient was started on gentle diuresis, which  was discontinued due to hypotension.  She was started on midodrine for a period of time for her hypotension.    Patient developed a mild leukocytosis with white count in the range of 12-14.  She did have a positive urinalysis and was treated with a 3-day course of ceftriaxone for acute cystitis.  Final urine culture showed pansensitive E. coli.  Procalcitonin remained within normal limits.    Postacute placement was difficult without acceptance due to no insurance or payer source.  Following multiple discussions with the family as well as the patient, patient was transitioned to comfort care measures as per the patient's request.  The patient had full decision making capacity.  Although she did not want to be discharged to a group home or her home, she understood that these were only choices as she became medically stable for discharge.  The family and the patient opted to be discharged to a group home with [facility].  She continued to have significant right upper extremity weakness as well as profound expressive aphasia.    Therefore, she is discharged in fair and stable condition to hospice at  [facility] with [facility] hosp[facility].    The patient met 2-midnight criteria for an inpatient stay at the time of discharge.    Discharge Date  12/9/2022    FOLLOW UP ITEMS POST DISCHARGE  -As per [facility] hospice.    DISCHARGE DIAGNOSES  Principal Problem:    Acute CVA (cerebrovascular accident) (HCC) POA: Yes  Active Problems:    Oropharyngeal dysphagia POA: Yes    Acute hypoxemic respiratory failure (HCC) POA: Yes    Leukocytosis POA: Yes    Moderate episode of recurrent major depressive disorder (HCC) POA: Yes    Acute pulmonary edema (HCC) POA: Yes    Cerebral edema (HCC) POA: Yes    Type 2 diabetes mellitus with hyperglycemia, without long-term current use of insulin ([facility]) POA: Yes    Failure to thrive in adult POA: Yes    Weakness of right upper extremity POA: Yes    Acute ischemic left ICA stroke ([facility])  POA: Yes    HLD (hyperlipidemia) POA: Yes    Vision loss POA: Yes    Carotid stenosis POA: Yes    Thyroid nodule POA: Clinically Undetermined    Comfort measures only status POA: No    DNR (do not resuscitate) POA: No  Resolved Problems:    Hypotensive episode POA: Yes    Acute cystitis without hematuria POA: Yes      FOLLOW UP  Future Appointments   Date Time Provider Department Center   12/9/2022 To Be Determined Margarette Andres R.N. [facility] None     No follow-up provider specified.    MEDICATIONS ON DISCHARGE     Medication List        Start taking these medications        Instructions   escitalopram 10 MG Tabs  Start taking on: December 10, 2022  Commonly known as: Lexapro   1 Tablet by Enteral Tube route every day.  Dose: 10 mg              Allergies  No Known Allergies    DIET  Nothing by mouth  Tube feedings via PEG    ACTIVITY  As tolerated.  Weight bearing as tolerated    CONSULTATIONS  Neurology  Palliative medicine  Physical medicine rehabilitation  Gastroenterology    PROCEDURES  Video electroencephalogram 11/30/2022  Endoscopic gastroduodenoscopy with PEG tube placement on 11/15/2022    LABORATORY  Lab Results   Component Value Date    SODIUM 135 11/27/2022    POTASSIUM 4.3 11/27/2022    CHLORIDE 100 11/27/2022    CO2 28 11/27/2022    GLUCOSE 135 (H) 11/27/2022    BUN 17 11/27/2022    CREATININE 0.30 (L) 11/27/2022        Lab Results   Component Value Date    WBC 13.1 (H) 11/27/2022    HEMOGLOBIN 11.9 (L) 11/27/2022    HEMATOCRIT 36.4 (L) 11/27/2022    PLATELETCT 413 11/27/2022        Total time of the discharge process exceeds 32 minutes.

## 2022-12-09 NOTE — DISCHARGE PLANNING
Medical Social Work  Volt from [facility], requested SW set up transport for 4:00 to group home.    SW faxed transport communication/[employer] to ride line for a tentative transport time of 4:00 to   [address] [facility] [id number]

## 2022-12-09 NOTE — DISCHARGE PLANNING
Received Choice form at [phone]  Agency/Facility Name: [facility]  Referral sent per Choice form @ [phone]

## 2022-12-11 NOTE — HOSPICE
Met with family at the . Patient continues to refuse food/liquids. Family celebrating her 57th birthday in the room. Karuna and Serenity very appreciative of  placement with hospice services. Reviewed POC with family and  staff.

## 2022-12-18 NOTE — HOSPICE
Transitioning. Upon arrival, met by caregiver, Traci and was given report. Patient's 3 siblings at bedside. Jacqueline seems to be very comfortable. She is being given morphine and lorazepam as ordered. Long discussion with Collette, Lori, and Gilberto on EOL expectations. Serenity is going back home today. Collette and Scott still wants to be present during RN visits. No other needs at this time.

## (undated) DEVICE — SET LEADWIRE 5 LEAD BEDSIDE DISPOSABLE ECG (1SET OF 5/EA)

## (undated) DEVICE — GLOVE BIOGEL SZ 7.5 SURGICAL PF LTX - (50PR/BX 4BX/CA)

## (undated) DEVICE — BITE BLOCK, DISP.

## (undated) DEVICE — CANNULA O2 COMFORT SOFT EAR ADULT 7 FT TUBING (50/CA)

## (undated) DEVICE — TOWEL STOP TIMEOUT SAFETY FLAG (40EA/CA)

## (undated) DEVICE — CATHETER IV SAFETY 22 GA X 1 (50EA/BX)

## (undated) DEVICE — CANISTER SUCTION RIGID RED 1500CC (40EA/CA)

## (undated) DEVICE — KIT CUSTOM PROCEDURE SINGLE FOR ENDO  (15/CA)

## (undated) DEVICE — SET EXTENSION WITH 2 PORTS (48EA/CA) ***PART #2C8610 IS A SUBSTITUTE*****

## (undated) DEVICE — ELECTRODE 850 FOAM ADHESIVE - HYDROGEL RADIOTRNSPRNT (50/PK)

## (undated) DEVICE — SENSOR OXIMETER ADULT SPO2 RD SET (20EA/BX)

## (undated) DEVICE — LACTATED RINGERS INJ 1000 ML - (14EA/CA 60CA/PF)

## (undated) DEVICE — TUBE CONNECTING SUCTION - CLEAR PLASTIC STERILE 72 IN (50EA/CA)

## (undated) DEVICE — MASK PANORAMIC OXYGEN PRO2 (30EA/CA)

## (undated) DEVICE — FILM CASSETTE ENDO